# Patient Record
Sex: MALE | Race: NATIVE HAWAIIAN OR OTHER PACIFIC ISLANDER | HISPANIC OR LATINO | Employment: OTHER | URBAN - METROPOLITAN AREA
[De-identification: names, ages, dates, MRNs, and addresses within clinical notes are randomized per-mention and may not be internally consistent; named-entity substitution may affect disease eponyms.]

---

## 2017-01-12 ENCOUNTER — ANTICOAGULATION THERAPY VISIT (OUTPATIENT)
Dept: NURSING | Facility: CLINIC | Age: 82
End: 2017-01-12
Payer: COMMERCIAL

## 2017-01-12 DIAGNOSIS — I48.20 CHRONIC ATRIAL FIBRILLATION (H): ICD-10-CM

## 2017-01-12 DIAGNOSIS — Z79.01 LONG-TERM (CURRENT) USE OF ANTICOAGULANTS: Primary | ICD-10-CM

## 2017-01-12 LAB — INR POINT OF CARE: 1.8 (ref 0.86–1.14)

## 2017-01-12 PROCEDURE — 99207 ZZC NO CHARGE NURSE ONLY: CPT

## 2017-01-12 PROCEDURE — 36416 COLLJ CAPILLARY BLOOD SPEC: CPT

## 2017-01-12 PROCEDURE — 85610 PROTHROMBIN TIME: CPT | Mod: QW

## 2017-01-12 NOTE — MR AVS SNAPSHOT
Pranay Dubon   1/12/2017 8:00 AM   Anticoagulation Therapy Visit    Description:  81 year old male   Provider:  VANDA ANTI COAG   Department:  [facility]           INR as of 1/12/2017     Selected INR 1.8! (1/12/2017)      Anticoagulation Summary as of 1/12/2017     INR goal 2.0-3.0   Selected INR 1.8! (1/12/2017)   Full instructions 5 mg on Thu; 2.5 mg all other days   Next INR check 1/26/2017    Indications   Long-term (current) use of anticoagulants [Z79.01] [Z79.01]  Chronic atrial fibrillation (H) [I48.2]         Your next Anticoagulation Clinic appointment(s)     Jan 26, 2017  8:00 AM   Anticoagulation Visit with VANDA ANTI MOHSEN   [facility] ([facility]    [address] [facility] [address] [phone]              Contact Numbers     [facility]  Please call [phone] to cancel and/or reschedule your appointment   Please call [phone] with any problems or questions regarding your therapy.        January 2017 Details    Sun Mon Tue Wed Thu Fri Sat     1               2               3               4               5               6               7                 8               9               10               11               12      5 mg   See details      13      2.5 mg         14      2.5 mg           15      2.5 mg         16      2.5 mg         17      2.5 mg         18      2.5 mg         19      5 mg         20      2.5 mg         21      2.5 mg           22      2.5 mg         23      2.5 mg         24      2.5 mg         25      2.5 mg         26            27               28                 29               30               31                    Date Details   01/12 This INR check       Date of next INR:  1/26/2017         How to take your warfarin dose     To take:  2.5 mg Take 0.5 of a 5 mg tablet.    To take:  5 mg Take 1 of the 5 mg tablets.

## 2017-01-12 NOTE — PROGRESS NOTES
ANTICOAGULATION FOLLOW-UP CLINIC VISIT    Patient Name:  Pranay Dubon  Date:  1/12/2017  Contact Type:  Face to Face    SUBJECTIVE:     Patient Findings     Positives No Problem Findings, Unexplained INR or factor level change           OBJECTIVE    INR PROTIME   Date Value Ref Range Status   01/12/2017 1.8* 0.86 - 1.14 Final       ASSESSMENT / PLAN  INR assessment SUB    Recheck INR In: 2 WEEKS    INR Location Clinic      Anticoagulation Summary as of 1/12/2017     INR goal 2.0-3.0   Selected INR 1.8! (1/12/2017)   Maintenance plan 5 mg (5 mg x 1) on Thu; 2.5 mg (5 mg x 0.5) all other days   Full instructions 5 mg on Thu; 2.5 mg all other days   Weekly total 20 mg   Plan last modified Stephanie Lucio, RN (1/12/2017)   Next INR check 1/26/2017   Priority INR   Target end date Indefinite    Indications   Long-term (current) use of anticoagulants [Z79.01] [Z79.01]  Chronic atrial fibrillation (H) [I48.2]         Anticoagulation Episode Summary     INR check location     Preferred lab     Send INR reminders to [facility]    Comments       Anticoagulation Care Providers     Provider Role Specialty Phone number    Rose Spencer MD [facility] [phone]            See the Encounter Report to view Anticoagulation Flowsheet and Dosing Calendar (Go to Encounters tab in chart review, and find the Anticoagulation Therapy Visit)    RN reviewed possible causes for out of range INR today with patient.  No changes noted by patient .   Will continue to monitor closely until INR within therapeutic range for patient. Patient educated on signs and symptoms of Bleeding,Stroke or Clots. Patient stated understanding and will seek immediate care if developing signs and symptoms of concern. Dosage adjustment made based on physician directed care plan.          Stephanie Lucio, RN

## 2017-01-20 ENCOUNTER — OFFICE VISIT (OUTPATIENT)
Dept: OPHTHALMOLOGY | Facility: CLINIC | Age: 82
End: 2017-01-20
Payer: COMMERCIAL

## 2017-01-20 DIAGNOSIS — Z01.01 ENCOUNTER FOR EXAMINATION OF EYES AND VISION WITH ABNORMAL FINDINGS: Primary | ICD-10-CM

## 2017-01-20 DIAGNOSIS — H43.813 POSTERIOR VITREOUS DETACHMENT, BILATERAL: ICD-10-CM

## 2017-01-20 DIAGNOSIS — H26.9 CATARACT: ICD-10-CM

## 2017-01-20 DIAGNOSIS — H52.4 PRESBYOPIA: ICD-10-CM

## 2017-01-20 PROBLEM — H25.813 COMBINED FORM OF AGE-RELATED CATARACT, BOTH EYES: Status: ACTIVE | Noted: 2017-01-20

## 2017-01-20 PROCEDURE — 92004 COMPRE OPH EXAM NEW PT 1/>: CPT | Performed by: OPHTHALMOLOGY

## 2017-01-20 PROCEDURE — 92015 DETERMINE REFRACTIVE STATE: CPT | Performed by: OPHTHALMOLOGY

## 2017-01-20 ASSESSMENT — REFRACTION_WEARINGRX
OS_AXIS: 179
OD_SPHERE: -2.50
OS_ADD: +3.00
OD_AXIS: 015
OD_CYLINDER: +2.00
OS_CYLINDER: +0.50
OD_ADD: +3.00
OS_SPHERE: +0.75

## 2017-01-20 ASSESSMENT — VISUAL ACUITY
OS_CC: 20/30
OD_CC: 20/25-
OS_CC: J1+
OD_CC: J1
CORRECTION_TYPE: GLASSES
METHOD: SNELLEN - LINEAR

## 2017-01-20 ASSESSMENT — REFRACTION_MANIFEST
OS_ADD: +3.00
OD_AXIS: 005
OD_ADD: +3.00
OD_SPHERE: -2.50
OS_SPHERE: +0.25
OD_CYLINDER: +1.50
OS_AXIS: 180
OS_CYLINDER: +1.75

## 2017-01-20 ASSESSMENT — EXTERNAL EXAM - LEFT EYE: OS_EXAM: MILD TO MOD BROW, PROLAPSED FAT PADS: UPPER, LOWER

## 2017-01-20 ASSESSMENT — CONF VISUAL FIELD
OD_NORMAL: 1
OS_NORMAL: 1

## 2017-01-20 ASSESSMENT — TONOMETRY
IOP_METHOD: APPLANATION
OS_IOP_MMHG: 11
OD_IOP_MMHG: 10

## 2017-01-20 ASSESSMENT — SLIT LAMP EXAM - LIDS
COMMENTS: NORMAL
COMMENTS: NORMAL

## 2017-01-20 ASSESSMENT — EXTERNAL EXAM - RIGHT EYE: OD_EXAM: MILD TO MOD BROW, PROLAPSED FAT PADS: UPPER, LOWER

## 2017-01-20 ASSESSMENT — CUP TO DISC RATIO
OD_RATIO: 0.6
OS_RATIO: 0.7

## 2017-01-20 NOTE — MR AVS SNAPSHOT
After Visit Summary   1/20/2017    Pranay uDbon    MRN: [id number]           Patient Information     Date Of Birth          1935        Visit Information        Provider Department      1/20/2017 8:00 AM Kaushik Salazar MD [facility]        Today's Diagnoses     Examination of eyes and vision    -  1     Presbyopia         Myopia, right         Hypermetropia, left         Astigmatism, bilateral         Combined form of age-related cataract, both eyes         Posterior vitreous detachment, bilateral           Care Instructions    Glasses Rx given -optional   Call in September 2017 for an appointment in January 2018 for Complete Exam    Dr. Salazar [phone]        Follow-ups after your visit        Your next 10 appointments already scheduled     Jan 25, 2017  8:30 AM   PHYSICAL with Rose Spencer MD   [facility] ([facility]    [address] [id number] [phone]            Jan 26, 2017  8:00 AM   Anticoagulation Visit with FZ ANTI COAG   [facility] ([facility]    [address] [place] [address] [phone]              Who to contact     If you have questions or need follow up information about today's clinic visit or your schedule please contact [facility] directly at [phone].  Normal or non-critical lab and imaging results will be communicated to you by MyChart, letter or phone within 4 business days after the clinic has received the results. If you do not hear from us within 7 days, please contact the clinic through MyChart or phone. If you have a critical or abnormal lab result, we will notify you by phone as soon as possible.  Submit refill requests through [email or web address] or call your pharmacy and they will forward the refill request to us. Please allow 3 business days for your refill to be completed.          Additional Information About Your Visit         Cloudjutsu Information     Cloud[employer] gives you secure access to your electronic health record. If you see a primary care provider, you can also send messages to your care team and make appointments. If you have questions, please call your primary care clinic.  If you do not have a primary care provider, please call [phone] and they will assist you.        Care EveryWhere ID     This is your Care EveryWhere ID. This could be used by other organizations to access your [place] medical records  [id number]         Blood Pressure from Last 3 Encounters:   08/11/16 128/68   05/23/16 134/65   02/08/16 118/72    Weight from Last 3 Encounters:   08/11/16 82.101 kg (181 lb)   06/10/16 82.555 kg (182 lb)   06/01/16 82.555 kg (182 lb)              We Performed the Following     EYE EXAM (SIMPLE-NONBILLABLE)     REFRACTIVE STATUS        Primary Care Provider Office Phone # Fax #    Rose Spencer [phone]       [address] [address]        Thank you!     Thank you for choosing [facility]  for your care. Our goal is always to provide you with excellent care. Hearing back from our patients is one way we can continue to improve our services. Please take a few minutes to complete the written survey that you may receive in the mail after your visit with us. Thank you!             Your Updated Medication List - Protect others around you: Learn how to safely use, store and throw away your medicines at www.disposemymeds.org.          This list is accurate as of: 1/20/17  8:58 AM.  Always use your most recent med list.                   Brand Name Dispense Instructions for use    aspirin 81 MG tablet      Take 81 mg by mouth daily       doxazosin 4 MG tablet    CARDURA     Take 4 mg by mouth At Bedtime       METAMUCIL PO          metoprolol 50 MG tablet    LOPRESSOR    180 tablet    Take 1/2 tablet twice a day       * NITROSTAT SL      Place 0.4  mg under the tongue       * nitroglycerin 0.4 MG sublingual tablet    NITROSTAT    25 tablet    Place 1 tablet (0.4 mg) under the tongue every 5 minutes as needed for chest pain If you are still having symptoms after 3 doses (15 minutes) call 911.       simvastatin 80 MG tablet    ZOCOR    90 tablet    Take 1 TABLET EVERY EVENING       VIAGRA 25 MG cap/tab   Generic drug:  sildenafil      Take 25 mg by mouth as needed. As needed       warfarin 5 MG tablet    COUMADIN     Take 0.5 tablets (2.5 mg) by mouth daily       * Notice:  This list has 2 medication(s) that are the same as other medications prescribed for you. Read the directions carefully, and ask your doctor or other care provider to review them with you.

## 2017-01-20 NOTE — PROGRESS NOTES
Current Eye Medications:  None      Subjective:  COMPLETE EYE EXAM  [facility] and he feels that the vision is not the best especially for driving.     Objective:  See Ophthalmology Exam.       Assessment: Baseline eye exam in patient with cataracts approaching visual significance.      ICD-10-CM    1. Encounter for examination of eyes and vision with abnormal findings Z01.01    2. Presbyopia H52.4 EYE EXAM (SIMPLE-NONBILLABLE)     REFRACTIVE STATUS   3. Cataract, mild-mod, ou H26.9    4. Posterior vitreous detachment, bilateral H43.813         Plan: Glasses Rx given -optional   Discussed cataract surgery right eye if glasses change no better - M ring?  Call in September 2017 for an appointment in January 2018 for Complete Exam    Dr. Salazar [phone]

## 2017-01-20 NOTE — PATIENT INSTRUCTIONS
Glasses Rx given -optional   Discussed cataract surgery right eye if glasses change no better - M ring?  Call in September 2017 for an appointment in January 2018 for Complete Exam    Dr. Salazar [phone]

## 2017-01-21 PROBLEM — H25.813 COMBINED FORM OF AGE-RELATED CATARACT, BOTH EYES: Status: RESOLVED | Noted: 2017-01-20 | Resolved: 2017-01-21

## 2017-01-21 PROBLEM — H26.9 CATARACT: Status: ACTIVE | Noted: 2017-01-21

## 2017-01-24 NOTE — PROGRESS NOTES
SUBJECTIVE:                                                            Pranay Dubon is a 81 year old male who presents for Preventive Visit.      Are you in the first 12 months of your Medicare Part B coverage?  No    Healthy Habits:    Do you get at least three servings of calcium containing foods daily (dairy, green leafy vegetables, etc.)? yes    Amount of exercise or daily activities, outside of work: 5 day(s) per week    Problems taking medications regularly No    Medication side effects: No    Have you had an eye exam in the past two years? yes    Do you see a dentist twice per year? yes    Do you have sleep apnea, excessive snoring or daytime drowsiness?no    COGNITIVE SCREEN  1) Repeat 3 items (Banana, Sunrise, Chair)    2) Clock draw: NORMAL  3) 3 item recall: Recalls 3 objects  Results: 3 items recalled: COGNITIVE IMPAIRMENT LESS LIKELY    Mini-CogTM Copyright S [employer]. Licensed by the author for use in [facility]; reprinted with permission (adia@[email or web address]). All rights reserved.                 All Histories reviewed and updated in EPIC as appropriate.  Social History   Substance Use Topics     Smoking status: Former Smoker -- 1.00 packs/day for 30 years     Types: Cigarettes     Quit date: 08/13/1983     Smokeless tobacco: Never Used     Alcohol Use: No       The patient does not drink >3 drinks per day nor >7 drinks per week.    Today's PHQ-2 Score:   PHQ-2 ( 1999 Pfizer) 1/25/2017 8/11/2016   Q1: Little interest or pleasure in doing things 0 0   Q2: Feeling down, depressed or hopeless 0 0   PHQ-2 Score 0 0       Do you feel safe in your environment - Yes    Do you have a Health Care Directive?: Yes: Advance Directive has been received and scanned.    Current providers sharing in care for this patient include:   Patient Care Team:  Rose Spencer MD as PCP - General (Family Practice)      Hearing impairment: No    Ability to successfully perform activities of daily living: Yes, no  assistance needed     Fall risk:  Fallen 2 or more times in the past year?: No  Any fall with injury in the past year?: No    Home safety:  none identified      The following health maintenance items are reviewed in Epic and correct as of today:  Health Maintenance   Topic Date Due     PNEUMOCOCCAL (2 of 2 - PCV13) 11/25/2003     LIPID MONITORING Q3 MO (NO INBASKET)  11/11/2016     FALL RISK ASSESSMENT  02/08/2017     INFLUENZA VACCINE (SYSTEM ASSIGNED)  09/01/2017     ADVANCE DIRECTIVE PLANNING Q5 YRS (NO INBASKET)  11/28/2017     EYE EXAM Q1 YEAR( NO INBASKET)  01/20/2018     TETANUS IMMUNIZATION (SYSTEM ASSIGNED)  04/16/2023     Immunization History   Administered Date(s) Administered     Hepatitis A Vac Ped/Adol-2 Dose 05/20/2005     Influenza (High Dose) 3 valent vaccine 09/22/2016     Influenza (IIV3) 09/20/2012     Pneumococcal (PCV 13) 10/20/2014     Pneumococcal 23 valent 11/25/2002     TD (ADULT, 7+) 10/09/2003     Tdap (Adacel,Boostrix) 04/16/2013     Zoster vaccine, live 09/08/2008, 01/05/2009             ROS:  This 81 year old male is here today for annual male exam. He and his wife live in a house. 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 Thus, he landed a job in [place] and [facility]. His daughter just finished 7 years of work for the [employer] helping homeless veterans. She is back in [place] now. All other review of systems are negative  Personal, family, and social history reviewed with patient and revised.    C: NEGATIVE for fever, chills, change in weight  I: NEGATIVE for worrisome rashes, moles or lesions  E: NEGATIVE for vision changes or irritation  E/M: NEGATIVE for ear, mouth and throat problems  R: NEGATIVE for significant cough or SOB  B: NEGATIVE for masses, tenderness or discharge  CV: NEGATIVE for chest pain, palpitations  or peripheral edema  GI: NEGATIVE for nausea, abdominal pain, heartburn, or change in bowel habits  : NEGATIVE for frequency, dysuria, or hematuria  M: NEGATIVE for significant arthralgias or myalgia  N: NEGATIVE for weakness, dizziness or paresthesias  E: NEGATIVE for temperature intolerance, skin/hair changes  H: NEGATIVE for bleeding problems  P: NEGATIVE for changes in mood or affect    Problem list, Medication list, Allergies, and Medical/Social/Surgical histories reviewed in [facility] and updated as appropriate.  Labs reviewed in EPIC  BP Readings from Last 3 Encounters:   01/25/17 112/64   08/11/16 128/68   05/23/16 134/65    Wt Readings from Last 3 Encounters:   01/25/17 180 lb (81.647 kg)   08/11/16 181 lb (82.101 kg)   06/10/16 182 lb (82.555 kg)                  Patient Active Problem List   Diagnosis     Advanced directives, counseling/discussion     CAD (coronary artery disease)     Hyperlipidemia with target LDL less than 100     ED (erectile dysfunction)     Obesity     SNHL (sensorineural hearing loss)     Long-term (current) use of anticoagulants [Z79.01]     Chronic atrial fibrillation (H)     Benign essential hypertension     Coronary artery disease involving native heart without angina pectoris, unspecified vessel or lesion type     Posterior vitreous detachment, bilateral     Cataract, mild-mod, ou     Past Surgical History   Procedure Laterality Date     Angioplasty  1992     x 4       Social History   Substance Use Topics     Smoking status: Former Smoker -- 1.00 packs/day for 30 years     Types: Cigarettes     Quit date: 08/13/1983     Smokeless tobacco: Never Used     Alcohol Use: No     Family History   Problem Relation Age of Onset     DIABETES Mother      HEART DISEASE Mother      CHF     CEREBROVASCULAR DISEASE Mother      DIABETES Sister      CEREBROVASCULAR DISEASE Sister      HEART DISEASE Brother      CHF     CEREBROVASCULAR DISEASE Brother      HEART DISEASE Sister      CHF     CANCER  "No family hx of      Hypertension No family hx of      Thyroid Disease No family hx of      Glaucoma No family hx of      Macular Degeneration No family hx of          Current Outpatient Prescriptions   Medication Sig Dispense Refill     Atorvastatin Calcium (LIPITOR PO) Take 40 mg by mouth daily       warfarin (COUMADIN) 5 MG tablet Take 0.5 tablets (2.5 mg) by mouth daily       nitroglycerin (NITROSTAT) 0.4 MG SL tablet Place 1 tablet (0.4 mg) under the tongue every 5 minutes as needed for chest pain If you are still having symptoms after 3 doses (15 minutes) call 911. 25 tablet 0     metoprolol (LOPRESSOR) 50 MG tablet Take 1/2 tablet twice a day 180 tablet 3     aspirin 81 MG tablet Take 81 mg by mouth daily       Nitroglycerin (NITROSTAT SL) Place 0.4 mg under the tongue       Psyllium (METAMUCIL PO)        doxazosin (CARDURA) 4 MG tablet Take 4 mg by mouth At Bedtime       sildenafil (VIAGRA) 25 MG tablet Take 25 mg by mouth as needed. As needed       OBJECTIVE:                                                            /64 mmHg  Pulse 68  Temp(Src) 97.9  F (36.6  C)  Ht 5' 4\" (1.626 m)  Wt 180 lb (81.647 kg)  BMI 30.88 kg/m2  SpO2 100% Estimated body mass index is 30.88 kg/(m^2) as calculated from the following:    Height as of this encounter: 5' 4\" (1.626 m).    Weight as of this encounter: 180 lb (81.647 kg).  EXAM:   GENERAL: healthy, alert and no distress  EYES: Eyes grossly normal to inspection, PERRL and conjunctivae and sclerae normal  HENT: ear canals and TM's normal, nose and mouth without ulcers or lesions  NECK: no adenopathy, no asymmetry, masses, or scars and thyroid normal to palpation  RESP: lungs clear to auscultation - no rales, rhonchi or wheezes  CV: regular rate and rhythm, normal S1 S2, no S3 or S4, no murmur, click or rub, no peripheral edema and peripheral pulses strong  ABDOMEN: soft, nontender, no hepatosplenomegaly, no masses and bowel sounds normal  MS: no gross " "musculoskeletal defects noted, no edema  SKIN: no suspicious lesions or rashes  NEURO: Normal strength and tone, mentation intact and speech normal  PSYCH: mentation appears normal, affect normal/bright    ASSESSMENT / PLAN:                                                            1. Encounter for routine adult health examination without abnormal findings  Healthy man     2. Need for prophylactic vaccination against Streptococcus pneumoniae (pneumococcus)  due    3. Hyperlipidemia with target LDL less than 100  due  - Lipid panel reflex to direct LDL    4. Benign essential hypertension  Good control   - Basic metabolic panel    End of Life Planning:  Patient currently has an advanced directive: No.  I have verified the patient's ablity to prepare an advanced directive/make health care decisions.  Literature was provided to assist patient in preparing an advanced directive.    COUNSELING:  Reviewed preventive health counseling, as reflected in patient instructions       Regular exercise       Healthy diet/nutrition        Estimated body mass index is 30.88 kg/(m^2) as calculated from the following:    Height as of this encounter: 5' 4\" (1.626 m).    Weight as of this encounter: 180 lb (81.647 kg).     reports that he quit smoking about 33 years ago. His smoking use included Cigarettes. He has a 30 pack-year smoking history. He has never used smokeless tobacco.      Appropriate preventive services were discussed with this patient, including applicable screening as appropriate for cardiovascular disease, diabetes, osteopenia/osteoporosis, and glaucoma.  As appropriate for age/gender, discussed screening for colorectal cancer, prostate cancer, breast cancer, and cervical cancer. Checklist reviewing preventive services available has been given to the patient.    Reviewed patients plan of care and provided an AVS. The Basic Care Plan (routine screening as documented in Health Maintenance) for Pranay meets the Care " Plan requirement. This Care Plan has been established and reviewed with the Patient.    Counseling Resources:  ATP IV Guidelines  Pooled Cohorts Equation Calculator  Breast Cancer Risk Calculator  FRAX Risk Assessment  ICSI Preventive Guidelines  Dietary Guidelines for Americans, 2010  USDA's MyPlate  ASA Prophylaxis  Lung CA Screening    MICHELLE YOUNGBLOOD MD  [facility]

## 2017-01-24 NOTE — PATIENT INSTRUCTIONS
Preventive Health Recommendations:   Male Ages 65 and over    Yearly exam:             See your health care provider every year in order to  o   Review health changes.   o   Discuss preventive care.    o   Review your medicines if your doctor has prescribed any.    Talk with your health care provider about whether you should have a test to screen for prostate cancer (PSA).    Every 3 years, have a diabetes test (fasting glucose). If you are at risk for diabetes, you should have this test more often.    Every 5 years, have a cholesterol test. Have this test more often if you are at risk for high cholesterol or heart disease.     Every 10 years, have a colonoscopy. Or, have a yearly FIT test (stool test). These exams will check for colon cancer.    Talk to with your health care provider about screening for Abdominal Aortic Aneurysm if you have a family history of AAA or have a history of smoking.    Shots:     Get a flu shot each year.     Get a tetanus shot every 10 years.     Talk to your doctor about your pneumonia vaccines. There are now two you should receive - Pneumovax (PPSV 23) and Prevnar (PCV 13).     Talk to your doctor about a shingles vaccine.     Talk to your doctor about the hepatitis B vaccine.  Nutrition:     Eat at least 5 servings of fruits and vegetables each day.     Eat whole-grain bread, whole-wheat pasta and brown rice instead of white grains and rice.     Talk to your provider about Calcium and Vitamin D.   Lifestyle    Exercise for at least 150 minutes a week (30 minutes a day, 5 days a week). This will help you control your weight and prevent disease.     Limit alcohol to one drink per day.     No smoking.     Wear sunscreen to prevent skin cancer.     See your dentist every six months for an exam and cleaning.     See your eye doctor every 1 to 2 years to screen for conditions such as glaucoma, macular degeneration, cataracts, etc   [facility]    If you have any questions  regarding to your visit please contact your care team:       Team Purple:   Clinic Hours Telephone Number   [address] Dr.   7am-7pm  Monday - Thursday   7am-5pm  Fridays  [phone]  (Appointment scheduling available 24/7)    Questions about your Visit?   Team Line:  [phone]   Urgent Care - [place] [facility] - 11am-9pm Monday-Friday Saturday-Sunday- 9am-5pm   [place] - 5pm-9pm Monday-Friday Saturday-Sunday- 9am-5pm  [phone] - [place]   [phone] - [place]       What options do I have for visits at the clinic other than the traditional office visit?  To expand how we care for you, many of our providers are utilizing electronic visits (e-visits) and telephone visits, when medically appropriate, for interactions with their patients rather than a visit in the clinic.   We also offer nurse visits for many medical concerns. Just like any other service, we will bill your insurance company for this type of visit based on time spent on the phone with your provider. Not all insurance companies cover these visits. Please check with your medical insurance if this type of visit is covered. You will be responsible for any charges that are not paid by your insurance.      E-visits via [employer]:  generally incur a $35.00 fee.  Telephone visits:  Time spent on the phone: *charged based on time that is spent on the phone in increments of 10 minutes. Estimated cost:   5-10 mins $30.00   11-20 mins. $59.00   21-30 mins. $85.00     Use [employer] (secure email communication and access to your chart) to send your primary care provider a message or make an appointment. Ask someone on your Team how to sign up for [employer].  For a Price Quote for your services, please call our Consumer Price Line at [phone].  As always, Thank you for trusting us with your health care needs!

## 2017-01-25 ENCOUNTER — OFFICE VISIT (OUTPATIENT)
Dept: FAMILY MEDICINE | Facility: CLINIC | Age: 82
End: 2017-01-25
Payer: COMMERCIAL

## 2017-01-25 VITALS
DIASTOLIC BLOOD PRESSURE: 64 MMHG | HEIGHT: 64 IN | HEART RATE: 68 BPM | WEIGHT: 180 LBS | TEMPERATURE: 97.9 F | BODY MASS INDEX: 30.73 KG/M2 | OXYGEN SATURATION: 100 % | SYSTOLIC BLOOD PRESSURE: 112 MMHG

## 2017-01-25 DIAGNOSIS — I10 BENIGN ESSENTIAL HYPERTENSION: ICD-10-CM

## 2017-01-25 DIAGNOSIS — Z00.00 ENCOUNTER FOR ROUTINE ADULT HEALTH EXAMINATION WITHOUT ABNORMAL FINDINGS: Primary | ICD-10-CM

## 2017-01-25 DIAGNOSIS — Z23 NEED FOR PROPHYLACTIC VACCINATION AGAINST STREPTOCOCCUS PNEUMONIAE (PNEUMOCOCCUS): ICD-10-CM

## 2017-01-25 DIAGNOSIS — E78.5 HYPERLIPIDEMIA WITH TARGET LDL LESS THAN 100: ICD-10-CM

## 2017-01-25 LAB
ANION GAP SERPL CALCULATED.3IONS-SCNC: 7 MMOL/L (ref 3–14)
BUN SERPL-MCNC: 19 MG/DL (ref 7–30)
CALCIUM SERPL-MCNC: 8.9 MG/DL (ref 8.5–10.1)
CHLORIDE SERPL-SCNC: 106 MMOL/L (ref 94–109)
CHOLEST SERPL-MCNC: 145 MG/DL
CO2 SERPL-SCNC: 26 MMOL/L (ref 20–32)
CREAT SERPL-MCNC: 1.15 MG/DL (ref 0.66–1.25)
GFR SERPL CREATININE-BSD FRML MDRD: 61 ML/MIN/1.7M2
GLUCOSE SERPL-MCNC: 111 MG/DL (ref 70–99)
HDLC SERPL-MCNC: 61 MG/DL
LDLC SERPL CALC-MCNC: 72 MG/DL
NONHDLC SERPL-MCNC: 84 MG/DL
POTASSIUM SERPL-SCNC: 4.2 MMOL/L (ref 3.4–5.3)
SODIUM SERPL-SCNC: 139 MMOL/L (ref 133–144)
TRIGL SERPL-MCNC: 59 MG/DL

## 2017-01-25 PROCEDURE — 99397 PER PM REEVAL EST PAT 65+ YR: CPT | Performed by: FAMILY MEDICINE

## 2017-01-25 PROCEDURE — 80048 BASIC METABOLIC PNL TOTAL CA: CPT | Performed by: FAMILY MEDICINE

## 2017-01-25 PROCEDURE — 36415 COLL VENOUS BLD VENIPUNCTURE: CPT | Performed by: FAMILY MEDICINE

## 2017-01-25 PROCEDURE — 80061 LIPID PANEL: CPT | Performed by: FAMILY MEDICINE

## 2017-01-25 NOTE — MR AVS SNAPSHOT
After Visit Summary   1/25/2017    Pranay Dubon    MRN: [id number]           Patient Information     Date Of Birth          1935        Visit Information        Provider Department      1/25/2017 8:30 AM Rose Spencer MD [facility]        Today's Diagnoses     Encounter for routine adult health examination without abnormal findings    -  1     Need for prophylactic vaccination against Streptococcus pneumoniae (pneumococcus)         Hyperlipidemia with target LDL less than 100         Benign essential hypertension           Care Instructions      Preventive Health Recommendations:   Male Ages 65 and over    Yearly exam:             See your health care provider every year in order to  o   Review health changes.   o   Discuss preventive care.    o   Review your medicines if your doctor has prescribed any.    Talk with your health care provider about whether you should have a test to screen for prostate cancer (PSA).    Every 3 years, have a diabetes test (fasting glucose). If you are at risk for diabetes, you should have this test more often.    Every 5 years, have a cholesterol test. Have this test more often if you are at risk for high cholesterol or heart disease.     Every 10 years, have a colonoscopy. Or, have a yearly FIT test (stool test). These exams will check for colon cancer.    Talk to with your health care provider about screening for Abdominal Aortic Aneurysm if you have a family history of AAA or have a history of smoking.    Shots:     Get a flu shot each year.     Get a tetanus shot every 10 years.     Talk to your doctor about your pneumonia vaccines. There are now two you should receive - Pneumovax (PPSV 23) and Prevnar (PCV 13).     Talk to your doctor about a shingles vaccine.     Talk to your doctor about the hepatitis B vaccine.  Nutrition:     Eat at least 5 servings of fruits and vegetables each day.     Eat whole-grain bread, whole-wheat pasta and  brown rice instead of white grains and rice.     Talk to your provider about Calcium and Vitamin D.   Lifestyle    Exercise for at least 150 minutes a week (30 minutes a day, 5 days a week). This will help you control your weight and prevent disease.     Limit alcohol to one drink per day.     No smoking.     Wear sunscreen to prevent skin cancer.     See your dentist every six months for an exam and cleaning.     See your eye doctor every 1 to 2 years to screen for conditions such as glaucoma, macular degeneration, cataracts, etc   [facility]    If you have any questions regarding to your visit please contact your care team:       Team Purple:   Clinic Hours Telephone Number   [address] Dr.   7am-7pm  Monday - Thursday   7am-5pm  Fridays  [phone]  (Appointment scheduling available 24/7)    Questions about your Visit?   Team Line:  [phone]   Urgent Care - [facility] and [facility] - 11am-9pm Monday-Friday Saturday-Sunday- 9am-5pm   [facility] - 5pm-9pm Monday-Friday Saturday-Sunday- 9am-5pm  [phone] - [place]   [phone] - [facility]       What options do I have for visits at the clinic other than the traditional office visit?  To expand how we care for you, many of our providers are utilizing electronic visits (e-visits) and telephone visits, when medically appropriate, for interactions with their patients rather than a visit in the clinic.   We also offer nurse visits for many medical concerns. Just like any other service, we will bill your insurance company for this type of visit based on time spent on the phone with your provider. Not all insurance companies cover these visits. Please check with your medical insurance if this type of visit is covered. You will be responsible for any charges that are not paid by your insurance.      E-visits via [employer]:  generally incur a $35.00 fee.  Telephone  visits:  Time spent on the phone: *charged based on time that is spent on the phone in increments of 10 minutes. Estimated cost:   5-10 mins $30.00   11-20 mins. $59.00   21-30 mins. $85.00     Use [employer] (secure email communication and access to your chart) to send your primary care provider a message or make an appointment. Ask someone on your Team how to sign up for [employer].  For a Price Quote for your services, please call our [facility] Price Line at [phone].  As always, Thank you for trusting us with your health care needs!            Follow-ups after your visit        Your next 10 appointments already scheduled     Jan 26, 2017  8:00 AM   Anticoagulation Visit with FZ ANTI COAG   [facility] [place] ([facility])    [address]  [place] [address] [phone]              Who to contact     If you have questions or need follow up information about today's clinic visit or your schedule please contact [facility] directly at [phone].  Normal or non-critical lab and imaging results will be communicated to you by Kindfulhart, letter or phone within 4 business days after the clinic has received the results. If you do not hear from us within 7 days, please contact the clinic through Kind[employer] or phone. If you have a critical or abnormal lab result, we will notify you by phone as soon as possible.  Submit refill requests through [employer] or call your pharmacy and they will forward the refill request to us. Please allow 3 business days for your refill to be completed.          Additional Information About Your Visit        [employer]iggle Information     [employer] gives you secure access to your electronic health record. If you see a primary care provider, you can also send messages to your care team and make appointments. If you have questions, please call your primary care clinic.  If you do not have a primary care provider, please call [phone] and they will assist you.    "     Care EveryWhere ID     This is your Care EveryWhere ID. This could be used by other organizations to access your [facility] medical records  [id number]        Your Vitals Were     Pulse Temperature Height BMI (Body Mass Index) Pulse Oximetry       68 97.9  F (36.6  C) 5' 4\" (1.626 m) 30.88 kg/m2 100%        Blood Pressure from Last 3 Encounters:   01/25/17 112/64   08/11/16 128/68   05/23/16 134/65    Weight from Last 3 Encounters:   01/25/17 180 lb (81.647 kg)   08/11/16 181 lb (82.101 kg)   06/10/16 182 lb (82.555 kg)              We Performed the Following     Basic metabolic panel     Lipid panel reflex to direct LDL        Primary Care Provider Office Phone # Fax #    Rose Spencer [phone]       [address] [address]        Thank you!     Thank you for choosing [facility]  for your care. Our goal is always to provide you with excellent care. Hearing back from our patients is one way we can continue to improve our services. Please take a few minutes to complete the written survey that you may receive in the mail after your visit with us. Thank you!             Your Updated Medication List - Protect others around you: Learn how to safely use, store and throw away your medicines at www.disposemymeds.org.          This list is accurate as of: 1/25/17  8:58 AM.  Always use your most recent med list.                   Brand Name Dispense Instructions for use    aspirin 81 MG tablet      Take 81 mg by mouth daily       doxazosin 4 MG tablet    CARDURA     Take 4 mg by mouth At Bedtime       LIPITOR PO      Take 40 mg by mouth daily       METAMUCIL PO          metoprolol 50 MG tablet    LOPRESSOR    180 tablet    Take 1/2 tablet twice a day       * NITROSTAT SL      Place 0.4 mg under the tongue       * nitroglycerin 0.4 MG sublingual tablet    NITROSTAT    25 tablet    Place 1 tablet (0.4 mg) under the tongue every 5 " minutes as needed for chest pain If you are still having symptoms after 3 doses (15 minutes) call 911.       VIAGRA 25 MG cap/tab   Generic drug:  sildenafil      Take 25 mg by mouth as needed. As needed       warfarin 5 MG tablet    COUMADIN     Take 0.5 tablets (2.5 mg) by mouth daily       * Notice:  This list has 2 medication(s) that are the same as other medications prescribed for you. Read the directions carefully, and ask your doctor or other care provider to review them with you.

## 2017-01-25 NOTE — Clinical Note
[facility]  [address]  [place] [address]    January 25, 2017    Pranay Dubon  [address] [facility] [address]          Dear Pranay,    Enclosed is a copy of your results. Your labs are all within normal limits    Results for orders placed or performed in visit on 01/25/17   Lipid panel reflex to direct LDL   Result Value Ref Range    Cholesterol 145 <200 mg/dL    Triglycerides 59 <150 mg/dL    HDL Cholesterol 61 >39 mg/dL    LDL Cholesterol Calculated 72 <100 mg/dL    Non HDL Cholesterol 84 <130 mg/dL   Basic metabolic panel   Result Value Ref Range    Sodium 139 133 - 144 mmol/L    Potassium 4.2 3.4 - 5.3 mmol/L    Chloride 106 94 - 109 mmol/L    Carbon Dioxide 26 20 - 32 mmol/L    Anion Gap 7 3 - 14 mmol/L    Glucose 111 (H) 70 - 99 mg/dL    Urea Nitrogen 19 7 - 30 mg/dL    Creatinine 1.15 0.66 - 1.25 mg/dL    GFR Estimate 61 >60 mL/min/1.7m2    GFR Estimate If Black 74 >60 mL/min/1.7m2    Calcium 8.9 8.5 - 10.1 mg/dL       If you have any questions or concerns, please call myself or my nurse at [phone].      Sincerely,        Rose Spencer MD, dr

## 2017-01-25 NOTE — NURSING NOTE
"Chief Complaint   Patient presents with     Physical       Initial /64 mmHg  Pulse 68  Temp(Src) 97.9  F (36.6  C)  Ht 5' 4\" (1.626 m)  Wt 180 lb (81.647 kg)  BMI 30.88 kg/m2  SpO2 100% Estimated body mass index is 30.88 kg/(m^2) as calculated from the following:    Height as of this encounter: 5' 4\" (1.626 m).    Weight as of this encounter: 180 lb (81.647 kg).  BP completed using cuff size: rossy Dominguez MA      "

## 2017-01-26 DIAGNOSIS — Z79.01 LONG-TERM (CURRENT) USE OF ANTICOAGULANTS: Primary | ICD-10-CM

## 2017-01-26 DIAGNOSIS — Z79.01 LONG-TERM (CURRENT) USE OF ANTICOAGULANTS: ICD-10-CM

## 2017-01-26 LAB — INR PPP: 2.1 (ref 0.86–1.14)

## 2017-01-26 PROCEDURE — 85610 PROTHROMBIN TIME: CPT | Performed by: FAMILY MEDICINE

## 2017-01-26 PROCEDURE — 36416 COLLJ CAPILLARY BLOOD SPEC: CPT | Performed by: FAMILY MEDICINE

## 2017-01-27 ENCOUNTER — TELEPHONE (OUTPATIENT)
Dept: OTOLARYNGOLOGY | Facility: CLINIC | Age: 82
End: 2017-01-27

## 2017-01-27 NOTE — TELEPHONE ENCOUNTER
Pranay Dubon is a 81 year old male who calls with ongoing nose bleeds.  Patient states that he had a nose bleed that lasted an hour. He as since got it to stop.     NURSING ASSESSMENT:  Description:  See above   Onset/duration:  On and off all week  Precip. factors:  History of bleeds   Associated symptoms:  NA  Improves/worsens symptoms:  NA  Pain scale (0-10)   0/10  Last exam/Treatment:  6/10/2017  Allergies: No Known Allergies    MEDICATIONS:   Taking medication(s) as prescribed? Yes  Taking over the counter medication(s?) No  Any medication side effects? No significant side effects    Any barriers to taking medication(s) as prescribed?  No  Medication(s) improving/managing symptoms?  No  Medication reconciliation completed: Yes      NURSING PLAN: Routed to provider Yes    RECOMMENDED DISPOSITION:  To ED, another person to drive - if you are unable to stop a nose bleed  Will comply with recommendation: No- Barriers to comply with plan of care States that noone knows how to stop a nose bleed in the ED.  If further questions/concerns or if symptoms do not improve, worsen or new symptoms develop, call your PCP or [facility] as soon as possible.      Guideline used:  Telephone Triage Protocols for Nurses, Fourth Edition, Naty Cooley RN

## 2017-02-01 ENCOUNTER — OFFICE VISIT (OUTPATIENT)
Dept: OTOLARYNGOLOGY | Facility: CLINIC | Age: 82
End: 2017-02-01
Payer: COMMERCIAL

## 2017-02-01 VITALS — SYSTOLIC BLOOD PRESSURE: 100 MMHG | HEART RATE: 70 BPM | DIASTOLIC BLOOD PRESSURE: 63 MMHG

## 2017-02-01 DIAGNOSIS — R04.0 EPISTAXIS: Primary | ICD-10-CM

## 2017-02-01 PROCEDURE — 99213 OFFICE O/P EST LOW 20 MIN: CPT | Mod: 25 | Performed by: OTOLARYNGOLOGY

## 2017-02-01 PROCEDURE — 31238 NSL/SINS NDSC SRG NSL HEMRRG: CPT | Performed by: OTOLARYNGOLOGY

## 2017-02-01 ASSESSMENT — PAIN SCALES - GENERAL: PAINLEVEL: NO PAIN (0)

## 2017-02-01 NOTE — PROGRESS NOTES
Mr. Pranay Dubon is an 81-year-old gentleman with a history of coronary artery disease, atrial fibrillation who is on Coumadin.  He has a history of epistaxis which is almost exclusively on the left side.  He notes this has been going on for several years on an intermittent basis.  He claims he has had his nose cauterized approximately 30 times in the past.  This was most recently per records in 06/2016.  He has no other history of bleeding disorders.  He notes that he recently increased his Coumadin to get his INR from 1.8 up to 2.1.  He has noted over the last few days intermittent bleeding.  He did have a bleed yesterday, none since then and again all bleeding has been exclusively left sided.       PAST MEDICAL HISTORY:   Past Medical History   Diagnosis Date     Microscopic hematuria      normal urologic evaluation      CAD (coronary artery disease)      4 stents     Atrial fibrillation (H)      Hypercholesterolemia      Hypertension goal BP (blood pressure) < 140/90      GERD (gastroesophageal reflux disease)      ED (erectile dysfunction)      Obesity      SNHL (sensorineural hearing loss)      Lumbar stenosis      BPH (benign prostatic hyperplasia)      Nonsenile cataract      Type 2 diabetes mellitus without complications (H)        PAST SURGICAL HISTORY:   Past Surgical History   Procedure Laterality Date     Angioplasty  1992     x 4       FAMILY HISTORY:   Family History   Problem Relation Age of Onset     DIABETES Mother      HEART DISEASE Mother      CHF     CEREBROVASCULAR DISEASE Mother      DIABETES Sister      CEREBROVASCULAR DISEASE Sister      HEART DISEASE Brother      CHF     CEREBROVASCULAR DISEASE Brother      HEART DISEASE Sister      CHF     CANCER No family hx of      Hypertension No family hx of      Thyroid Disease No family hx of      Glaucoma No family hx of      Macular Degeneration No family hx of        SOCIAL HISTORY:   Social History   Substance Use Topics     Smoking status: Former  Smoker -- 1.00 packs/day for 30 years     Types: Cigarettes     Quit date: 08/13/1983     Smokeless tobacco: Never Used     Alcohol Use: No       REVIEW OF SYSTEMS: Ten point review of systems was performed and is negative except for what is noted in the HPI and PMH.     ALLERGIES: Review of patient's allergies indicates no known allergies.    MEDICATIONS:   Current Outpatient Prescriptions   Medication Sig Dispense Refill     Atorvastatin Calcium (LIPITOR PO) Take 40 mg by mouth daily       warfarin (COUMADIN) 5 MG tablet Take 0.5 tablets (2.5 mg) by mouth daily       metoprolol (LOPRESSOR) 50 MG tablet Take 1/2 tablet twice a day 180 tablet 3     aspirin 81 MG tablet Take 81 mg by mouth daily       Psyllium (METAMUCIL PO)        doxazosin (CARDURA) 4 MG tablet Take 4 mg by mouth At Bedtime       nitroglycerin (NITROSTAT) 0.4 MG SL tablet Place 1 tablet (0.4 mg) under the tongue every 5 minutes as needed for chest pain If you are still having symptoms after 3 doses (15 minutes) call 911. 25 tablet 0     Nitroglycerin (NITROSTAT SL) Place 0.4 mg under the tongue       sildenafil (VIAGRA) 25 MG tablet Take 25 mg by mouth as needed. As needed              PHYSICAL EXAMINATION:   GENERAL:  He is awake, alert, in no apparent distress.   HEENT:  His tympanic membranes are clear and mobile bilaterally.  Nasal exam shows a left septal deviation.  There is no active bleeding at this time, no prominent vasculature.  He does have a spur which is low on the left side.  Examination of the oral cavity shows no suspicious lesions.  There is symmetric movement of the tongue and soft palate.   NECK:  Supple, without significant adenopathy.   PULSES:  Regular.   UPPER AIRWAY:  Clear.   CRANIAL NERVES:  II through XII are grossly intact.      NASAL ENDOSCOPY was performed following topical anesthesia with 3% lidocaine and 0.25% phenylephrine.  Consent was obtained, timeout was performed.  Scope was passed bilaterally.  There is a  good airway in the right nasal cavity without obstruction and no mass lesions were seen.  On the left side, a septal deviation with a spur down low and anterior at the posterior aspect of the nasal vestibule.  No active bleeding was seen.  However, because of the symptoms a moist Q-tip was applied to the area to clean off secretions.  A small bleed was seen at the tip of the septal spur on the left side; 0.25% phenylephrine and 3% lidocaine was applied to the area to control this and silver nitrate stick was used twice to control the bleeding.  I reinforced the directions for him to use.  I recommended a water soluble gel to apply to the area at least 3 times a day and p.r.n. as well as a saline nasal spray, emphasizing that if he can keep his anterior nasal mucosa moist the incidence of bleeding should be reduced.  This was given in a hand written note form to help him with the pharmacy.  All questions were answered.  I will see him back on a p.r.n. basis.         JACY CROUCH MD             D: 2017 08:49   T: 2017 09:35   MT: DE      Name:     PAMELA CELESTIN   MRN:      [id number]        Account:      [id number]   :      1935           Visit Date:   2017      Document: [id number]       cc: Rose Spencer MD

## 2017-02-01 NOTE — NURSING NOTE
"Pranay Dubon's goals for this visit include:   Chief Complaint   Patient presents with     Consult     Nose bleeds - x one week       He requests these members of his care team be copied on today's visit information: Rose Spencer      PCP: Rose Spencer    Referring Provider:  No referring provider defined for this encounter.    Chief Complaint   Patient presents with     Consult     Nose bleeds - x one week       Initial There were no vitals taken for this visit. Estimated body mass index is 30.88 kg/(m^2) as calculated from the following:    Height as of 1/25/17: 1.626 m (5' 4\").    Weight as of 1/25/17: 81.647 kg (180 lb).  BP completed using cuff size: large    "

## 2017-03-14 ENCOUNTER — ANTICOAGULATION THERAPY VISIT (OUTPATIENT)
Dept: NURSING | Facility: CLINIC | Age: 82
End: 2017-03-14
Payer: COMMERCIAL

## 2017-03-14 ENCOUNTER — TELEPHONE (OUTPATIENT)
Dept: NURSING | Facility: CLINIC | Age: 82
End: 2017-03-14

## 2017-03-14 DIAGNOSIS — Z79.01 LONG-TERM (CURRENT) USE OF ANTICOAGULANTS: ICD-10-CM

## 2017-03-14 DIAGNOSIS — I48.20 CHRONIC ATRIAL FIBRILLATION (H): ICD-10-CM

## 2017-03-14 DIAGNOSIS — Z79.01 LONG-TERM (CURRENT) USE OF ANTICOAGULANTS: Primary | ICD-10-CM

## 2017-03-14 LAB — INR POINT OF CARE: 2.3 (ref 0.86–1.14)

## 2017-03-14 PROCEDURE — 99207 ZZC NO CHARGE NURSE ONLY: CPT

## 2017-03-14 PROCEDURE — 85610 PROTHROMBIN TIME: CPT | Mod: QW

## 2017-03-14 PROCEDURE — 36416 COLLJ CAPILLARY BLOOD SPEC: CPT

## 2017-03-14 NOTE — MR AVS SNAPSHOT
Pranay KELVIN Jagdish   3/14/2017 8:15 AM   Anticoagulation Therapy Visit    Description:  81 year old male   Provider:  VANDA ANTI COAG   Department:  [facility]           INR as of 3/14/2017     Today's INR 2.3      Anticoagulation Summary as of 3/14/2017     INR goal 2.0-3.0   Today's INR 2.3   Full instructions 2.5 mg every day   Next INR check 4/27/2017    Indications   Long-term (current) use of anticoagulants [Z79.01] [Z79.01]  Chronic atrial fibrillation (H) [I48.2]         Your next Anticoagulation Clinic appointment(s)     Apr 27, 2017  8:00 AM CDT   Anticoagulation Visit with VANDA ANTI COAG   [facility] ([facility]    [address] [address] [phone]              Contact Numbers     [facility]  Please call [phone] to cancel and/or reschedule your appointment   Please call [phone] with any problems or questions regarding your therapy.        March 2017 Details    Sun Mon Tue Wed Thu Fri Sat        1               2               3               4                 5               6               7               8               9               10               11                 12               13               14      2.5 mg   See details      15      2.5 mg         16      2.5 mg         17      2.5 mg         18      2.5 mg           19      2.5 mg         20      2.5 mg         21      2.5 mg         22      2.5 mg         23      2.5 mg         24      2.5 mg         25      2.5 mg           26      2.5 mg         27      2.5 mg         28      2.5 mg         29      2.5 mg         30      2.5 mg         31      2.5 mg           Date Details   03/14 This INR check               How to take your warfarin dose     To take:  2.5 mg Take 0.5 of a 5 mg tablet.           April 2017 Details    Sun Mon Tue Wed Thu Fri Sat           1      2.5 mg           2      2.5 mg         3      2.5 mg         4      2.5 mg         5      2.5 mg         6      2.5  mg         7      2.5 mg         8      2.5 mg           9      2.5 mg         10      2.5 mg         11      2.5 mg         12      2.5 mg         13      2.5 mg         14      2.5 mg         15      2.5 mg           16      2.5 mg         17      2.5 mg         18      2.5 mg         19      2.5 mg         20      2.5 mg         21      2.5 mg         22      2.5 mg           23      2.5 mg         24      2.5 mg         25      2.5 mg         26      2.5 mg         27            28               29                 30                      Date Details   No additional details    Date of next INR:  4/27/2017         How to take your warfarin dose     To take:  2.5 mg Take 0.5 of a 5 mg tablet.

## 2017-03-14 NOTE — PROGRESS NOTES
ANTICOAGULATION FOLLOW-UP CLINIC VISIT    Patient Name:  Pranay Dubon  Date:  3/14/2017  Contact Type:  Face to Face    SUBJECTIVE:     Patient Findings     Comments Patient states he has been taking 2.5 mg every day per his cardiologist for the last month.            OBJECTIVE    INR Protime   Date Value Ref Range Status   03/14/2017 2.3 (A) 0.86 - 1.14 Final       ASSESSMENT / PLAN  INR assessment THER    Recheck INR In: 6 WEEKS    INR Location Clinic      Anticoagulation Summary as of 3/14/2017     INR goal 2.0-3.0   Today's INR 2.3   Maintenance plan 2.5 mg (5 mg x 0.5) every day   Full instructions 2.5 mg every day   Weekly total 17.5 mg   Plan last modified Nicole Chatman RN (3/14/2017)   Next INR check 4/27/2017   Priority INR   Target end date Indefinite    Indications   Long-term (current) use of anticoagulants [Z79.01] [Z79.01]  Chronic atrial fibrillation (H) [I48.2]         Anticoagulation Episode Summary     INR check location     Preferred lab     Send INR reminders to [facility]    Comments       Anticoagulation Care Providers     Provider Role Specialty Phone number    Rose Spencer MD [facility] [phone]            See the Encounter Report to view Anticoagulation Flowsheet and Dosing Calendar (Go to Encounters tab in chart review, and find the Anticoagulation Therapy Visit)    Dosage adjustment made based on physician directed care plan.    Nicole Chatman RN

## 2017-04-11 ENCOUNTER — MYC MEDICAL ADVICE (OUTPATIENT)
Dept: FAMILY MEDICINE | Facility: CLINIC | Age: 82
End: 2017-04-11

## 2017-04-11 DIAGNOSIS — N52.9 ERECTILE DYSFUNCTION, UNSPECIFIED ERECTILE DYSFUNCTION TYPE: Primary | ICD-10-CM

## 2017-04-11 RX ORDER — SILDENAFIL CITRATE 20 MG/1
TABLET ORAL
Qty: 10 TABLET | Refills: 0 | Status: SHIPPED | OUTPATIENT
Start: 2017-04-11 | End: 2017-10-16

## 2017-04-11 NOTE — TELEPHONE ENCOUNTER
Srinivasa Spencer,    I want to try a generic for Viagra. As an initial trial, please order SILDENAFIL, ten 20mg tablets, at [facility]. Will try to determine if it is as effective as 25mg of Viagra.    Armen Dubon    Order teed up.  Please advise.  Fozia June RN

## 2017-04-27 ENCOUNTER — ANTICOAGULATION THERAPY VISIT (OUTPATIENT)
Dept: NURSING | Facility: CLINIC | Age: 82
End: 2017-04-27
Payer: COMMERCIAL

## 2017-04-27 DIAGNOSIS — I48.20 CHRONIC ATRIAL FIBRILLATION (H): ICD-10-CM

## 2017-04-27 DIAGNOSIS — Z79.01 LONG-TERM (CURRENT) USE OF ANTICOAGULANTS: ICD-10-CM

## 2017-04-27 LAB — INR POINT OF CARE: 2.8 (ref 0.86–1.14)

## 2017-04-27 PROCEDURE — 99207 ZZC NO CHARGE NURSE ONLY: CPT

## 2017-04-27 PROCEDURE — 85610 PROTHROMBIN TIME: CPT | Mod: QW

## 2017-04-27 PROCEDURE — 36416 COLLJ CAPILLARY BLOOD SPEC: CPT

## 2017-04-27 NOTE — PROGRESS NOTES
ANTICOAGULATION FOLLOW-UP CLINIC VISIT    Patient Name:  Pranay Dubon  Date:  4/27/2017  Contact Type:  Face to Face    SUBJECTIVE:     Patient Findings     Positives No Problem Findings           OBJECTIVE    INR Protime   Date Value Ref Range Status   04/27/2017 2.8 (A) 0.86 - 1.14 Final       ASSESSMENT / PLAN  No question data found.  Anticoagulation Summary as of 4/27/2017     INR goal 2.0-3.0   Today's INR 2.8   Maintenance plan 2.5 mg (5 mg x 0.5) every day   Full instructions 2.5 mg every day   Weekly total 17.5 mg   No change documented Nicole Chatman, RN   Plan last modified Nicole Chatman RN (3/14/2017)   Next INR check 6/8/2017   Priority INR   Target end date Indefinite    Indications   Long-term (current) use of anticoagulants [Z79.01] [Z79.01]  Chronic atrial fibrillation (H) [I48.2]         Anticoagulation Episode Summary     INR check location     Preferred lab     Send INR reminders to [facility]    Comments       Anticoagulation Care Providers     Provider Role Specialty Phone number    Rose Spencer MD [facility] [phone]            See the Encounter Report to view Anticoagulation Flowsheet and Dosing Calendar (Go to Encounters tab in chart review, and find the Anticoagulation Therapy Visit)    RN reviewed patient's weekly warfarin dose. Pt INR remains within therapeutic range. Pt will continue with current dosing and monitoring as planned, based on physician directed care plan.      Nicole Chatman RN

## 2017-04-27 NOTE — MR AVS SNAPSHOT
Pranay Dubon   4/27/2017 8:00 AM   Anticoagulation Therapy Visit    Description:  81 year old male   Provider:  VANDA ANTI COAG   Department:  [facility]           INR as of 4/27/2017     Today's INR 2.8      Anticoagulation Summary as of 4/27/2017     INR goal 2.0-3.0   Today's INR 2.8   Full instructions 2.5 mg every day   Next INR check 6/8/2017    Indications   Long-term (current) use of anticoagulants [Z79.01] [Z79.01]  Chronic atrial fibrillation (H) [I48.2]         Your next Anticoagulation Clinic appointment(s)     Jun 08, 2017  8:00 AM CDT   Anticoagulation Visit with VANDA ANTI COAG   [facility] ([facility]    [address] [address] [phone]              Contact Numbers     [facility]  Please call [phone] to cancel and/or reschedule your appointment   Please call [phone] with any problems or questions regarding your therapy.        April 2017 Details    Sun Mon Tue Wed Thu Fri Sat           1                 2               3               4               5               6               7               8                 9               10               11               12               13               14               15                 16               17               18               19               20               21               22                 23               24               25               26               27      2.5 mg   See details      28      2.5 mg         29      2.5 mg           30      2.5 mg                Date Details   04/27 This INR check               How to take your warfarin dose     To take:  2.5 mg Take 0.5 of a 5 mg tablet.           May 2017 Details    Sun Mon Tue Wed Thu Fri Sat      1      2.5 mg         2      2.5 mg         3      2.5 mg         4      2.5 mg         5      2.5 mg         6      2.5 mg           7      2.5 mg         8      2.5 mg         9      2.5 mg         10      2.5 mg          11      2.5 mg         12      2.5 mg         13      2.5 mg           14      2.5 mg         15      2.5 mg         16      2.5 mg         17      2.5 mg         18      2.5 mg         19      2.5 mg         20      2.5 mg           21      2.5 mg         22      2.5 mg         23      2.5 mg         24      2.5 mg         25      2.5 mg         26      2.5 mg         27      2.5 mg           28      2.5 mg         29      2.5 mg         30      2.5 mg         31      2.5 mg             Date Details   No additional details            How to take your warfarin dose     To take:  2.5 mg Take 0.5 of a 5 mg tablet.           June 2017 Details    Sun Mon Tue Wed Thu Fri Sat         1      2.5 mg         2      2.5 mg         3      2.5 mg           4      2.5 mg         5      2.5 mg         6      2.5 mg         7      2.5 mg         8            9               10                 11               12               13               14               15               16               17                 18               19               20               21               22               23               24                 25               26               27               28               29               30                 Date Details   No additional details    Date of next INR:  6/8/2017         How to take your warfarin dose     To take:  2.5 mg Take 0.5 of a 5 mg tablet.

## 2017-05-22 ENCOUNTER — MYC MEDICAL ADVICE (OUTPATIENT)
Dept: FAMILY MEDICINE | Facility: CLINIC | Age: 82
End: 2017-05-22

## 2017-06-07 ENCOUNTER — TRANSFERRED RECORDS (OUTPATIENT)
Dept: HEALTH INFORMATION MANAGEMENT | Facility: CLINIC | Age: 82
End: 2017-06-07

## 2017-06-08 ENCOUNTER — ANTICOAGULATION THERAPY VISIT (OUTPATIENT)
Dept: NURSING | Facility: CLINIC | Age: 82
End: 2017-06-08
Payer: COMMERCIAL

## 2017-06-08 DIAGNOSIS — I48.20 CHRONIC ATRIAL FIBRILLATION (H): ICD-10-CM

## 2017-06-08 DIAGNOSIS — Z79.01 LONG-TERM (CURRENT) USE OF ANTICOAGULANTS: ICD-10-CM

## 2017-06-08 LAB — INR POINT OF CARE: 2.8 (ref 0.86–1.14)

## 2017-06-08 PROCEDURE — 99207 ZZC NO CHARGE NURSE ONLY: CPT

## 2017-06-08 PROCEDURE — 36416 COLLJ CAPILLARY BLOOD SPEC: CPT

## 2017-06-08 PROCEDURE — 85610 PROTHROMBIN TIME: CPT | Mod: QW

## 2017-06-08 NOTE — MR AVS SNAPSHOT
Pranay Dubon   6/8/2017 8:00 AM   Anticoagulation Therapy Visit    Description:  81 year old male   Provider:  VANDA ANTI COAG   Department:  [facility]           INR as of 6/8/2017     Today's INR 2.8      Anticoagulation Summary as of 6/8/2017     INR goal 2.0-3.0   Today's INR 2.8   Full instructions 2.5 mg every day   Next INR check 7/20/2017    Indications   Long-term (current) use of anticoagulants [Z79.01] [Z79.01]  Chronic atrial fibrillation (H) [I48.2]         Your next Anticoagulation Clinic appointment(s)     Jul 20, 2017  8:00 AM CDT   Anticoagulation Visit with VANDA ANTI COAG   [facility] ([facility]    [address] [address] [phone]              Contact Numbers     [facility]  Please call [phone] to cancel and/or reschedule your appointment   Please call [phone] with any problems or questions regarding your therapy.        June 2017 Details    Sun Mon Tue Wed Thu Fri Sat         1               2               3                 4               5               6               7               8      2.5 mg   See details      9      2.5 mg         10      2.5 mg           11      2.5 mg         12      2.5 mg         13      2.5 mg         14      2.5 mg         15      2.5 mg         16      2.5 mg         17      2.5 mg           18      2.5 mg         19      2.5 mg         20      2.5 mg         21      2.5 mg         22      2.5 mg         23      2.5 mg         24      2.5 mg           25      2.5 mg         26      2.5 mg         27      2.5 mg         28      2.5 mg         29      2.5 mg         30      2.5 mg           Date Details   06/08 This INR check               How to take your warfarin dose     To take:  2.5 mg Take 0.5 of a 5 mg tablet.           July 2017 Details    Sun Mon Tue Wed Thu Fri Sat           1      2.5 mg           2      2.5 mg         3      2.5 mg         4      2.5 mg         5      2.5 mg         6       2.5 mg         7      2.5 mg         8      2.5 mg           9      2.5 mg         10      2.5 mg         11      2.5 mg         12      2.5 mg         13      2.5 mg         14      2.5 mg         15      2.5 mg           16      2.5 mg         17      2.5 mg         18      2.5 mg         19      2.5 mg         20            21               22                 23               24               25               26               27               28               29                 30               31                     Date Details   No additional details    Date of next INR:  7/20/2017         How to take your warfarin dose     To take:  2.5 mg Take 0.5 of a 5 mg tablet.

## 2017-06-08 NOTE — PROGRESS NOTES
ANTICOAGULATION FOLLOW-UP CLINIC VISIT    Patient Name:  Pranay Dubon  Date:  6/8/2017  Contact Type:  Face to Face    SUBJECTIVE:     Patient Findings     Positives No Problem Findings           OBJECTIVE    INR Protime   Date Value Ref Range Status   06/08/2017 2.8 (A) 0.86 - 1.14 Final       ASSESSMENT / PLAN  No question data found.  Anticoagulation Summary as of 6/8/2017     INR goal 2.0-3.0   Today's INR 2.8   Maintenance plan 2.5 mg (5 mg x 0.5) every day   Full instructions 2.5 mg every day   Weekly total 17.5 mg   No change documented Nicole Chatman, RN   Plan last modified Nicole Chatman RN (3/14/2017)   Next INR check 7/20/2017   Priority INR   Target end date Indefinite    Indications   Long-term (current) use of anticoagulants [Z79.01] [Z79.01]  Chronic atrial fibrillation (H) [I48.2]         Anticoagulation Episode Summary     INR check location     Preferred lab     Send INR reminders to [facility]    Comments       Anticoagulation Care Providers     Provider Role Specialty Phone number    Rose Spencer MD [facility] [phone]            See the Encounter Report to view Anticoagulation Flowsheet and Dosing Calendar (Go to Encounters tab in chart review, and find the Anticoagulation Therapy Visit)    Dosage adjustment made based on physician directed care plan.    Nicole Chatman RN

## 2017-07-20 ENCOUNTER — ANTICOAGULATION THERAPY VISIT (OUTPATIENT)
Dept: NURSING | Facility: CLINIC | Age: 82
End: 2017-07-20
Payer: COMMERCIAL

## 2017-07-20 DIAGNOSIS — I48.20 CHRONIC ATRIAL FIBRILLATION (H): ICD-10-CM

## 2017-07-20 DIAGNOSIS — Z79.01 LONG-TERM (CURRENT) USE OF ANTICOAGULANTS: ICD-10-CM

## 2017-07-20 LAB — INR POINT OF CARE: 2.1 (ref 0.86–1.14)

## 2017-07-20 PROCEDURE — 36416 COLLJ CAPILLARY BLOOD SPEC: CPT

## 2017-07-20 PROCEDURE — 99207 ZZC NO CHARGE NURSE ONLY: CPT

## 2017-07-20 PROCEDURE — 85610 PROTHROMBIN TIME: CPT | Mod: QW

## 2017-07-20 NOTE — MR AVS SNAPSHOT
Pranay Dubon   7/20/2017 8:00 AM   Anticoagulation Therapy Visit    Description:  81 year old male   Provider:  VANDA ANTI COAG   Department:  [facility]           INR as of 7/20/2017     Today's INR       Anticoagulation Summary as of 7/20/2017     INR goal 2.0-3.0   Today's INR    Full instructions 2.5 mg every day   Next INR check 8/31/2017    Indications   Long-term (current) use of anticoagulants [Z79.01] [Z79.01]  Chronic atrial fibrillation (H) [I48.2]         Your next Anticoagulation Clinic appointment(s)     Jul 20, 2017  8:00 AM CDT   Anticoagulation Visit with VANDA ANTI COAG   [facility] ([facility]    [address] [id number] [phone]            Aug 31, 2017  8:00 AM CDT   Anticoagulation Visit with VANDA ANTI COAG   [facility] ([place] [id number] [id number] [phone]              Contact Numbers     [facility]  Please call [phone] to cancel and/or reschedule your appointment   Please call [phone] with any problems or questions regarding your therapy.        July 2017 Details    Sun Mon Tue Wed Thu Fri Sat           1                 2               3               4               5               6               7               8                 9               10               11               12               13               14               15                 16               17               18               19               20      2.5 mg   See details      21      2.5 mg         22      2.5 mg           23      2.5 mg         24      2.5 mg         25      2.5 mg         26      2.5 mg         27      2.5 mg         28      2.5 mg         29      2.5 mg           30      2.5 mg         31      2.5 mg               Date Details   07/20 This INR check               How to take your warfarin dose     To take:  2.5 mg Take 0.5 of a 5 mg tablet.           August 2017 Details     Sun Mon Tue Wed Thu Fri Sat       1      2.5 mg         2      2.5 mg         3      2.5 mg         4      2.5 mg         5      2.5 mg           6      2.5 mg         7      2.5 mg         8      2.5 mg         9      2.5 mg         10      2.5 mg         11      2.5 mg         12      2.5 mg           13      2.5 mg         14      2.5 mg         15      2.5 mg         16      2.5 mg         17      2.5 mg         18      2.5 mg         19      2.5 mg           20      2.5 mg         21      2.5 mg         22      2.5 mg         23      2.5 mg         24      2.5 mg         25      2.5 mg         26      2.5 mg           27      2.5 mg         28      2.5 mg         29      2.5 mg         30      2.5 mg         31               Date Details   No additional details    Date of next INR:  8/31/2017         How to take your warfarin dose     To take:  2.5 mg Take 0.5 of a 5 mg tablet.

## 2017-07-20 NOTE — PROGRESS NOTES
ANTICOAGULATION FOLLOW-UP CLINIC VISIT    Patient Name:  Pranay Dubon  Date:  7/20/2017  Contact Type:  Face to Face    SUBJECTIVE:     Patient Findings     Positives No Problem Findings    Comments Patient states ran out of Jantoven 2.5 mg so took old prescription of warfarin 3 mg daily for last week.           OBJECTIVE    INR Protime   Date Value Ref Range Status   07/20/2017 2.1 (A) 0.86 - 1.14 Final       ASSESSMENT / PLAN  No question data found.  Anticoagulation Summary as of 7/20/2017     INR goal 2.0-3.0   Today's INR 2.1   Maintenance plan 2.5 mg (5 mg x 0.5) every day   Full instructions 2.5 mg every day   Weekly total 17.5 mg   No change documented Nicole Chatman RN   Plan last modified Nicole Chatman RN (3/14/2017)   Next INR check 8/31/2017   Priority INR   Target end date Indefinite    Indications   Long-term (current) use of anticoagulants [Z79.01] [Z79.01]  Chronic atrial fibrillation (H) [I48.2]         Anticoagulation Episode Summary     INR check location     Preferred lab     Send INR reminders to [facility]    Comments       Anticoagulation Care Providers     Provider Role Specialty Phone number    Rose Spencer MD [facility] [phone]            See the Encounter Report to view Anticoagulation Flowsheet and Dosing Calendar (Go to Encounters tab in chart review, and find the Anticoagulation Therapy Visit)    Dosage adjustment made based on physician directed care plan.    Nicole Chatman RN

## 2017-08-31 ENCOUNTER — ANTICOAGULATION THERAPY VISIT (OUTPATIENT)
Dept: NURSING | Facility: CLINIC | Age: 82
End: 2017-08-31
Payer: COMMERCIAL

## 2017-08-31 DIAGNOSIS — I48.20 CHRONIC ATRIAL FIBRILLATION (H): ICD-10-CM

## 2017-08-31 DIAGNOSIS — Z79.01 LONG-TERM (CURRENT) USE OF ANTICOAGULANTS: ICD-10-CM

## 2017-08-31 LAB — INR POINT OF CARE: 2 (ref 0.86–1.14)

## 2017-08-31 PROCEDURE — 85610 PROTHROMBIN TIME: CPT | Mod: QW

## 2017-08-31 PROCEDURE — 99207 ZZC NO CHARGE NURSE ONLY: CPT

## 2017-08-31 PROCEDURE — 36416 COLLJ CAPILLARY BLOOD SPEC: CPT

## 2017-08-31 NOTE — PROGRESS NOTES
ANTICOAGULATION FOLLOW-UP CLINIC VISIT    Patient Name:  Pranay Dubon  Date:  8/31/2017  Contact Type:  Face to Face    SUBJECTIVE:     Patient Findings     Positives No Problem Findings           OBJECTIVE    INR Protime   Date Value Ref Range Status   08/31/2017 2.0 (A) 0.86 - 1.14 Final       ASSESSMENT / PLAN  No question data found.  Anticoagulation Summary as of 8/31/2017     INR goal 2.0-3.0   Today's INR 2.0   Maintenance plan 2.5 mg (5 mg x 0.5) every day   Full instructions 2.5 mg every day   Weekly total 17.5 mg   No change documented Nicole Chatman, RN   Plan last modified Nicole Chatman RN (3/14/2017)   Next INR check 10/12/2017   Priority INR   Target end date Indefinite    Indications   Long-term (current) use of anticoagulants [Z79.01] [Z79.01]  Chronic atrial fibrillation (H) [I48.2]         Anticoagulation Episode Summary     INR check location     Preferred lab     Send INR reminders to [facility]    Comments       Anticoagulation Care Providers     Provider Role Specialty Phone number    Rose Spencer MD [facility] [phone]            See the Encounter Report to view Anticoagulation Flowsheet and Dosing Calendar (Go to Encounters tab in chart review, and find the Anticoagulation Therapy Visit)    Dosage adjustment made based on physician directed care plan.    Nicole Chatman RN

## 2017-08-31 NOTE — MR AVS SNAPSHOT
Pranay Dubon   8/31/2017 8:00 AM   Anticoagulation Therapy Visit    Description:  81 year old male   Provider:  VANDA ANTI COAG   Department:  [facility]           INR as of 8/31/2017     Today's INR 2.0      Anticoagulation Summary as of 8/31/2017     INR goal 2.0-3.0   Today's INR 2.0   Full instructions 2.5 mg every day   Next INR check 10/12/2017    Indications   Long-term (current) use of anticoagulants [Z79.01] [Z79.01]  Chronic atrial fibrillation (H) [I48.2]         Your next Anticoagulation Clinic appointment(s)     Oct 12, 2017  8:00 AM CDT   Anticoagulation Visit with VANDA ANTI COAESTER   [facility] ([facility]    [address] [address] [phone]              Contact Numbers     [facility]  Please call [phone] to cancel and/or reschedule your appointment   Please call [phone] with any problems or questions regarding your therapy.        August 2017 Details    Sun Mon Tue Wed Thu Fri Sat       1               2               3               4               5                 6               7               8               9               10               11               12                 13               14               15               16               17               18               19                 20               21               22               23               24               25               26                 27               28               29               30               31      2.5 mg   See details         Date Details   08/31 This INR check               How to take your warfarin dose     To take:  2.5 mg Take 0.5 of a 5 mg tablet.           September 2017 Details    Sun Mon Tue Wed Thu Fri Sat          1      2.5 mg         2      2.5 mg           3      2.5 mg         4      2.5 mg         5      2.5 mg         6      2.5 mg         7      2.5 mg         8      2.5 mg         9      2.5 mg           10      2.5 mg          11      2.5 mg         12      2.5 mg         13      2.5 mg         14      2.5 mg         15      2.5 mg         16      2.5 mg           17      2.5 mg         18      2.5 mg         19      2.5 mg         20      2.5 mg         21      2.5 mg         22      2.5 mg         23      2.5 mg           24      2.5 mg         25      2.5 mg         26      2.5 mg         27      2.5 mg         28      2.5 mg         29      2.5 mg         30      2.5 mg          Date Details   No additional details            How to take your warfarin dose     To take:  2.5 mg Take 0.5 of a 5 mg tablet.           October 2017 Details    Sun Mon Tue Wed Thu Fri Sat     1      2.5 mg         2      2.5 mg         3      2.5 mg         4      2.5 mg         5      2.5 mg         6      2.5 mg         7      2.5 mg           8      2.5 mg         9      2.5 mg         10      2.5 mg         11      2.5 mg         12            13               14                 15               16               17               18               19               20               21                 22               23               24               25               26               27               28                 29               30               31                    Date Details   No additional details    Date of next INR:  10/12/2017         How to take your warfarin dose     To take:  2.5 mg Take 0.5 of a 5 mg tablet.

## 2017-09-13 ENCOUNTER — TRANSFERRED RECORDS (OUTPATIENT)
Dept: HEALTH INFORMATION MANAGEMENT | Facility: CLINIC | Age: 82
End: 2017-09-13

## 2017-10-12 ENCOUNTER — ANTICOAGULATION THERAPY VISIT (OUTPATIENT)
Dept: NURSING | Facility: CLINIC | Age: 82
End: 2017-10-12
Payer: COMMERCIAL

## 2017-10-12 DIAGNOSIS — Z79.01 LONG-TERM (CURRENT) USE OF ANTICOAGULANTS: ICD-10-CM

## 2017-10-12 DIAGNOSIS — I48.20 CHRONIC ATRIAL FIBRILLATION (H): ICD-10-CM

## 2017-10-12 LAB — INR POINT OF CARE: 1.6 (ref 0.86–1.14)

## 2017-10-12 PROCEDURE — 36416 COLLJ CAPILLARY BLOOD SPEC: CPT

## 2017-10-12 PROCEDURE — 99207 ZZC NO CHARGE NURSE ONLY: CPT

## 2017-10-12 PROCEDURE — 85610 PROTHROMBIN TIME: CPT | Mod: QW

## 2017-10-12 NOTE — PROGRESS NOTES
ANTICOAGULATION FOLLOW-UP CLINIC VISIT    Patient Name:  Pranay Dubon  Date:  10/12/2017  Contact Type:  Face to Face    SUBJECTIVE:     Patient Findings     Positives OTC meds (taking mucinex sinus and ibuprofen for cold), Missed doses (Pt held coumadin Tues and Wed on his own because he was taking mucinex and ibuprofen)           OBJECTIVE    INR Protime   Date Value Ref Range Status   10/12/2017 1.6 (A) 0.86 - 1.14 Final       ASSESSMENT / PLAN  INR assessment SUB    Recheck INR In: 6 WEEKS    INR Location Clinic      Anticoagulation Summary as of 10/12/2017     INR goal 2.0-3.0   Today's INR 1.6!   Maintenance plan 2.5 mg (5 mg x 0.5) every day   Full instructions 10/12: 5 mg; Otherwise 2.5 mg every day   Weekly total 17.5 mg   Plan last modified Nicole Chatman RN (3/14/2017)   Next INR check 11/22/2017   Priority INR   Target end date Indefinite    Indications   Long-term (current) use of anticoagulants [Z79.01] [Z79.01]  Chronic atrial fibrillation (H) [I48.2]         Anticoagulation Episode Summary     INR check location     Preferred lab     Send INR reminders to  [facility]    Comments       Anticoagulation Care Providers     Provider Role Specialty Phone number    Rose Spencer MD [facility] [phone]            See the Encounter Report to view Anticoagulation Flowsheet and Dosing Calendar (Go to Encounters tab in chart review, and find the Anticoagulation Therapy Visit)    Dosage adjustment made based on physician directed care plan.    Lopez Fontenot RN

## 2017-10-12 NOTE — MR AVS SNAPSHOT
Pranay KELVIN Jagdish   10/12/2017 8:00 AM   Anticoagulation Therapy Visit    Description:  81 year old male   Provider:  AVNDA ANTI COAG   Department:  [facility]           INR as of 10/12/2017     Today's INR 1.6!      Anticoagulation Summary as of 10/12/2017     INR goal 2.0-3.0   Today's INR 1.6!   Full instructions 10/12: 5 mg; Otherwise 2.5 mg every day   Next INR check 11/22/2017    Indications   Long-term (current) use of anticoagulants [Z79.01] [Z79.01]  Chronic atrial fibrillation (H) [I48.2]         Your next Anticoagulation Clinic appointment(s)     Nov 22, 2017  8:00 AM CST   Anticoagulation Visit with VANDA ANTI COAESTER   [facility] ([address] [address] [phone]              Contact Numbers     [facility]  Please call [phone] to cancel and/or reschedule your appointment   Please call [phone] with any problems or questions regarding your therapy.        October 2017 Details    Sun Mon Tue Wed Thu Fri Sat     1               2               3               4               5               6               7                 8               9               10               11               12      5 mg   See details      13      2.5 mg         14      2.5 mg           15      2.5 mg         16      2.5 mg         17      2.5 mg         18      2.5 mg         19      2.5 mg         20      2.5 mg         21      2.5 mg           22      2.5 mg         23      2.5 mg         24      2.5 mg         25      2.5 mg         26      2.5 mg         27      2.5 mg         28      2.5 mg           29      2.5 mg         30      2.5 mg         31      2.5 mg              Date Details   10/12 This INR check               How to take your warfarin dose     To take:  2.5 mg Take 0.5 of a 5 mg tablet.    To take:  5 mg Take 1 of the 5 mg tablets.           November 2017 Details    Sun Mon Tue Wed Thu Fri Sat        1      2.5 mg         2      2.5 mg          3      2.5 mg         4      2.5 mg           5      2.5 mg         6      2.5 mg         7      2.5 mg         8      2.5 mg         9      2.5 mg         10      2.5 mg         11      2.5 mg           12      2.5 mg         13      2.5 mg         14      2.5 mg         15      2.5 mg         16      2.5 mg         17      2.5 mg         18      2.5 mg           19      2.5 mg         20      2.5 mg         21      2.5 mg         22            23               24               25                 26               27               28               29               30                  Date Details   No additional details    Date of next INR:  11/22/2017         How to take your warfarin dose     To take:  2.5 mg Take 0.5 of a 5 mg tablet.

## 2017-10-15 ENCOUNTER — MYC MEDICAL ADVICE (OUTPATIENT)
Dept: FAMILY MEDICINE | Facility: CLINIC | Age: 82
End: 2017-10-15

## 2017-10-16 ENCOUNTER — OFFICE VISIT (OUTPATIENT)
Dept: FAMILY MEDICINE | Facility: CLINIC | Age: 82
End: 2017-10-16
Payer: COMMERCIAL

## 2017-10-16 VITALS
BODY MASS INDEX: 29.71 KG/M2 | TEMPERATURE: 97.1 F | HEIGHT: 64 IN | OXYGEN SATURATION: 99 % | HEART RATE: 76 BPM | SYSTOLIC BLOOD PRESSURE: 122 MMHG | WEIGHT: 174 LBS | DIASTOLIC BLOOD PRESSURE: 70 MMHG

## 2017-10-16 DIAGNOSIS — J06.9 VIRAL UPPER RESPIRATORY TRACT INFECTION: Primary | ICD-10-CM

## 2017-10-16 PROCEDURE — 99213 OFFICE O/P EST LOW 20 MIN: CPT | Performed by: FAMILY MEDICINE

## 2017-10-16 NOTE — MR AVS SNAPSHOT
After Visit Summary   10/16/2017    Pranay Dubon    MRN: [id number]           Patient Information     Date Of Birth          1935        Visit Information        Provider Department      10/16/2017 3:30 PM Rose Spencer MD [facility]        Today's Diagnoses     Viral upper respiratory tract infection    -  1      Care Instructions    [facility]    If you have any questions regarding to your visit please contact your care team:       Team Purple:   Clinic Hours Telephone Number   Dr. Rose Chávez   7am-7pm  Monday - Thursday   7am-5pm  Fridays  [phone]  (Appointment scheduling available 24/7)    Questions about your Visit?   Team Line:  [phone]   Urgent Care - [place] and [facility] - 11am-9pm Monday-Friday Saturday-Sunday- 9am-5pm   [place] - 5pm-9pm Monday-Friday Saturday-Sunday- 9am-5pm  [phone] - [facility]  [phone] - [place]       What options do I have for visits at the clinic other than the traditional office visit?  To expand how we care for you, many of our providers are utilizing electronic visits (e-visits) and telephone visits, when medically appropriate, for interactions with their patients rather than a visit in the clinic.   We also offer nurse visits for many medical concerns. Just like any other service, we will bill your insurance company for this type of visit based on time spent on the phone with your provider. Not all insurance companies cover these visits. Please check with your medical insurance if this type of visit is covered. You will be responsible for any charges that are not paid by your insurance.      E-visits via [employer]:  generally incur a $35.00 fee.  Telephone visits:  Time spent on the phone: *charged based on time that is spent on the phone in increments of 10 minutes. Estimated cost:   5-10 mins $30.00   11-20 mins. $59.00    21-30 mins. $85.00     Use [employer] (secure email communication and access to your chart) to send your primary care provider a message or make an appointment. Ask someone on your Team how to sign up for [employer].  For a Price Quote for your services, please call our Consumer Price Line at [phone].  As always, Thank you for trusting us with your health care needs!              Follow-ups after your visit        Your next 10 appointments already scheduled     Nov 22, 2017  8:00 AM CST   Anticoagulation Visit with SNEHA ANTI COAG   [facility] ([facility]    [address] [place] [id number] [phone]            Jan 25, 2018  8:30 AM CST   New Visit with Kaushik Salazar MD   [facility] ([address] [id number] [phone]              Who to contact     If you have questions or need follow up information about today's clinic visit or your schedule please contact [facility] directly at [phone].  Normal or non-critical lab and imaging results will be communicated to you by [facility], letter or phone within 4 business days after the clinic has received the results. If you do not hear from us within 7 days, please contact the clinic through [facility] or phone. If you have a critical or abnormal lab result, we will notify you by phone as soon as possible.  Submit refill requests through [employer] or call your pharmacy and they will forward the refill request to us. Please allow 3 business days for your refill to be completed.          Additional Information About Your Visit        [facility] Information     [employer] gives you secure access to your electronic health record. If you see a primary care provider, you can also send messages to your care team and make appointments. If you have questions, please call your primary care clinic.  If you do not have a primary care provider, please call [phone]  "and they will assist you.        Care EveryWhere ID     This is your Care EveryWhere ID. This could be used by other organizations to access your [place] medical records  [id number]        Your Vitals Were     Pulse Temperature Height Pulse Oximetry BMI (Body Mass Index)       76 97.1  F (36.2  C) 5' 4\" (1.626 m) 99% 29.87 kg/m2        Blood Pressure from Last 3 Encounters:   10/16/17 122/70   02/01/17 100/63   01/25/17 112/64    Weight from Last 3 Encounters:   10/16/17 174 lb (78.9 kg)   01/25/17 180 lb (81.6 kg)   08/11/16 181 lb (82.1 kg)              Today, you had the following     No orders found for display       Primary Care Provider Office Phone # Fax #    Rose Spencer [phone]       [address] [address]        Equal Access to Services     GABE IGLESIAS : Hadii aad ku hadasho Soomaali, waaxda luqadaha, qaybta kaalmada adeegyada, waxay carlitain hayswetha [place] . So [facility] [phone].    ATENCIÓN: Si habla español, tiene a bowens disposición servicios gratuitos de asistencia lingüística. Llame al [phone].    We comply with applicable federal civil rights laws and [place] laws. We do not discriminate on the basis of race, color, national origin, age, disability, sex, sexual orientation, or gender identity.            Thank you!     Thank you for choosing [facility]  for your care. Our goal is always to provide you with excellent care. Hearing back from our patients is one way we can continue to improve our services. Please take a few minutes to complete the written survey that you may receive in the mail after your visit with us. Thank you!             Your Updated Medication List - Protect others around you: Learn how to safely use, store and throw away your medicines at www.disposemymeds.org.          This list is accurate as of: 10/16/17  4:11 PM.  Always use your most recent med list.                "    Brand Name Dispense Instructions for use Diagnosis    aspirin 81 MG tablet      Take 81 mg by mouth daily        doxazosin 4 MG tablet    CARDURA     Take 4 mg by mouth At Bedtime        LIPITOR PO      Take 40 mg by mouth daily        METAMUCIL PO           metoprolol 50 MG tablet    LOPRESSOR    180 tablet    Take 1/2 tablet twice a day    Benign essential hypertension       NITROSTAT SL      Place 0.4 mg under the tongue        VIAGRA 25 MG tablet   Generic drug:  sildenafil      Take 25 mg by mouth as needed. As needed        warfarin 5 MG tablet    COUMADIN     Take 0.5 tablets (2.5 mg) by mouth daily    Long-term (current) use of anticoagulants

## 2017-10-16 NOTE — PROGRESS NOTES
SUBJECTIVE:   Pranay Dubon is a 81 year old male who presents to clinic today for the following health issues:      ENT Symptoms             Symptoms: cc Present Absent Comment   Fever/Chills   x    Fatigue   x    Muscle Aches  x     Eye Irritation  x     Sneezing  x     Nasal Frankie/Drg  x     Sinus Pressure/Pain  x     Loss of smell  x     Dental pain   x    Sore Throat   x    Swollen Glands   x    Ear Pain/Fullness   x    Cough  x     Wheeze   x    Chest Pain   x    Shortness of breath   x    Rash       Other         Symptom duration:  8 days   Symptom severity:  severe   Treatments tried:  mucinex,ibuprofen,robutussin max strength   Contacts:  wife                Problem list and histories reviewed & adjusted, as indicated.  Additional history: as documented    Patient Active Problem List   Diagnosis     Advanced directives, counseling/discussion     CAD (coronary artery disease)     Hyperlipidemia with target LDL less than 100     ED (erectile dysfunction)     Obesity     SNHL (sensorineural hearing loss)     Long-term (current) use of anticoagulants [Z79.01]     Chronic atrial fibrillation (H)     Benign essential hypertension     Coronary artery disease involving native heart without angina pectoris, unspecified vessel or lesion type     Posterior vitreous detachment, bilateral     Cataract, mild-mod, ou     Erectile dysfunction, unspecified erectile dysfunction type     Past Surgical History:   Procedure Laterality Date     ANGIOPLASTY  1992    x 4       Social History   Substance Use Topics     Smoking status: Former Smoker     Packs/day: 1.00     Years: 30.00     Types: Cigarettes     Quit date: 8/13/1983     Smokeless tobacco: Never Used     Alcohol use No     Family History   Problem Relation Age of Onset     DIABETES Mother      HEART DISEASE Mother      CHF     CEREBROVASCULAR DISEASE Mother      DIABETES Sister      CEREBROVASCULAR DISEASE Sister      HEART DISEASE Brother      CHF      CEREBROVASCULAR DISEASE Brother      HEART DISEASE Sister      CHF     CANCER No family hx of      Hypertension No family hx of      Thyroid Disease No family hx of      Glaucoma No family hx of      Macular Degeneration No family hx of          Current Outpatient Prescriptions   Medication Sig Dispense Refill     Atorvastatin Calcium (LIPITOR PO) Take 40 mg by mouth daily       warfarin (COUMADIN) 5 MG tablet Take 0.5 tablets (2.5 mg) by mouth daily       metoprolol (LOPRESSOR) 50 MG tablet Take 1/2 tablet twice a day 180 tablet 3     aspirin 81 MG tablet Take 81 mg by mouth daily       Nitroglycerin (NITROSTAT SL) Place 0.4 mg under the tongue       Psyllium (METAMUCIL PO)        doxazosin (CARDURA) 4 MG tablet Take 4 mg by mouth At Bedtime       sildenafil (VIAGRA) 25 MG tablet Take 25 mg by mouth as needed. As needed       [DISCONTINUED] nitroglycerin (NITROSTAT) 0.4 MG SL tablet Place 1 tablet (0.4 mg) under the tongue every 5 minutes as needed for chest pain If you are still having symptoms after 3 doses (15 minutes) call 911. (Patient not taking: Reported on 10/16/2017) 25 tablet 0     No Known Allergies  BP Readings from Last 3 Encounters:   10/16/17 122/70   02/01/17 100/63   01/25/17 112/64    Wt Readings from Last 3 Encounters:   10/16/17 174 lb (78.9 kg)   01/25/17 180 lb (81.6 kg)   08/11/16 181 lb (82.1 kg)                  Labs reviewed in EPIC        Reviewed and updated as needed this visit by clinical staffTobacco  Allergies  Meds  Med Hx  Surg Hx  Fam Hx  Soc Hx      Reviewed and updated as needed this visit by Provider         ROS: This 81 year old male is here today because he has had upper respiratory illness symptoms for the past 8 days. Seems a little better today. Still has burning itchy eyes, a cough, and nasal congestion in spite of taking mucinex combination product and Robitussin DM. His wife is not getting the same symptoms. Is able to eat and sleep. All other review of systems  "are negative  Personal, family, and social history reviewed with patient and revised.         OBJECTIVE:     /70 (BP Location: Left arm, Patient Position: Chair, Cuff Size: Adult Regular)  Pulse 76  Temp 97.1  F (36.2  C)  Ht 5' 4\" (1.626 m)  Wt 174 lb (78.9 kg)  SpO2 99%  BMI 29.87 kg/m2  Body mass index is 29.87 kg/(m^2).  GENERAL: healthy, alert and no distress  EYES: Eyes grossly normal to inspection, PERRL and conjunctivae and sclerae normal  HENT: ear canals and TM's normal, nose and mouth without ulcers or lesions  Sinuses are not tender   NECK: no adenopathy, no asymmetry, masses, or scars and thyroid normal to palpation  RESP: lungs clear to auscultation - no rales, rhonchi or wheezes  CV: regular rate and rhythm, normal S1 S2, no S3 or S4, no murmur, click or rub, no peripheral edema and peripheral pulses strong  MS: no gross musculoskeletal defects noted, no edema    Diagnostic Test Results:  none     ASSESSMENT/PLAN:              1. Viral upper respiratory tract infection  Reassured. Recommend he purchase items over the counter specific for his symptoms. Ie: flonase for his nasal congestion, Pataday for his eyes, Claritin for his drippy nose, etc.       Return to clinic if no improvement     MICHELLE YOUNGBLOOD MD  [facility]    "

## 2017-10-16 NOTE — NURSING NOTE
"Chief Complaint   Patient presents with     URI       Initial /70 (BP Location: Left arm, Patient Position: Chair, Cuff Size: Adult Regular)  Pulse 76  Temp 97.1  F (36.2  C)  Ht 5' 4\" (1.626 m)  Wt 174 lb (78.9 kg)  SpO2 99%  BMI 29.87 kg/m2 Estimated body mass index is 29.87 kg/(m^2) as calculated from the following:    Height as of this encounter: 5' 4\" (1.626 m).    Weight as of this encounter: 174 lb (78.9 kg).  Medication Reconciliation: complete   Linda Lazo MA      "

## 2017-10-16 NOTE — PATIENT INSTRUCTIONS
[facility]    If you have any questions regarding to your visit please contact your care team:       Team Purple:   Clinic Hours Telephone Number   Dr. Rose Chávez   7am-7pm  Monday - Thursday   7am-5pm  Fridays  [phone]  (Appointment scheduling available 24/7)    Questions about your Visit?   Team Line:  [phone]   Urgent Care - [place] and [facility] - 11am-9pm Monday-Friday Saturday-Sunday- 9am-5pm   [place] - 5pm-9pm Monday-Friday Saturday-Sunday- 9am-5pm  [phone] - [facility]  [phone] - [place]       What options do I have for visits at the clinic other than the traditional office visit?  To expand how we care for you, many of our providers are utilizing electronic visits (e-visits) and telephone visits, when medically appropriate, for interactions with their patients rather than a visit in the clinic.   We also offer nurse visits for many medical concerns. Just like any other service, we will bill your insurance company for this type of visit based on time spent on the phone with your provider. Not all insurance companies cover these visits. Please check with your medical insurance if this type of visit is covered. You will be responsible for any charges that are not paid by your insurance.      E-visits via [employer]:  generally incur a $35.00 fee.  Telephone visits:  Time spent on the phone: *charged based on time that is spent on the phone in increments of 10 minutes. Estimated cost:   5-10 mins $30.00   11-20 mins. $59.00   21-30 mins. $85.00     Use [email or web address] llchart (secure email communication and access to your chart) to send your primary care provider a message or make an appointment. Ask someone on your Team how to sign up for [employer].  For a Price Quote for your services, please call our Consumer Price Line at [phone].  As always, Thank you for trusting us with your health care needs!

## 2017-11-22 ENCOUNTER — ANTICOAGULATION THERAPY VISIT (OUTPATIENT)
Dept: NURSING | Facility: CLINIC | Age: 82
End: 2017-11-22
Payer: COMMERCIAL

## 2017-11-22 DIAGNOSIS — I48.20 CHRONIC ATRIAL FIBRILLATION (H): ICD-10-CM

## 2017-11-22 DIAGNOSIS — Z79.01 LONG-TERM (CURRENT) USE OF ANTICOAGULANTS: ICD-10-CM

## 2017-11-22 LAB — INR POINT OF CARE: 2.1 (ref 0.86–1.14)

## 2017-11-22 PROCEDURE — 36416 COLLJ CAPILLARY BLOOD SPEC: CPT

## 2017-11-22 PROCEDURE — 85610 PROTHROMBIN TIME: CPT | Mod: QW

## 2017-11-22 PROCEDURE — 99207 ZZC NO CHARGE NURSE ONLY: CPT

## 2017-11-22 NOTE — PROGRESS NOTES
ANTICOAGULATION FOLLOW-UP CLINIC VISIT    Patient Name:  Pranay Dubon  Date:  11/22/2017  Contact Type:  Face to Face    SUBJECTIVE:     Patient Findings     Positives No Problem Findings           OBJECTIVE    INR Protime   Date Value Ref Range Status   11/22/2017 2.1 (A) 0.86 - 1.14 Final       ASSESSMENT / PLAN  INR assessment THER    Recheck INR In: 6 WEEKS    INR Location Clinic      Anticoagulation Summary as of 11/22/2017     INR goal 2.0-3.0   Today's INR 2.1   Maintenance plan 2.5 mg (5 mg x 0.5) every day   Full instructions 2.5 mg every day   Weekly total 17.5 mg   No change documented Shana Gonzalez RN   Plan last modified Nicole Chatman RN (3/14/2017)   Next INR check 1/4/2018   Priority INR   Target end date Indefinite    Indications   Long-term (current) use of anticoagulants [Z79.01] [Z79.01]  Chronic atrial fibrillation (H) [I48.2]         Anticoagulation Episode Summary     INR check location     Preferred lab     Send INR reminders to [facility]    Comments       Anticoagulation Care Providers     Provider Role Specialty Phone number    Rose Spencer MD [facility] [phone]            See the Encounter Report to view Anticoagulation Flowsheet and Dosing Calendar (Go to Encounters tab in chart review, and find the Anticoagulation Therapy Visit)    Dosage adjustment made based on physician directed care plan.    Shana Gonzalez RN

## 2017-11-22 NOTE — MR AVS SNAPSHOT
Pranay Dubon   11/22/2017 8:00 AM   Anticoagulation Therapy Visit    Description:  82 year old male   Provider:  VANDA ANTI COAG   Department:  [facility]           INR as of 11/22/2017     Today's INR 2.1      Anticoagulation Summary as of 11/22/2017     INR goal 2.0-3.0   Today's INR 2.1   Full instructions 2.5 mg every day   Next INR check 1/4/2018    Indications   Long-term (current) use of anticoagulants [Z79.01] [Z79.01]  Chronic atrial fibrillation (H) [I48.2]         Your next Anticoagulation Clinic appointment(s)     Jan 04, 2018  8:00 AM CST   Anticoagulation Visit with VANDA ANTI COAESTER   [facility] ([facility]    [address] [address] [phone]              Contact Numbers     [facility]  Please call [phone] to cancel and/or reschedule your appointment   Please call [phone] with any problems or questions regarding your therapy.        November 2017 Details    Sun Mon Tue Wed Thu Fri Sat        1               2               3               4                 5               6               7               8               9               10               11                 12               13               14               15               16               17               18                 19               20               21               22      2.5 mg   See details      23      2.5 mg         24      2.5 mg         25      2.5 mg           26      2.5 mg         27      2.5 mg         28      2.5 mg         29      2.5 mg         30      2.5 mg            Date Details   11/22 This INR check               How to take your warfarin dose     To take:  2.5 mg Take 0.5 of a 5 mg tablet.           December 2017 Details    Sun Mon Tue Wed u Fri Sat          1      2.5 mg         2      2.5 mg           3      2.5 mg         4      2.5 mg         5      2.5 mg         6      2.5 mg         7      2.5 mg         8      2.5 mg         9       2.5 mg           10      2.5 mg         11      2.5 mg         12      2.5 mg         13      2.5 mg         14      2.5 mg         15      2.5 mg         16      2.5 mg           17      2.5 mg         18      2.5 mg         19      2.5 mg         20      2.5 mg         21      2.5 mg         22      2.5 mg         23      2.5 mg           24      2.5 mg         25      2.5 mg         26      2.5 mg         27      2.5 mg         28      2.5 mg         29      2.5 mg         30      2.5 mg           31      2.5 mg                Date Details   No additional details            How to take your warfarin dose     To take:  2.5 mg Take 0.5 of a 5 mg tablet.           January 2018 Details    Sun Mon Tue Wed Thu Fri Sat      1      2.5 mg         2      2.5 mg         3      2.5 mg         4            5               6                 7               8               9               10               11               12               13                 14               15               16               17               18               19               20                 21               22               23               24               25               26               27                 28               29               30               31                   Date Details   No additional details    Date of next INR:  1/4/2018         How to take your warfarin dose     To take:  2.5 mg Take 0.5 of a 5 mg tablet.

## 2017-11-28 ENCOUNTER — MYC MEDICAL ADVICE (OUTPATIENT)
Dept: FAMILY MEDICINE | Facility: CLINIC | Age: 82
End: 2017-11-28

## 2017-11-28 ENCOUNTER — MYC REFILL (OUTPATIENT)
Dept: FAMILY MEDICINE | Facility: CLINIC | Age: 82
End: 2017-11-28

## 2017-11-28 DIAGNOSIS — Z79.01 LONG-TERM (CURRENT) USE OF ANTICOAGULANTS: ICD-10-CM

## 2017-11-28 DIAGNOSIS — E78.5 HYPERLIPIDEMIA WITH TARGET LDL LESS THAN 100: Primary | ICD-10-CM

## 2017-11-28 RX ORDER — WARFARIN SODIUM 5 MG/1
2.5 TABLET ORAL DAILY
Qty: 30 TABLET | Status: CANCELLED | OUTPATIENT
Start: 2017-11-28

## 2017-11-28 RX ORDER — WARFARIN SODIUM 5 MG/1
2.5 TABLET ORAL DAILY
Qty: 45 TABLET | Refills: 3 | Status: SHIPPED | OUTPATIENT
Start: 2017-11-28 | End: 2017-11-30

## 2017-11-28 RX ORDER — ATORVASTATIN CALCIUM 80 MG/1
80 TABLET, FILM COATED ORAL DAILY
Qty: 90 TABLET | Refills: 3 | Status: SHIPPED | OUTPATIENT
Start: 2017-11-28 | End: 2018-10-30

## 2017-11-28 NOTE — TELEPHONE ENCOUNTER
Patient requesting order for 80mg of Atorvastatin, med list has 40mg Atorvastatin as historical.   Please advise- see [employer] message below.  Jerri Calles RN

## 2017-11-29 NOTE — TELEPHONE ENCOUNTER
[facility]:  BeltránIrene Chassammy Nov 28, 2017 10:17 AM        ----- Message -----   From: Pranay Dubon   Sent: 11/28/2017 10:08 AM   To: [facility] Team Purple  Subject: Medication Renewal Request     Original authorizing provider: MD Pranay FRY would like a refill of the following medications:  warfarin (COUMADIN) 5 MG tablet [MICHELLE YOUNGBLOOD MD]    Preferred pharmacy: [facility] # [address] [place] [address] [address]    Comment:  Please order 100 tables Jantovin 5 mg. Commonly known as: COUMADIN [facility] # [address] [place] [phone] [address] [[phone]]

## 2017-11-30 RX ORDER — WARFARIN SODIUM 5 MG/1
2.5 TABLET ORAL DAILY
Qty: 100 TABLET | Refills: 1 | Status: SHIPPED | OUTPATIENT
Start: 2017-11-30 | End: 2018-09-05

## 2017-11-30 NOTE — TELEPHONE ENCOUNTER
Patient requesting Warfarin, 100 tablets.  45 tablets was sent which is a 90 day supply  100 tabs would be a 200 day supply.  RN not aware of whether insurance covers more than a 90 day supply at a time    Routing to provider to please advise if ok to change to 100 tablets, prescription lucy'd up  Per INR nurse, they usually send a max of 90 day supply to ensure compliance.   However, this patient has not had any issues with compliance and has come to INR appts      Sherry Leblanc RN

## 2018-01-04 ENCOUNTER — ANTICOAGULATION THERAPY VISIT (OUTPATIENT)
Dept: NURSING | Facility: CLINIC | Age: 83
End: 2018-01-04
Payer: COMMERCIAL

## 2018-01-04 DIAGNOSIS — Z79.01 LONG-TERM (CURRENT) USE OF ANTICOAGULANTS: ICD-10-CM

## 2018-01-04 DIAGNOSIS — I48.20 CHRONIC ATRIAL FIBRILLATION (H): ICD-10-CM

## 2018-01-04 LAB — INR POINT OF CARE: 1.6 (ref 0.86–1.14)

## 2018-01-04 PROCEDURE — 36416 COLLJ CAPILLARY BLOOD SPEC: CPT

## 2018-01-04 PROCEDURE — 99207 ZZC NO CHARGE NURSE ONLY: CPT

## 2018-01-04 PROCEDURE — 85610 PROTHROMBIN TIME: CPT | Mod: QW

## 2018-01-04 NOTE — PROGRESS NOTES
ANTICOAGULATION FOLLOW-UP CLINIC VISIT    Patient Name:  Pranay Dubon  Date:  1/4/2018  Contact Type:  Face to Face    SUBJECTIVE:     Patient Findings     Positives No Problem Findings, Unexplained INR or factor level change           OBJECTIVE    INR Protime   Date Value Ref Range Status   01/04/2018 1.6 (A) 0.86 - 1.14 Final       ASSESSMENT / PLAN  INR assessment SUB    Recheck INR In: 2 WEEKS    INR Location Clinic      Anticoagulation Summary as of 1/4/2018     INR goal 2.0-3.0   Today's INR 1.6!   Maintenance plan 2.5 mg (5 mg x 0.5) every day   Full instructions 1/4: 5 mg; Otherwise 2.5 mg every day   Weekly total 17.5 mg   Plan last modified Nicole Chatman RN (3/14/2017)   Next INR check 1/25/2018   Priority INR   Target end date Indefinite    Indications   Long-term (current) use of anticoagulants [Z79.01] [Z79.01]  Chronic atrial fibrillation (H) [I48.2]         Anticoagulation Episode Summary     INR check location     Preferred lab     Send INR reminders to [facility]    Comments       Anticoagulation Care Providers     Provider Role Specialty Phone number    Rose Spencer MD [facility] [phone]            See the Encounter Report to view Anticoagulation Flowsheet and Dosing Calendar (Go to Encounters tab in chart review, and find the Anticoagulation Therapy Visit)    Dosage adjustment made based on physician directed care plan.    Nicole Chatman RN

## 2018-01-04 NOTE — MR AVS SNAPSHOT
Pranay Dubon   1/4/2018 8:00 AM   Anticoagulation Therapy Visit    Description:  82 year old male   Provider:  VANDA ANTI COAG   Department:  [facility]           INR as of 1/4/2018     Today's INR 1.6!      Anticoagulation Summary as of 1/4/2018     INR goal 2.0-3.0   Today's INR 1.6!   Full instructions 1/4: 5 mg; Otherwise 2.5 mg every day   Next INR check 1/25/2018    Indications   Long-term (current) use of anticoagulants [Z79.01] [Z79.01]  Chronic atrial fibrillation (H) [I48.2]         Your next Anticoagulation Clinic appointment(s)     Jan 18, 2018  8:00 AM CST   Anticoagulation Visit with VANDA ANTI MOHSEN   [facility] ([address] [address] [phone]              Contact Numbers     [facility]  Please call [phone] to cancel and/or reschedule your appointment   Please call [phone] with any problems or questions regarding your therapy.        January 2018 Details    Sun Mon Tue Wed Thu Fri Sat      1               2               3               4      5 mg   See details      5      2.5 mg         6      2.5 mg           7      2.5 mg         8      2.5 mg         9      2.5 mg         10      2.5 mg         11      2.5 mg         12      2.5 mg         13      2.5 mg           14      2.5 mg         15      2.5 mg         16      2.5 mg         17      2.5 mg         18      2.5 mg         19      2.5 mg         20      2.5 mg           21      2.5 mg         22      2.5 mg         23      2.5 mg         24      2.5 mg         25            26               27                 28               29               30               31                   Date Details   01/04 This INR check       Date of next INR:  1/25/2018         How to take your warfarin dose     To take:  2.5 mg Take 0.5 of a 5 mg tablet.    To take:  5 mg Take 1 of the 5 mg tablets.

## 2018-01-18 ENCOUNTER — ANTICOAGULATION THERAPY VISIT (OUTPATIENT)
Dept: NURSING | Facility: CLINIC | Age: 83
End: 2018-01-18
Payer: COMMERCIAL

## 2018-01-18 DIAGNOSIS — I48.20 CHRONIC ATRIAL FIBRILLATION (H): ICD-10-CM

## 2018-01-18 DIAGNOSIS — Z79.01 LONG-TERM (CURRENT) USE OF ANTICOAGULANTS: ICD-10-CM

## 2018-01-18 LAB — INR POINT OF CARE: 2.2 (ref 0.86–1.14)

## 2018-01-18 PROCEDURE — 99207 ZZC NO CHARGE NURSE ONLY: CPT

## 2018-01-18 PROCEDURE — 36416 COLLJ CAPILLARY BLOOD SPEC: CPT

## 2018-01-18 PROCEDURE — 85610 PROTHROMBIN TIME: CPT | Mod: QW

## 2018-01-18 NOTE — MR AVS SNAPSHOT
Pranay Dubon   1/18/2018 8:00 AM   Anticoagulation Therapy Visit    Description:  82 year old male   Provider:  VANDA ANTI COAG   Department:  [facility]           INR as of 1/18/2018     Today's INR 2.2      Anticoagulation Summary as of 1/18/2018     INR goal 2.0-3.0   Today's INR 2.2   Full instructions 2.5 mg every day   Next INR check 3/8/2018    Indications   Long-term (current) use of anticoagulants [Z79.01] [Z79.01]  Chronic atrial fibrillation (H) [I48.2]         Your next Anticoagulation Clinic appointment(s)     Mar 08, 2018  8:00 AM CST   Anticoagulation Visit with VANDA ANTI COAG   [facility] ([facility]    [address] [facility] [address] [phone]              Contact Numbers     [facility]  Please call [phone] to cancel and/or reschedule your appointment   Please call [phone] with any problems or questions regarding your therapy.        January 2018 Details    Sun Mon Tue Wed Thu Fri Sat      1               2               3               4               5               6                 7               8               9               10               11               12               13                 14               15               16               17               18      2.5 mg   See details      19      2.5 mg         20      2.5 mg           21      2.5 mg         22      2.5 mg         23      2.5 mg         24      2.5 mg         25      2.5 mg         26      2.5 mg         27      2.5 mg           28      2.5 mg         29      2.5 mg         30      2.5 mg         31      2.5 mg             Date Details   01/18 This INR check               How to take your warfarin dose     To take:  2.5 mg Take 0.5 of a 5 mg tablet.           February 2018 Details    Sun Mon Tue Wed Thu Fri Sat         1      2.5 mg         2      2.5 mg         3      2.5 mg           4      2.5 mg         5      2.5 mg         6      2.5 mg         7      2.5  mg         8      2.5 mg         9      2.5 mg         10      2.5 mg           11      2.5 mg         12      2.5 mg         13      2.5 mg         14      2.5 mg         15      2.5 mg         16      2.5 mg         17      2.5 mg           18      2.5 mg         19      2.5 mg         20      2.5 mg         21      2.5 mg         22      2.5 mg         23      2.5 mg         24      2.5 mg           25      2.5 mg         26      2.5 mg         27      2.5 mg         28      2.5 mg             Date Details   No additional details            How to take your warfarin dose     To take:  2.5 mg Take 0.5 of a 5 mg tablet.           March 2018 Details    Sun Mon Tue Wed Thu Fri Sat         1      2.5 mg         2      2.5 mg         3      2.5 mg           4      2.5 mg         5      2.5 mg         6      2.5 mg         7      2.5 mg         8            9               10                 11               12               13               14               15               16               17                 18               19               20               21               22               23               24                 25               26               27               28               29               30               31                Date Details   No additional details    Date of next INR:  3/8/2018         How to take your warfarin dose     To take:  2.5 mg Take 0.5 of a 5 mg tablet.

## 2018-01-18 NOTE — PROGRESS NOTES
ANTICOAGULATION FOLLOW-UP CLINIC VISIT    Patient Name:  Pranay Dubon  Date:  1/18/2018  Contact Type:  Face to Face    SUBJECTIVE:     Patient Findings     Positives No Problem Findings           OBJECTIVE    INR Protime   Date Value Ref Range Status   01/18/2018 2.2 (A) 0.86 - 1.14 Final       ASSESSMENT / PLAN  INR assessment THER    Recheck INR In: 6 WEEKS    INR Location Clinic      Anticoagulation Summary as of 1/18/2018     INR goal 2.0-3.0   Today's INR 2.2   Maintenance plan 2.5 mg (5 mg x 0.5) every day   Full instructions 2.5 mg every day   Weekly total 17.5 mg   No change documented Nicole Chatman RN   Plan last modified Nicole Chatman RN (3/14/2017)   Next INR check 3/8/2018   Priority INR   Target end date Indefinite    Indications   Long-term (current) use of anticoagulants [Z79.01] [Z79.01]  Chronic atrial fibrillation (H) [I48.2]         Anticoagulation Episode Summary     INR check location     Preferred lab     Send INR reminders to [facility]    Comments       Anticoagulation Care Providers     Provider Role Specialty Phone number    Rose Spencer MD [facility] [phone]            See the Encounter Report to view Anticoagulation Flowsheet and Dosing Calendar (Go to Encounters tab in chart review, and find the Anticoagulation Therapy Visit)    Dosage adjustment made based on physician directed care plan.    Nicole Chatman RN

## 2018-01-25 ENCOUNTER — OFFICE VISIT (OUTPATIENT)
Dept: OPHTHALMOLOGY | Facility: CLINIC | Age: 83
End: 2018-01-25
Payer: COMMERCIAL

## 2018-01-25 DIAGNOSIS — H25.813 COMBINED FORMS OF AGE-RELATED CATARACT OF BOTH EYES: ICD-10-CM

## 2018-01-25 DIAGNOSIS — Z01.01 ENCOUNTER FOR EXAMINATION OF EYES AND VISION WITH ABNORMAL FINDINGS: Primary | ICD-10-CM

## 2018-01-25 DIAGNOSIS — H52.4 PRESBYOPIA: ICD-10-CM

## 2018-01-25 DIAGNOSIS — H43.813 POSTERIOR VITREOUS DETACHMENT, BILATERAL: ICD-10-CM

## 2018-01-25 PROCEDURE — 92014 COMPRE OPH EXAM EST PT 1/>: CPT | Performed by: OPHTHALMOLOGY

## 2018-01-25 PROCEDURE — 92015 DETERMINE REFRACTIVE STATE: CPT | Performed by: OPHTHALMOLOGY

## 2018-01-25 ASSESSMENT — REFRACTION_MANIFEST
OS_AXIS: 180
OS_SPHERE: +0.75
OD_AXIS: 172
OS_CYLINDER: +0.75
OD_ADD: +2.50
OD_CYLINDER: +2.25
OS_ADD: +2.50
OD_SPHERE: -1.75

## 2018-01-25 ASSESSMENT — VISUAL ACUITY
CORRECTION_TYPE: GLASSES
OD_CC: 20/40
METHOD: SNELLEN - LINEAR
OS_CC: 20/30

## 2018-01-25 ASSESSMENT — CONF VISUAL FIELD
OS_NORMAL: 1
OD_NORMAL: 1
METHOD: COUNTING FINGERS

## 2018-01-25 ASSESSMENT — REFRACTION_WEARINGRX
OS_SPHERE: +0.75
OD_SPHERE: -2.50
OS_ADD: +3.00
OS_CYLINDER: +0.50
OD_CYLINDER: +2.00
OS_AXIS: 179
OD_ADD: +3.00
OD_AXIS: 015

## 2018-01-25 ASSESSMENT — TONOMETRY
OS_IOP_MMHG: 13
OD_IOP_MMHG: 11
IOP_METHOD: APPLANATION

## 2018-01-25 ASSESSMENT — EXTERNAL EXAM - RIGHT EYE: OD_EXAM: MILD TO MOD BROW, PROLAPSED FAT PADS: UPPER, LOWER

## 2018-01-25 ASSESSMENT — SLIT LAMP EXAM - LIDS
COMMENTS: NORMAL
COMMENTS: NORMAL

## 2018-01-25 ASSESSMENT — CUP TO DISC RATIO
OS_RATIO: 0.7
OD_RATIO: 0.6

## 2018-01-25 ASSESSMENT — EXTERNAL EXAM - LEFT EYE: OS_EXAM: MILD TO MOD BROW, PROLAPSED FAT PADS: UPPER, LOWER

## 2018-01-25 NOTE — PROGRESS NOTES
Current Eye Medications:  no     Subjective:  Complete exam with new glasses rx and Cataract check. Vision is blurry in distance both eyes. Having trouble with green road signs, can't figure out where he is at while driving. See double at night in far distance, more then 10 car lengths. Also has trouble with on coming traffic at night, the new bright headlights. Zero pain, or discomfort both eyes.     RH.  No glasses reading; wears at [employer].  Didn't tolerate bifocals.  Had a trial of Flomax in past - less than 30 days.  Coumadin for A fib; was off for 3 days with tooth extraction.     Objective:  See Ophthalmology Exam.       Assessment:  Visually significant cataract right eye.      ICD-10-CM    1. Encounter for examination of eyes and vision with abnormal findings Z01.01 REFRACTIVE STATUS   2. Presbyopia H52.4 REFRACTIVE STATUS   3. Combined forms of age-related cataract, mod, both eyes H25.813 EYE EXAM (SIMPLE-NONBILLABLE)   4. Posterior vitreous detachment, bilateral H43.813          Plan:  Glasses Rx given - optional   Discussed  Kelman phacoemulsification/ posterior chamber lens right eye local standby.  Risks, benefits, complications, and alternatives discussed with patient including possibility of limitations from coexistent eye disease and loss of vision.  Target refraction and multifocal lens options discussed.  Patient understands.    Offered cataract surgery right eye at anytime. Call Mercedes BURGOS @ [phone] to schedule.     Otherwise Call in September 2018 for an appointment in January 2019 for Complete Exam    Dr. Salazar [phone]    Plan Kelman phacoemulsification/ posterior chamber lens right eye local standby.  Risks, benefits, complications, and alternatives discussed with patient including possibility of limitations from coexistent eye disease and loss of vision.  Target refraction and multifocal lens options discussed.  Patient understands.  Kaushik Salazar M.D.

## 2018-01-25 NOTE — LETTER
1/25/2018         RE: Pranay Dubon  [address] [place] [address]        Dear Colleague,    Thank you for referring your patient, Pranay Dubon, to the [facility]. Please see a copy of my visit note below.     Current Eye Medications:  no     Subjective:  Complete exam with new glasses rx and Cataract check. Vision is blurry in distance both eyes. Having trouble with green road signs, can't figure out where he is at while driving. See double at night in far distance, more then 10 car lengths. Also has trouble with on coming traffic at night, the new bright headlights. Zero pain, or discomfort both eyes.     RH.  No glasses reading; wears at [employer].  Didn't tolerate bifocals.  Had a trial of Flomax in past - less than 30 days.  Coumadin for A fib; was off for 3 days with tooth extraction.     Objective:  See Ophthalmology Exam.       Assessment:  Visually significant cataract right eye.      ICD-10-CM    1. Encounter for examination of eyes and vision with abnormal findings Z01.01 REFRACTIVE STATUS   2. Presbyopia H52.4 REFRACTIVE STATUS   3. Combined forms of age-related cataract, mod, both eyes H25.813 EYE EXAM (SIMPLE-NONBILLABLE)   4. Posterior vitreous detachment, bilateral H43.813          Plan:  Glasses Rx given - optional   Discussed  Kelman phacoemulsification/ posterior chamber lens right eye local standby.  Risks, benefits, complications, and alternatives discussed with patient including possibility of limitations from coexistent eye disease and loss of vision.  Target refraction and multifocal lens options discussed.  Patient understands.    Offered cataract surgery right eye at anytime. Call Mercedes BURGOS @ [phone] to schedule.     Otherwise Call in September 2018 for an appointment in January 2019 for Complete Exam    Dr. Salazar [phone]    Plan Kelman phacoemulsification/ posterior chamber lens right eye local standby.  Risks, benefits, complications, and  alternatives discussed with patient including possibility of limitations from coexistent eye disease and loss of vision.  Target refraction and multifocal lens options discussed.  Patient understands.  Kaushik Salazar M.D.             Again, thank you for allowing me to participate in the care of your patient.        Sincerely,        Kaushik Salazar MD

## 2018-01-25 NOTE — PATIENT INSTRUCTIONS
Glasses Rx given - optional   Discussed  Kelman phacoemulsification/ posterior chamber lens right eye local standby.  Risks, benefits, complications, and alternatives discussed with patient including possibility of limitations from coexistent eye disease and loss of vision.  Target refraction and multifocal lens options discussed.  Patient understands.    Offered cataract surgery right eye at anytime. Call Mercedes BURGOS @ [phone] to schedule.     Otherwise Call in September 2018 for an appointment in January 2019 for Complete Exam    Dr. Salazar [phone]

## 2018-01-25 NOTE — MR AVS SNAPSHOT
After Visit Summary   1/25/2018    Pranay Dubon    MRN: [id number]           Patient Information     Date Of Birth          1935        Visit Information        Provider Department      1/25/2018 8:30 AM Kaushik Salazar MD [facility] [place]        Today's Diagnoses     Encounter for examination of eyes and vision with abnormal findings    -  1    Presbyopia        Combined forms of age-related cataract mid to mod both eyes        Posterior vitreous detachment, bilateral          Care Instructions    Glasses Rx given - optional   Discussed  Kelman phacoemulsification/ posterior chamber lens right eye local standby.  Risks, benefits, complications, and alternatives discussed with patient including possibility of limitations from coexistent eye disease and loss of vision.  Target refraction and multifocal lens options discussed.  Patient understands.    Offered cataract surgery right eye at anytime. Call Mercedes BURGOS @ [phone] to schedule.     Otherwise Call in September 2018 for an appointment in January 2019 for Complete Exam    Dr. Salazar [phone]            Follow-ups after your visit        Your next 10 appointments already scheduled     Mar 08, 2018  8:00 AM CST   Anticoagulation Visit with FZ ANTI COAG   [facility] [place] ([facility] [place])    [address] [place] [address]  [place] [address] [phone]              Who to contact     If you have questions or need follow up information about today's clinic visit or your schedule please contact [facility] [place] directly at [phone].  Normal or non-critical lab and imaging results will be communicated to you by MyChart, letter or phone within 4 business days after the clinic has received the results. If you do not hear from us within 7 days, please contact the clinic through MyChart or phone. If you have a critical or abnormal lab result, we will notify you by phone as soon as possible.  Submit refill  requests through [facility] or call your pharmacy and they will forward the refill request to us. Please allow 3 business days for your refill to be completed.          Additional Information About Your Visit        [employer] Information     [employer] gives you secure access to your electronic health record. If you see a primary care provider, you can also send messages to your care team and make appointments. If you have questions, please call your primary care clinic.  If you do not have a primary care provider, please call [phone] and they will assist you.        Care EveryWhere ID     This is your Care EveryWhere ID. This could be used by other organizations to access your [facility] medical records  [id number]         Blood Pressure from Last 3 Encounters:   10/16/17 122/70   02/01/17 100/63   01/25/17 112/64    Weight from Last 3 Encounters:   10/16/17 78.9 kg (174 lb)   01/25/17 81.6 kg (180 lb)   08/11/16 82.1 kg (181 lb)              We Performed the Following     EYE EXAM (SIMPLE-NONBILLABLE)     REFRACTIVE STATUS        Primary Care Provider Office Phone # Fax #    Rose Spencer [phone]       [address] [address]        Equal Access to Services     GABE IGLESIAS AH: Hadii aad ku hadasho Soomaali, waaxda luqadaha, qaybta kaalmada adeegyada, waxay barbra lozada. So [facility] [phone].    ATENCIÓN: Si habla español, tiene a bowens disposición servicios gratuitos de asistencia lingüística. Llame al [phone].    We comply with applicable federal civil rights laws and [place] laws. We do not discriminate on the basis of race, color, national origin, age, disability, sex, sexual orientation, or gender identity.            Thank you!     Thank you for choosing [facility]  for your care. Our goal is always to provide you with excellent care. Hearing back from our patients is one way we can continue to  improve our services. Please take a few minutes to complete the written survey that you may receive in the mail after your visit with us. Thank you!             Your Updated Medication List - Protect others around you: Learn how to safely use, store and throw away your medicines at www.disposemymeds.org.          This list is accurate as of 1/25/18  9:56 AM.  Always use your most recent med list.                   Brand Name Dispense Instructions for use Diagnosis    aspirin 81 MG tablet      Take 81 mg by mouth daily        atorvastatin 80 MG tablet    LIPITOR    90 tablet    Take 1 tablet (80 mg) by mouth daily    Hyperlipidemia with target LDL less than 100       doxazosin 4 MG tablet    CARDURA     Take 4 mg by mouth At Bedtime        METAMUCIL PO           metoprolol tartrate 50 MG tablet    LOPRESSOR    180 tablet    Take 1/2 tablet twice a day    Benign essential hypertension       NITROSTAT SL      Place 0.4 mg under the tongue        VIAGRA 25 MG tablet   Generic drug:  sildenafil      Take 25 mg by mouth as needed. As needed        warfarin 5 MG tablet    COUMADIN    100 tablet    Take 0.5 tablets (2.5 mg) by mouth daily    Long-term (current) use of anticoagulants

## 2018-01-26 PROBLEM — H25.813 COMBINED FORMS OF AGE-RELATED CATARACT OF BOTH EYES: Status: ACTIVE | Noted: 2018-01-26

## 2018-01-26 PROBLEM — H26.9 CATARACT: Status: RESOLVED | Noted: 2017-01-21 | Resolved: 2018-01-26

## 2018-03-06 ENCOUNTER — MYC MEDICAL ADVICE (OUTPATIENT)
Dept: OPHTHALMOLOGY | Facility: CLINIC | Age: 83
End: 2018-03-06

## 2018-03-06 NOTE — TELEPHONE ENCOUNTER
Printed and gave to Mercedes, she is going to call patient tomorrow, sent my chart message letting him know.

## 2018-03-08 ENCOUNTER — ANTICOAGULATION THERAPY VISIT (OUTPATIENT)
Dept: NURSING | Facility: CLINIC | Age: 83
End: 2018-03-08
Payer: COMMERCIAL

## 2018-03-08 DIAGNOSIS — Z79.01 LONG-TERM (CURRENT) USE OF ANTICOAGULANTS: ICD-10-CM

## 2018-03-08 DIAGNOSIS — I48.20 CHRONIC ATRIAL FIBRILLATION (H): ICD-10-CM

## 2018-03-08 LAB — INR POINT OF CARE: 1.6 (ref 0.86–1.14)

## 2018-03-08 PROCEDURE — 85610 PROTHROMBIN TIME: CPT | Mod: QW

## 2018-03-08 PROCEDURE — 99207 ZZC NO CHARGE NURSE ONLY: CPT

## 2018-03-08 PROCEDURE — 36416 COLLJ CAPILLARY BLOOD SPEC: CPT

## 2018-03-08 NOTE — MR AVS SNAPSHOT
Pranay Dubon   3/8/2018 8:00 AM   Anticoagulation Therapy Visit    Description:  82 year old male   Provider:  VANDA ANTI COAG   Department:  [facility]           INR as of 3/8/2018     Today's INR 1.6!      Anticoagulation Summary as of 3/8/2018     INR goal 2.0-3.0   Today's INR 1.6!   Full instructions 3/8: 5 mg; Otherwise 2.5 mg every day   Next INR check 3/29/2018    Indications   Long-term (current) use of anticoagulants [Z79.01] [Z79.01]  Chronic atrial fibrillation (H) [I48.2]         Your next Anticoagulation Clinic appointment(s)     Mar 29, 2018  8:00 AM CDT   Anticoagulation Visit with VANDA ANTI MOHSEN   [facility] ([address] [address] [phone]              Contact Numbers     [facility]  Please call [phone] to cancel and/or reschedule your appointment   Please call [phone] with any problems or questions regarding your therapy.        March 2018 Details    Sun Mon Tue Wed Thu Fri Sat         1               2               3                 4               5               6               7               8      5 mg   See details      9      2.5 mg         10      2.5 mg           11      2.5 mg         12      2.5 mg         13      2.5 mg         14      2.5 mg         15      2.5 mg         16      2.5 mg         17      2.5 mg           18      2.5 mg         19      2.5 mg         20      2.5 mg         21      2.5 mg         22      2.5 mg         23      2.5 mg         24      2.5 mg           25      2.5 mg         26      2.5 mg         27      2.5 mg         28      2.5 mg         29            30               31                Date Details   03/08 This INR check       Date of next INR:  3/29/2018         How to take your warfarin dose     To take:  2.5 mg Take 0.5 of a 5 mg tablet.    To take:  5 mg Take 1 of the 5 mg tablets.

## 2018-03-08 NOTE — PROGRESS NOTES
ANTICOAGULATION FOLLOW-UP CLINIC VISIT    Patient Name:  Pranay Dubon  Date:  3/8/2018  Contact Type:  Face to Face    SUBJECTIVE:     Patient Findings     Positives No Problem Findings, Unexplained INR or factor level change           OBJECTIVE    INR Protime   Date Value Ref Range Status   03/08/2018 1.6 (A) 0.86 - 1.14 Final       ASSESSMENT / PLAN  INR assessment SUB    Recheck INR In: 3 WEEKS    INR Location Clinic      Anticoagulation Summary as of 3/8/2018     INR goal 2.0-3.0   Today's INR 1.6!   Maintenance plan 2.5 mg (5 mg x 0.5) every day   Full instructions 3/8: 5 mg; Otherwise 2.5 mg every day   Weekly total 17.5 mg   Plan last modified Nicole Chatman RN (3/14/2017)   Next INR check 3/29/2018   Priority INR   Target end date Indefinite    Indications   Long-term (current) use of anticoagulants [Z79.01] [Z79.01]  Chronic atrial fibrillation (H) [I48.2]         Anticoagulation Episode Summary     INR check location     Preferred lab     Send INR reminders to [facility] CL[facility]    Comments       Anticoagulation Care Providers     Provider Role Specialty Phone number    Rose Spencer MD [facility] [phone]            See the Encounter Report to view Anticoagulation Flowsheet and Dosing Calendar (Go to Encounters tab in chart review, and find the Anticoagulation Therapy Visit)    Dosage adjustment made based on physician directed care plan.    Nicole Chatman RN

## 2018-03-12 ENCOUNTER — MYC MEDICAL ADVICE (OUTPATIENT)
Dept: OPHTHALMOLOGY | Facility: CLINIC | Age: 83
End: 2018-03-12

## 2018-03-26 ENCOUNTER — MYC MEDICAL ADVICE (OUTPATIENT)
Dept: FAMILY MEDICINE | Facility: CLINIC | Age: 83
End: 2018-03-26

## 2018-03-26 NOTE — TELEPHONE ENCOUNTER
Spoke with pharmacy they can dispense the 100 tabs of Jantoven but the 90 tablets is max for Atorvastatin.  See [employer] message.   Jerri Calles RN

## 2018-03-29 ENCOUNTER — TELEPHONE (OUTPATIENT)
Dept: FAMILY MEDICINE | Facility: CLINIC | Age: 83
End: 2018-03-29

## 2018-03-29 ENCOUNTER — ANTICOAGULATION THERAPY VISIT (OUTPATIENT)
Dept: NURSING | Facility: CLINIC | Age: 83
End: 2018-03-29
Payer: COMMERCIAL

## 2018-03-29 DIAGNOSIS — Z79.01 LONG-TERM (CURRENT) USE OF ANTICOAGULANTS: ICD-10-CM

## 2018-03-29 DIAGNOSIS — I48.20 CHRONIC ATRIAL FIBRILLATION (H): ICD-10-CM

## 2018-03-29 DIAGNOSIS — I48.20 CHRONIC ATRIAL FIBRILLATION (H): Primary | ICD-10-CM

## 2018-03-29 LAB — INR POINT OF CARE: 1.8 (ref 0.86–1.14)

## 2018-03-29 PROCEDURE — 99207 ZZC NO CHARGE NURSE ONLY: CPT

## 2018-03-29 PROCEDURE — 36416 COLLJ CAPILLARY BLOOD SPEC: CPT

## 2018-03-29 PROCEDURE — 85610 PROTHROMBIN TIME: CPT | Mod: QW

## 2018-03-29 NOTE — MR AVS SNAPSHOT
Pranay KELVIN Jagdish   3/29/2018 8:00 AM   Anticoagulation Therapy Visit    Description:  82 year old male   Provider:  VANDA ANTI COAG   Department:  [facility]           INR as of 3/29/2018     Today's INR 1.8!      Anticoagulation Summary as of 3/29/2018     INR goal 2.0-3.0   Today's INR 1.8!   Full instructions 5 mg on Mon; 2.5 mg all other days   Next INR check 4/19/2018    Indications   Long-term (current) use of anticoagulants [Z79.01] [Z79.01]  Chronic atrial fibrillation (H) [I48.2]         Your next Anticoagulation Clinic appointment(s)     Apr 19, 2018  8:00 AM CDT   [facility] with VANDA ANTI COAESTER   [facility] [address] [phone]              Contact Numbers     [facility]  Please call [phone] to cancel and/or reschedule your appointment   Please call [phone] with any problems or questions regarding your therapy.        March 2018 Details    Sun Mon Tue Wed Thu Fri Sat         1               2               3                 4               5               6               7               8               9               10                 11               12               13               14               15               16               17                 18               19               20               21               22               23               24                 25               26               27               28               29      2.5 mg   See details      30      2.5 mg         31      2.5 mg          Date Details   03/29 This INR check               How to take your warfarin dose     To take:  2.5 mg Take 0.5 of a 5 mg tablet.           April 2018 Details    Sun Mon Tue Wed Thu Fri Sat     1      2.5 mg         2      5 mg         3      2.5 mg         4      2.5 mg         5      2.5 mg         6      2.5 mg         7      2.5 mg           8      2.5 mg         9      5 mg         10       2.5 mg         11      2.5 mg         12      2.5 mg         13      2.5 mg         14      2.5 mg           15      2.5 mg         16      5 mg         17      2.5 mg         18      2.5 mg         19            20               21                 22               23               24               25               26               27               28                 29               30                     Date Details   No additional details    Date of next INR:  4/19/2018         How to take your warfarin dose     To take:  2.5 mg Take 0.5 of a 5 mg tablet.    To take:  5 mg Take 1 of the 5 mg tablets.

## 2018-03-29 NOTE — TELEPHONE ENCOUNTER
Has the patient previously taken warfarin? yes  If yes, for what indication? Atrial Fibrillation    Does the patient have any of the following indications for a higher range of 2.5-3.5:    Mitral position mechanical valve? no    Carter-Shiley, Ball and Cage or Monoleaflet valve (regardless of position) no    Other (if yes, please explain) no    Annual INR referral needed.  Orders teed up.    Nicole Chatman RN - BLANK [facility]

## 2018-03-29 NOTE — PROGRESS NOTES
ANTICOAGULATION FOLLOW-UP CLINIC VISIT    Patient Name:  Pranay Dubon  Date:  3/29/2018  Contact Type:  Face to Face    SUBJECTIVE:     Patient Findings     Positives No Problem Findings, Unexplained INR or factor level change    Comments Due to 2 sub therapeutic INR's the maintenance dose was increased by 14%           OBJECTIVE    INR Protime   Date Value Ref Range Status   03/29/2018 1.8 (A) 0.86 - 1.14 Final       ASSESSMENT / PLAN  INR assessment SUB    Recheck INR In: 3 WEEKS    INR Location Clinic      Anticoagulation Summary as of 3/29/2018     INR goal 2.0-3.0   Today's INR 1.8!   Maintenance plan 5 mg (5 mg x 1) on Mon; 2.5 mg (5 mg x 0.5) all other days   Full instructions 5 mg on Mon; 2.5 mg all other days   Weekly total 20 mg   Plan last modified Nicole Chatman, RN (3/29/2018)   Next INR check 4/19/2018   Priority INR   Target end date Indefinite    Indications   Long-term (current) use of anticoagulants [Z79.01] [Z79.01]  Chronic atrial fibrillation (H) [I48.2]         Anticoagulation Episode Summary     INR check location     Preferred lab     Send INR reminders to [facility]    Comments       Anticoagulation Care Providers     Provider Role Specialty Phone number    Rose Spencer MD [facility] [phone]            See the Encounter Report to view Anticoagulation Flowsheet and Dosing Calendar (Go to Encounters tab in chart review, and find the Anticoagulation Therapy Visit)    Dosage adjustment made based on physician directed care plan.    Nicole Chatman RN

## 2018-04-19 ENCOUNTER — ANTICOAGULATION THERAPY VISIT (OUTPATIENT)
Dept: NURSING | Facility: CLINIC | Age: 83
End: 2018-04-19
Payer: COMMERCIAL

## 2018-04-19 DIAGNOSIS — I48.20 CHRONIC ATRIAL FIBRILLATION (H): ICD-10-CM

## 2018-04-19 DIAGNOSIS — Z79.01 LONG-TERM (CURRENT) USE OF ANTICOAGULANTS: ICD-10-CM

## 2018-04-19 LAB — INR POINT OF CARE: 2 (ref 0.86–1.14)

## 2018-04-19 PROCEDURE — 85610 PROTHROMBIN TIME: CPT | Mod: QW

## 2018-04-19 PROCEDURE — 36416 COLLJ CAPILLARY BLOOD SPEC: CPT

## 2018-04-19 PROCEDURE — 99207 ZZC NO CHARGE NURSE ONLY: CPT

## 2018-04-19 NOTE — MR AVS SNAPSHOT
Pranay KELVIN Jagdish   4/19/2018 8:00 AM   Anticoagulation Therapy Visit    Description:  82 year old male   Provider:  VANDA ANTI COAG   Department:  [facility]           INR as of 4/19/2018     Today's INR 2.0      Anticoagulation Summary as of 4/19/2018     INR goal 2.0-3.0   Today's INR 2.0   Full instructions 5 mg on Mon; 2.5 mg all other days   Next INR check 5/31/2018    Indications   Long-term (current) use of anticoagulants [Z79.01] [Z79.01]  Chronic atrial fibrillation (H) [I48.2]         Your next Anticoagulation Clinic appointment(s)     May 31, 2018  8:00 AM CDT   Anticoagulation Visit with VANDA ANTI COAESTER   [facility] ([facility]    [address] [place] [address] [phone]              Contact Numbers     [facility]  Please call [phone] to cancel and/or reschedule your appointment   Please call [phone] with any problems or questions regarding your therapy.        April 2018 Details    Sun Mon Tue Wed Thu Fri Sat     1               2               3               4               5               6               7                 8               9               10               11               12               13               14                 15               16               17               18               19      2.5 mg   See details      20      2.5 mg         21      2.5 mg           22      2.5 mg         23      5 mg         24      2.5 mg         25      2.5 mg         26      2.5 mg         27      2.5 mg         28      2.5 mg           29      2.5 mg         30      5 mg               Date Details   04/19 This INR check               How to take your warfarin dose     To take:  2.5 mg Take 0.5 of a 5 mg tablet.    To take:  5 mg Take 1 of the 5 mg tablets.           May 2018 Details    Sun Mon Tue Wed Thu Fri Sat       1      2.5 mg         2      2.5 mg         3      2.5 mg         4      2.5 mg         5      2.5 mg           6       2.5 mg         7      5 mg         8      2.5 mg         9      2.5 mg         10      2.5 mg         11      2.5 mg         12      2.5 mg           13      2.5 mg         14      5 mg         15      2.5 mg         16      2.5 mg         17      2.5 mg         18      2.5 mg         19      2.5 mg           20      2.5 mg         21      5 mg         22      2.5 mg         23      2.5 mg         24      2.5 mg         25      2.5 mg         26      2.5 mg           27      2.5 mg         28      5 mg         29      2.5 mg         30      2.5 mg         31               Date Details   No additional details    Date of next INR:  5/31/2018         How to take your warfarin dose     To take:  2.5 mg Take 0.5 of a 5 mg tablet.    To take:  5 mg Take 1 of the 5 mg tablets.

## 2018-04-19 NOTE — PROGRESS NOTES
ANTICOAGULATION FOLLOW-UP CLINIC VISIT    Patient Name:  Pranay Dubon  Date:  4/19/2018  Contact Type:  Face to Face    SUBJECTIVE:     Patient Findings     Positives No Problem Findings           OBJECTIVE    INR Protime   Date Value Ref Range Status   04/19/2018 2.0 (A) 0.86 - 1.14 Final       ASSESSMENT / PLAN  INR assessment THER    Recheck INR In: 6 WEEKS    INR Location Clinic      Anticoagulation Summary as of 4/19/2018     INR goal 2.0-3.0   Today's INR 2.0   Maintenance plan 5 mg (5 mg x 1) on Mon; 2.5 mg (5 mg x 0.5) all other days   Full instructions 5 mg on Mon; 2.5 mg all other days   Weekly total 20 mg   No change documented Nicole Chatman RN   Plan last modified Nicole Chatman RN (3/29/2018)   Next INR check 5/31/2018   Priority INR   Target end date Indefinite    Indications   Long-term (current) use of anticoagulants [Z79.01] [Z79.01]  Chronic atrial fibrillation (H) [I48.2]         Anticoagulation Episode Summary     INR check location     Preferred lab     Send INR reminders to [facility]    Comments       Anticoagulation Care Providers     Provider Role Specialty Phone number    Rose Spencer MD [facility] [phone]            See the Encounter Report to view Anticoagulation Flowsheet and Dosing Calendar (Go to Encounters tab in chart review, and find the Anticoagulation Therapy Visit)    Dosage adjustment made based on physician directed care plan.    Nicole Chatman RN

## 2018-05-22 ENCOUNTER — ANTICOAGULATION THERAPY VISIT (OUTPATIENT)
Dept: NURSING | Facility: CLINIC | Age: 83
End: 2018-05-22
Payer: COMMERCIAL

## 2018-05-22 DIAGNOSIS — I48.20 CHRONIC ATRIAL FIBRILLATION (H): ICD-10-CM

## 2018-05-22 DIAGNOSIS — Z79.01 LONG-TERM (CURRENT) USE OF ANTICOAGULANTS: ICD-10-CM

## 2018-05-22 LAB — INR POINT OF CARE: 1.2 (ref 0.86–1.14)

## 2018-05-22 PROCEDURE — 36416 COLLJ CAPILLARY BLOOD SPEC: CPT

## 2018-05-22 PROCEDURE — 85610 PROTHROMBIN TIME: CPT | Mod: QW

## 2018-05-22 PROCEDURE — 99207 ZZC NO CHARGE NURSE ONLY: CPT

## 2018-05-22 NOTE — MR AVS SNAPSHOT
Pranay KELVIN Jagdish   5/22/2018 8:00 AM   Anticoagulation Therapy Visit    Description:  82 year old male   Provider:  VANDA ANTI COAG   Department:  [facility]           INR as of 5/22/2018     Today's INR       Anticoagulation Summary as of 5/22/2018     INR goal 2.0-3.0   Today's INR    Full instructions 5/22: Hold; 5/23: 5 mg; 5/24: 5 mg; Otherwise 5 mg on Mon; 2.5 mg all other days   Next INR check 5/31/2018    Indications   Long-term (current) use of anticoagulants [Z79.01] [Z79.01]  Chronic atrial fibrillation (H) [I48.2]         Your next Anticoagulation Clinic appointment(s)     May 31, 2018  8:00 AM CDT   Anticoagulation Visit with FZ ANTI COAG   [facility] ([address] [id number] [phone]              Contact Numbers     [facility]  Please call [phone] to cancel and/or reschedule your appointment   Please call [phone] with any problems or questions regarding your therapy.        May 2018 Details    Sun Mon Tue Wed Thu Fri Sat       1               2               3               4               5                 6               7               8               9               10               11               12                 13               14               15               16               17               18               19                 20               21               22      Hold   See details      23      5 mg         24      5 mg         25      2.5 mg         26      2.5 mg           27      2.5 mg         28      5 mg         29      2.5 mg         30      2.5 mg         31               Date Details   05/22 This INR check       Date of next INR:  5/31/2018         How to take your warfarin dose     To take:  2.5 mg Take 0.5 of a 5 mg tablet.    To take:  5 mg Take 1 of the 5 mg tablets.    Hold Do not take your warfarin dose. See the Details table to the right for additional instructions.

## 2018-05-22 NOTE — PROGRESS NOTES
ANTICOAGULATION FOLLOW-UP CLINIC VISIT    Patient Name:  Pranay Dubon  Date:  5/22/2018  Contact Type:  Face to Face    SUBJECTIVE:     Patient Findings     Positives Intentional hold of therapy, No Problem Findings    Comments Patient have cataract surgery tomorrow           OBJECTIVE    INR Protime   Date Value Ref Range Status   05/22/2018 1.2 (A) 0.86 - 1.14 Final       ASSESSMENT / PLAN  INR assessment SUB    Recheck INR In: 1 WEEK    INR Location Clinic      Anticoagulation Summary as of 5/22/2018     INR goal 2.0-3.0   Today's INR 1.2!   Maintenance plan 5 mg (5 mg x 1) on Mon; 2.5 mg (5 mg x 0.5) all other days   Full instructions 5/22: Hold; 5/23: 5 mg; 5/24: 5 mg; Otherwise 5 mg on Mon; 2.5 mg all other days   Weekly total 20 mg   Plan last modified Nicole Chatman RN (3/29/2018)   Next INR check 5/31/2018   Priority INR   Target end date Indefinite    Indications   Long-term (current) use of anticoagulants [Z79.01] [Z79.01]  Chronic atrial fibrillation (H) [I48.2]         Anticoagulation Episode Summary     INR check location     Preferred lab     Send INR reminders to [facility]    Comments       Anticoagulation Care Providers     Provider Role Specialty Phone number    Rose Spencer MD [facility] [phone]            See the Encounter Report to view Anticoagulation Flowsheet and Dosing Calendar (Go to Encounters tab in chart review, and find the Anticoagulation Therapy Visit)    Dosage adjustment made based on physician directed care plan.    Nicole Chatman, MARY

## 2018-05-31 ENCOUNTER — ANTICOAGULATION THERAPY VISIT (OUTPATIENT)
Dept: NURSING | Facility: CLINIC | Age: 83
End: 2018-05-31
Payer: COMMERCIAL

## 2018-05-31 DIAGNOSIS — Z79.01 LONG-TERM (CURRENT) USE OF ANTICOAGULANTS: ICD-10-CM

## 2018-05-31 DIAGNOSIS — I48.20 CHRONIC ATRIAL FIBRILLATION (H): ICD-10-CM

## 2018-05-31 LAB — INR POINT OF CARE: 2.2 (ref 0.86–1.14)

## 2018-05-31 PROCEDURE — 99207 ZZC NO CHARGE NURSE ONLY: CPT

## 2018-05-31 PROCEDURE — 85610 PROTHROMBIN TIME: CPT | Mod: QW

## 2018-05-31 PROCEDURE — 36416 COLLJ CAPILLARY BLOOD SPEC: CPT

## 2018-05-31 NOTE — MR AVS SNAPSHOT
Pranay KELVIN Jagdish   5/31/2018 8:00 AM   Anticoagulation Therapy Visit    Description:  82 year old male   Provider:  VANDA ANTI COAG   Department:  [facility]           INR as of 5/31/2018     Today's INR 2.2      Anticoagulation Summary as of 5/31/2018     INR goal 2.0-3.0   Today's INR 2.2   Full warfarin instructions 6/21: Hold; 6/22: Hold; 6/23: Hold; 6/24: Hold; 6/25: Hold; 6/26: 25 mg; 6/27: 25 mg; Otherwise 5 mg on Mon; 2.5 mg all other days   Next INR check     Indications   Long-term (current) use of anticoagulants [Z79.01] [Z79.01]  Chronic atrial fibrillation (H) [I48.2]         Your next Anticoagulation Clinic appointment(s)     Jun 25, 2018  8:00 AM CDT   Anticoagulation Visit with VANDA ANTI COAESTER   [facility] ([address] [address] [phone]              Contact Numbers     [facility]  Please call [phone] to cancel and/or reschedule your appointment   Please call [phone] with any problems or questions regarding your therapy.        May 2018 Details    Sun Mon Tue Wed Thu Fri Sat       1               2               3               4               5                 6               7               8               9               10               11               12                 13               14               15               16               17               18               19                 20               21               22               23               24               25               26                 27               28               29               30               31      2.5 mg   See details         Date Details   05/31 This INR check      Date of next INR: No date specified         How to take your warfarin dose     To take:  2.5 mg Take 0.5 of a 5 mg tablet.           June 2018 Details    Sun Mon Tue Wed Thu Fri Sat          1      2.5 mg         2      2.5 mg           3      2.5 mg         4      5 mg          5      2.5 mg         6      2.5 mg         7      2.5 mg         8      2.5 mg         9      2.5 mg           10      2.5 mg         11      5 mg         12      2.5 mg         13      2.5 mg         14      2.5 mg         15      2.5 mg         16      2.5 mg           17      2.5 mg         18      5 mg         19      2.5 mg         20      2.5 mg         21      Hold         22      Hold         23      Hold           24      Hold         25      Hold         26      25 mg         27      25 mg         28      2.5 mg         29      2.5 mg         30      2.5 mg          Date Details   No additional details            How to take your warfarin dose     To take:  2.5 mg Take 0.5 of a 5 mg tablet.    To take:  5 mg Take 1 of the 5 mg tablets.    To take:  25 mg Take 5 of the 5 mg tablets.    Hold Do not take your warfarin dose. See the Details table to the right for additional instructions.

## 2018-05-31 NOTE — PROGRESS NOTES
ANTICOAGULATION FOLLOW-UP CLINIC VISIT    Patient Name:  Pranay Dubon  Date:  5/31/2018  Contact Type:  Face to Face    SUBJECTIVE:     Patient Findings     Positives No Problem Findings           OBJECTIVE    INR Protime   Date Value Ref Range Status   05/31/2018 2.2 (A) 0.86 - 1.14 Final       ASSESSMENT / PLAN  INR assessment THER    Recheck INR In: 4 WEEKS    INR Location Clinic      Anticoagulation Summary as of 5/31/2018     INR goal 2.0-3.0   Today's INR 2.2   Warfarin maintenance plan 5 mg (5 mg x 1) on Mon; 2.5 mg (5 mg x 0.5) all other days   Full warfarin instructions 6/21: Hold; 6/22: Hold; 6/23: Hold; 6/24: Hold; 6/25: Hold; 6/26: 25 mg; 6/27: 25 mg; Otherwise 5 mg on Mon; 2.5 mg all other days   Weekly warfarin total 20 mg   Plan last modified Nicole Chatman RN (3/29/2018)   Next INR check 6/26/2018   Priority INR   Target end date Indefinite    Indications   Long-term (current) use of anticoagulants [Z79.01] [Z79.01]  Chronic atrial fibrillation (H) [I48.2]         Anticoagulation Episode Summary     INR check location     Preferred lab     Send INR reminders to [facility]    Comments       Anticoagulation Care Providers     Provider Role Specialty Phone number    Rose Spencer MD [facility] [phone]            See the Encounter Report to view Anticoagulation Flowsheet and Dosing Calendar (Go to Encounters tab in chart review, and find the Anticoagulation Therapy Visit)    Dosage adjustment made based on physician directed care plan.    Nicole Chatman RN

## 2018-06-25 ENCOUNTER — ANTICOAGULATION THERAPY VISIT (OUTPATIENT)
Dept: NURSING | Facility: CLINIC | Age: 83
End: 2018-06-25
Payer: COMMERCIAL

## 2018-06-25 DIAGNOSIS — Z79.01 LONG-TERM (CURRENT) USE OF ANTICOAGULANTS: ICD-10-CM

## 2018-06-25 DIAGNOSIS — I48.20 CHRONIC ATRIAL FIBRILLATION (H): ICD-10-CM

## 2018-06-25 LAB — INR POINT OF CARE: 1.2 (ref 0.86–1.14)

## 2018-06-25 PROCEDURE — 85610 PROTHROMBIN TIME: CPT | Mod: QW

## 2018-06-25 PROCEDURE — 36416 COLLJ CAPILLARY BLOOD SPEC: CPT

## 2018-06-25 PROCEDURE — 99207 ZZC NO CHARGE NURSE ONLY: CPT

## 2018-06-25 NOTE — PROGRESS NOTES
ANTICOAGULATION FOLLOW-UP CLINIC VISIT    Patient Name:  Pranay Dubon  Date:  6/25/2018  Contact Type:  Face to Face    SUBJECTIVE:     Patient Findings     Positives Dental/Other procedures (cataract surgery tomorrow), Intentional hold of therapy           OBJECTIVE    INR Protime   Date Value Ref Range Status   06/25/2018 1.2 (A) 0.86 - 1.14 Final       ASSESSMENT / PLAN  INR assessment SUB    Recheck INR In: 1 WEEK    INR Location Clinic      Anticoagulation Summary as of 6/25/2018     INR goal 2.0-3.0   Today's INR 1.2!   Warfarin maintenance plan 5 mg (5 mg x 1) on Mon; 2.5 mg (5 mg x 0.5) all other days   Full warfarin instructions 6/25: Hold; 6/26: 5 mg; 6/27: 5 mg; Otherwise 5 mg on Mon; 2.5 mg all other days   Weekly warfarin total 20 mg   Plan last modified Nicole Chatman RN (3/29/2018)   Next INR check 7/5/2018   Priority INR   Target end date Indefinite    Indications   Long-term (current) use of anticoagulants [Z79.01] [Z79.01]  Chronic atrial fibrillation (H) [I48.2]         Anticoagulation Episode Summary     INR check location     Preferred lab     Send INR reminders to [facility]    Comments       Anticoagulation Care Providers     Provider Role Specialty Phone number    Rose Spencer MD [facility] [phone]            See the Encounter Report to view Anticoagulation Flowsheet and Dosing Calendar (Go to Encounters tab in chart review, and find the Anticoagulation Therapy Visit)    Dosage adjustment made based on physician directed care plan.    Shana Gonzalez RN

## 2018-06-25 NOTE — MR AVS SNAPSHOT
[place] KELVIN Jagdish   6/25/2018 8:00 AM   Anticoagulation Therapy Visit    Description:  82 year old male   Provider:  VANDA ANTI COAG   Department:  [facility]           INR as of 6/25/2018     Today's INR 1.2!      Anticoagulation Summary as of 6/25/2018     INR goal 2.0-3.0   Today's INR 1.2!   Full warfarin instructions 6/25: Hold; 6/26: 5 mg; 6/27: 5 mg; Otherwise 5 mg on Mon; 2.5 mg all other days   Next INR check 7/5/2018    Indications   Long-term (current) use of anticoagulants [Z79.01] [Z79.01]  Chronic atrial fibrillation (H) [I48.2]         Your next Anticoagulation Clinic appointment(s)     Jul 05, 2018  8:15 AM CDT   Anticoagulation Visit with VANDA ANTI COAESTER   [facility] ([address] [phone] [phone]              Contact Numbers     [facility]  Please call [phone] to cancel and/or reschedule your appointment   Please call [phone] with any problems or questions regarding your therapy.        June 2018 Details    Sun Mon Tue Wed Thu Fri Sat          1               2                 3               4               5               6               7               8               9                 10               11               12               13               14               15               16                 17               18               19               20               21               22               23                 24               25      Hold   See details      26      5 mg         27      5 mg         28      2.5 mg         29      2.5 mg         30      2.5 mg          Date Details   06/25 This INR check               How to take your warfarin dose     To take:  2.5 mg Take 0.5 of a 5 mg tablet.    To take:  5 mg Take 1 of the 5 mg tablets.    Hold Do not take your warfarin dose. See the Details table to the right for additional instructions.                July 2018 Details    Sun Mon Tue Wed Thu Fri Sat      1      2.5 mg         2      5 mg         3      2.5 mg         4      2.5 mg         5            6               7                 8               9               10               11               12               13               14                 15               16               17               18               19               20               21                 22               23               24               25               26               27               28                 29               30               31                    Date Details   No additional details    Date of next INR:  7/5/2018         How to take your warfarin dose     To take:  2.5 mg Take 0.5 of a 5 mg tablet.    To take:  5 mg Take 1 of the 5 mg tablets.

## 2018-07-05 ENCOUNTER — ANTICOAGULATION THERAPY VISIT (OUTPATIENT)
Dept: NURSING | Facility: CLINIC | Age: 83
End: 2018-07-05
Payer: COMMERCIAL

## 2018-07-05 DIAGNOSIS — I48.20 CHRONIC ATRIAL FIBRILLATION (H): ICD-10-CM

## 2018-07-05 DIAGNOSIS — Z79.01 LONG-TERM (CURRENT) USE OF ANTICOAGULANTS: ICD-10-CM

## 2018-07-05 LAB — INR POINT OF CARE: 1.8 (ref 0.86–1.14)

## 2018-07-05 PROCEDURE — 36416 COLLJ CAPILLARY BLOOD SPEC: CPT

## 2018-07-05 PROCEDURE — 85610 PROTHROMBIN TIME: CPT | Mod: QW

## 2018-07-05 PROCEDURE — 99207 ZZC NO CHARGE NURSE ONLY: CPT

## 2018-07-05 NOTE — PROGRESS NOTES
ANTICOAGULATION FOLLOW-UP CLINIC VISIT    Patient Name:  Pranay Dubon  Date:  7/5/2018  Contact Type:  Face to Face    SUBJECTIVE:     Patient Findings     Positives Intentional hold of therapy, No Problem Findings           OBJECTIVE    INR Protime   Date Value Ref Range Status   07/05/2018 1.8 (A) 0.86 - 1.14 Final       ASSESSMENT / PLAN  INR assessment SUB    Recheck INR In: 2 WEEKS    INR Location Clinic      Anticoagulation Summary as of 7/5/2018     INR goal 2.0-3.0   Today's INR 1.8!   Warfarin maintenance plan 5 mg (5 mg x 1) on Mon; 2.5 mg (5 mg x 0.5) all other days   Full warfarin instructions 7/5: 5 mg; Otherwise 5 mg on Mon; 2.5 mg all other days   Weekly warfarin total 20 mg   Plan last modified Nicole Chatman RN (3/29/2018)   Next INR check 7/19/2018   Priority INR   Target end date Indefinite    Indications   Long-term (current) use of anticoagulants [Z79.01] [Z79.01]  Chronic atrial fibrillation (H) [I48.2]         Anticoagulation Episode Summary     INR check location     Preferred lab     Send INR reminders to [facility]    Comments       Anticoagulation Care Providers     Provider Role Specialty Phone number    Rose Spencer MD [facility] [phone]            See the Encounter Report to view Anticoagulation Flowsheet and Dosing Calendar (Go to Encounters tab in chart review, and find the Anticoagulation Therapy Visit)    Dosage adjustment made based on physician directed care plan.    Nicole Chatman RN

## 2018-07-05 NOTE — MR AVS SNAPSHOT
Pranay Dubon   7/5/2018 8:15 AM   Anticoagulation Therapy Visit    Description:  82 year old male   Provider:  VANDA ANTI COAG   Department:  [facility]           INR as of 7/5/2018     Today's INR 1.8!      Anticoagulation Summary as of 7/5/2018     INR goal 2.0-3.0   Today's INR 1.8!   Full warfarin instructions 7/5: 5 mg; Otherwise 5 mg on Mon; 2.5 mg all other days   Next INR check 7/19/2018    Indications   Long-term (current) use of anticoagulants [Z79.01] [Z79.01]  Chronic atrial fibrillation (H) [I48.2]         Your next Anticoagulation Clinic appointment(s)     Jul 05, 2018  8:15 AM CDT   Anticoagulation Visit with VANDA ANTI COAG   [facility] ([address] [id number] [phone]            Jul 19, 2018  8:15 AM CDT   Anticoagulation Visit with VANDA ANTI COAG   [facility] ([address] [phone]              Contact Numbers     [facility]  Please call [phone] to cancel and/or reschedule your appointment   Please call [phone] with any problems or questions regarding your therapy.        July 2018 Details    Sun Mon Tue Wed Thu Fri Sat     1               2               3               4               5      5 mg   See details      6      2.5 mg         7      2.5 mg           8      2.5 mg         9      5 mg         10      2.5 mg         11      2.5 mg         12      2.5 mg         13      2.5 mg         14      2.5 mg           15      2.5 mg         16      5 mg         17      2.5 mg         18      2.5 mg         19            20               21                 22               23               24               25               26               27               28                 29               30               31                    Date Details   07/05 This INR check       Date of next INR:  7/19/2018         How to take your warfarin dose      To take:  2.5 mg Take 0.5 of a 5 mg tablet.    To take:  5 mg Take 1 of the 5 mg tablets.

## 2018-07-19 ENCOUNTER — ANTICOAGULATION THERAPY VISIT (OUTPATIENT)
Dept: NURSING | Facility: CLINIC | Age: 83
End: 2018-07-19
Payer: COMMERCIAL

## 2018-07-19 DIAGNOSIS — Z79.01 LONG-TERM (CURRENT) USE OF ANTICOAGULANTS: ICD-10-CM

## 2018-07-19 DIAGNOSIS — I48.20 CHRONIC ATRIAL FIBRILLATION (H): ICD-10-CM

## 2018-07-19 LAB — INR POINT OF CARE: 1.9 (ref 0.86–1.14)

## 2018-07-19 PROCEDURE — 36416 COLLJ CAPILLARY BLOOD SPEC: CPT

## 2018-07-19 PROCEDURE — 99207 ZZC NO CHARGE NURSE ONLY: CPT

## 2018-07-19 PROCEDURE — 85610 PROTHROMBIN TIME: CPT | Mod: QW

## 2018-07-19 NOTE — PROGRESS NOTES
ANTICOAGULATION FOLLOW-UP CLINIC VISIT    Patient Name:  Pranay Dubon  Date:  7/19/2018  Contact Type:  Face to Face    SUBJECTIVE:     Patient Findings     Positives No Problem Findings           OBJECTIVE    INR Protime   Date Value Ref Range Status   07/19/2018 1.9 (A) 0.86 - 1.14 Final       ASSESSMENT / PLAN  INR assessment THER    Recheck INR In: 4 WEEKS    INR Location Clinic      Anticoagulation Summary as of 7/19/2018     INR goal 2.0-3.0   Today's INR 1.9!   Warfarin maintenance plan 5 mg (5 mg x 1) on Mon; 2.5 mg (5 mg x 0.5) all other days   Full warfarin instructions 5 mg on Mon; 2.5 mg all other days   Weekly warfarin total 20 mg   No change documented Nicole Chatman RN   Plan last modified Nicole Chatman RN (3/29/2018)   Next INR check 8/16/2018   Priority INR   Target end date Indefinite    Indications   Long-term (current) use of anticoagulants [Z79.01] [Z79.01]  Chronic atrial fibrillation (H) [I48.2]         Anticoagulation Episode Summary     INR check location     Preferred lab     Send INR reminders to [facility] CL[facility]    Comments       Anticoagulation Care Providers     Provider Role Specialty Phone number    Rose Spencer MD [facility] [phone]            See the Encounter Report to view Anticoagulation Flowsheet and Dosing Calendar (Go to Encounters tab in chart review, and find the Anticoagulation Therapy Visit)    Dosage adjustment made based on physician directed care plan.    Nicole Chatman RN

## 2018-07-19 NOTE — MR AVS SNAPSHOT
Pranay Dubon   7/19/2018 8:15 AM   Anticoagulation Therapy Visit    Description:  82 year old male   Provider:  VANDA ANTI COAG   Department:  [facility]           INR as of 7/19/2018     Today's INR 1.9!      Anticoagulation Summary as of 7/19/2018     INR goal 2.0-3.0   Today's INR 1.9!   Full warfarin instructions 5 mg on Mon; 2.5 mg all other days   Next INR check 8/16/2018    Indications   Long-term (current) use of anticoagulants [Z79.01] [Z79.01]  Chronic atrial fibrillation (H) [I48.2]         Your next Anticoagulation Clinic appointment(s)     Jul 19, 2018  8:15 AM CDT   Anticoagulation Visit with  ANTI COAG   [facility] ([address] [address] [phone]            Aug 16, 2018  8:00 AM CDT   Anticoagulation Visit with VANDA ANTI COAG   [facility] ([address] [address] [phone]              Contact Numbers     [facility]  Please call [phone] to cancel and/or reschedule your appointment   Please call [phone] with any problems or questions regarding your therapy.        July 2018 Details    Sun Mon Tue Wed Thu Fri Sat     1               2               3               4               5               6               7                 8               9               10               11               12               13               14                 15               16               17               18               19      2.5 mg   See details      20      2.5 mg         21      2.5 mg           22      2.5 mg         23      5 mg         24      2.5 mg         25      2.5 mg         26      2.5 mg         27      2.5 mg         28      2.5 mg           29      2.5 mg         30      5 mg         31      2.5 mg              Date Details   07/19 This INR check               How to take your warfarin dose     To take:  2.5 mg Take 0.5 of a 5 mg tablet.     To take:  5 mg Take 1 of the 5 mg tablets.           August 2018 Details    Sun Mon Tue Wed Thu Fri Sat        1      2.5 mg         2      2.5 mg         3      2.5 mg         4      2.5 mg           5      2.5 mg         6      5 mg         7      2.5 mg         8      2.5 mg         9      2.5 mg         10      2.5 mg         11      2.5 mg           12      2.5 mg         13      5 mg         14      2.5 mg         15      2.5 mg         16            17               18                 19               20               21               22               23               24               25                 26               27               28               29               30               31                 Date Details   No additional details    Date of next INR:  8/16/2018         How to take your warfarin dose     To take:  2.5 mg Take 0.5 of a 5 mg tablet.    To take:  5 mg Take 1 of the 5 mg tablets.

## 2018-08-16 ENCOUNTER — ANTICOAGULATION THERAPY VISIT (OUTPATIENT)
Dept: NURSING | Facility: CLINIC | Age: 83
End: 2018-08-16
Payer: COMMERCIAL

## 2018-08-16 DIAGNOSIS — I48.20 CHRONIC ATRIAL FIBRILLATION (H): ICD-10-CM

## 2018-08-16 DIAGNOSIS — Z79.01 LONG-TERM (CURRENT) USE OF ANTICOAGULANTS: ICD-10-CM

## 2018-08-16 LAB — INR POINT OF CARE: 1.8 (ref 0.86–1.14)

## 2018-08-16 PROCEDURE — 99207 ZZC NO CHARGE NURSE ONLY: CPT

## 2018-08-16 PROCEDURE — 36416 COLLJ CAPILLARY BLOOD SPEC: CPT

## 2018-08-16 PROCEDURE — 85610 PROTHROMBIN TIME: CPT | Mod: QW

## 2018-08-16 NOTE — PROGRESS NOTES
ANTICOAGULATION FOLLOW-UP CLINIC VISIT    Patient Name:  Pranay Dubon  Date:  8/16/2018  Contact Type:  Face to Face    SUBJECTIVE:     Patient Findings     Positives Change in diet/appetite, No Problem Findings    Comments Bleeding Signs/Symptoms: NO  Thromboembolic Signs/Symptoms: NO     Medication Changes:  NO  Dietary Changes:  Ate a little more greens this week  Bacterial/Viral Infection: NO     Missed Coumadin Doses: NO  Other Concerns:  NO             OBJECTIVE    INR Protime   Date Value Ref Range Status   08/16/2018 1.8 (A) 0.86 - 1.14 Final       ASSESSMENT / PLAN  INR assessment SUB    Recheck INR In: 4 WEEKS    INR Location Clinic      Anticoagulation Summary as of 8/16/2018     INR goal 2.0-3.0   Today's INR 1.8!   Warfarin maintenance plan 5 mg (5 mg x 1) on Mon; 2.5 mg (5 mg x 0.5) all other days   Full warfarin instructions 5 mg on Mon; 2.5 mg all other days   Weekly warfarin total 20 mg   No change documented Nicole Chatman RN   Plan last modified Nicole Chatman RN (3/29/2018)   Next INR check 9/13/2018   Priority INR   Target end date Indefinite    Indications   Long-term (current) use of anticoagulants [Z79.01] [Z79.01]  Chronic atrial fibrillation (H) [I48.2]         Anticoagulation Episode Summary     INR check location     Preferred lab     Send INR reminders to [facility]    Comments       Anticoagulation Care Providers     Provider Role Specialty Phone number    Rose Spencer MD [facility] [phone]            See the Encounter Report to view Anticoagulation Flowsheet and Dosing Calendar (Go to Encounters tab in chart review, and find the Anticoagulation Therapy Visit)    Dosage adjustment made based on physician directed care plan.    Nicole Chatman RN

## 2018-08-16 NOTE — MR AVS SNAPSHOT
Pranay Dubon   8/16/2018 8:00 AM   Anticoagulation Therapy Visit    Description:  82 year old male   Provider:  VANDA ANTI COAG   Department:  [facility]           INR as of 8/16/2018     Today's INR 1.8!      Anticoagulation Summary as of 8/16/2018     INR goal 2.0-3.0   Today's INR 1.8!   Full warfarin instructions 5 mg on Mon; 2.5 mg all other days   Next INR check 9/13/2018    Indications   Long-term (current) use of anticoagulants [Z79.01] [Z79.01]  Chronic atrial fibrillation (H) [I48.2]         Your next Anticoagulation Clinic appointment(s)     Sep 13, 2018  8:00 AM CDT   Anticoagulation Visit with VANDA ANTI MOHSEN   [facility] ([address] [address] [phone]              Contact Numbers     [facility]  Please call [phone] to cancel and/or reschedule your appointment   Please call [phone] with any problems or questions regarding your therapy.        August 2018 Details    Sun Mon Tue Wed Thu Fri Sat        1               2               3               4                 5               6               7               8               9               10               11                 12               13               14               15               16      2.5 mg   See details      17      2.5 mg         18      2.5 mg           19      2.5 mg         20      5 mg         21      2.5 mg         22      2.5 mg         23      2.5 mg         24      2.5 mg         25      2.5 mg           26      2.5 mg         27      5 mg         28      2.5 mg         29      2.5 mg         30      2.5 mg         31      2.5 mg           Date Details   08/16 This INR check               How to take your warfarin dose     To take:  2.5 mg Take 0.5 of a 5 mg tablet.    To take:  5 mg Take 1 of the 5 mg tablets.           September 2018 Details    Sun Mon Tue Wed Thu Fri Sat           1      2.5 mg           2      2.5 mg         3      5 mg          4      2.5 mg         5      2.5 mg         6      2.5 mg         7      2.5 mg         8      2.5 mg           9      2.5 mg         10      5 mg         11      2.5 mg         12      2.5 mg         13            14               15                 16               17               18               19               20               21               22                 23               24               25               26               27               28               29                 30                      Date Details   No additional details    Date of next INR:  9/13/2018         How to take your warfarin dose     To take:  2.5 mg Take 0.5 of a 5 mg tablet.    To take:  5 mg Take 1 of the 5 mg tablets.

## 2018-09-05 DIAGNOSIS — I10 BENIGN ESSENTIAL HYPERTENSION: ICD-10-CM

## 2018-09-05 DIAGNOSIS — Z79.01 LONG-TERM (CURRENT) USE OF ANTICOAGULANTS: ICD-10-CM

## 2018-09-05 RX ORDER — WARFARIN SODIUM 5 MG/1
TABLET ORAL
Qty: 100 TABLET | Refills: 0 | Status: SHIPPED | OUTPATIENT
Start: 2018-09-05 | End: 2021-03-22

## 2018-09-05 RX ORDER — METOPROLOL TARTRATE 50 MG
TABLET ORAL
Qty: 90 TABLET | Refills: 0 | Status: SHIPPED | OUTPATIENT
Start: 2018-09-05 | End: 2018-11-29

## 2018-09-05 NOTE — TELEPHONE ENCOUNTER
Medication Renewal Request  Received: Yesterday       Pranay Martins       Phone Number: [phone]                     Original authorizing provider: MD Pranay FRY would like a refill of the following medications:   metoprolol (LOPRESSOR) 50 MG tablet [MICHELLE YOUNGBLOOD MD]   warfarin (COUMADIN) 5 MG tablet [MICHELLE YOUNGBLOOD MD]     Preferred pharmacy: [facility] [address] [facility] [address] [address]     Comment:   100 tabs Jantoven (Warfarin)   200 tabs Metoprolol   Payment with CASH not insurance.       Irene Martins,

## 2018-09-05 NOTE — TELEPHONE ENCOUNTER
"Routing refill request to provider for review/approval because:  Patient requesting 200 tablets of Metoprolol which would last him 200 days (takes 1/2 tablet twice daily). Patient due for yearly follow up in October. Please advise if okay to fill per patients request.       Requested Prescriptions   Pending Prescriptions Disp Refills     metoprolol tartrate (LOPRESSOR) 50 MG tablet  Last Written Prescription Date:  8/9/16  Last Fill Quantity: 180,  # refills: 3   Last office visit: 10/16/2017 with prescribing provider:     Future Office Visit:     200 tablet 0     Sig: Take 1/2 tablet twice a day    Beta-Blockers Protocol Passed    9/5/2018  2:55 PM       Passed - Blood pressure under 140/90 in past 12 months    BP Readings from Last 3 Encounters:   10/16/17 122/70   02/01/17 100/63   01/25/17 112/64                Passed - Patient is age 6 or older       Passed - Recent (12 mo) or future (30 days) visit within the authorizing provider's specialty    Patient had office visit in the last 12 months or has a visit in the next 30 days with authorizing provider or within the authorizing provider's specialty.  See \"Patient Info\" tab in inbasket, or \"Choose Columns\" in Meds & Orders section of the refill encounter.          Signed Prescriptions Disp Refills     warfarin (COUMADIN) 5 MG tablet 100 tablet 0     Sig: Take 5 mg on Mon and 2.5 mg all other days OR as directed by INR clinic    There is no refill protocol information for this order        Nicole Chatman RN - BC      "

## 2018-09-05 NOTE — TELEPHONE ENCOUNTER
Warfarin filled per [facility] - INR protocol    Nicole Chatman RN - BC

## 2018-09-05 NOTE — TELEPHONE ENCOUNTER
Patient requesting refills for metoprolol 100mg, and warfarin 5MG different in dose that's within our system.

## 2018-09-05 NOTE — TELEPHONE ENCOUNTER
Signed Prescriptions:                        Disp   Refills    metoprolol tartrate (LOPRESSOR) 50 MG tabl*90 tab*0        Sig: Take 0.5 tablet by mouth twice a day. No further           refills until follow-up appointment  Authorizing Provider: BUBBA CHO    warfarin (COUMADIN) 5 MG tablet            100 ta*0        Sig: Take 5 mg on Mon and 2.5 mg all other days OR as           directed by INR clinic  Authorizing Provider: MICHELLE YOUNGBLOOD  Ordering User: TATI PEARSON

## 2018-09-13 ENCOUNTER — ANTICOAGULATION THERAPY VISIT (OUTPATIENT)
Dept: NURSING | Facility: CLINIC | Age: 83
End: 2018-09-13
Payer: COMMERCIAL

## 2018-09-13 DIAGNOSIS — Z23 NEED FOR PROPHYLACTIC VACCINATION AND INOCULATION AGAINST INFLUENZA: Primary | ICD-10-CM

## 2018-09-13 DIAGNOSIS — I48.20 CHRONIC ATRIAL FIBRILLATION (H): ICD-10-CM

## 2018-09-13 DIAGNOSIS — Z79.01 LONG-TERM (CURRENT) USE OF ANTICOAGULANTS: ICD-10-CM

## 2018-09-13 LAB — INR POINT OF CARE: 2.2 (ref 0.86–1.14)

## 2018-09-13 PROCEDURE — G0008 ADMIN INFLUENZA VIRUS VAC: HCPCS

## 2018-09-13 PROCEDURE — 90662 IIV NO PRSV INCREASED AG IM: CPT

## 2018-09-13 PROCEDURE — 85610 PROTHROMBIN TIME: CPT | Mod: QW

## 2018-09-13 PROCEDURE — 36416 COLLJ CAPILLARY BLOOD SPEC: CPT

## 2018-09-13 PROCEDURE — 99207 ZZC NO CHARGE NURSE ONLY: CPT

## 2018-09-13 NOTE — MR AVS SNAPSHOT
Pranay Dubon   9/13/2018 8:00 AM   Anticoagulation Therapy Visit    Description:  82 year old male   Provider:  VANDA ANTI COAG   Department:  [facility]           INR as of 9/13/2018     Today's INR 2.2      Anticoagulation Summary as of 9/13/2018     INR goal 2.0-3.0   Today's INR 2.2   Full warfarin instructions 2.5 mg every day   Next INR check 10/25/2018    Indications   Long-term (current) use of anticoagulants [Z79.01] [Z79.01]  Chronic atrial fibrillation (H) [I48.2]         Your next Anticoagulation Clinic appointment(s)     Oct 25, 2018  8:00 AM CDT   Anticoagulation Visit with VANDA ANTI MOHSEN   [facility] ([facility]    [address] [facility] [address] [phone]              Contact Numbers     [facility]  Please call [phone] to cancel and/or reschedule your appointment   Please call [phone] with any problems or questions regarding your therapy.        September 2018 Details    Sun Mon Tue Wed Thu Fri Sat           1                 2               3               4               5               6               7               8                 9               10               11               12               13      2.5 mg   See details      14      2.5 mg         15      2.5 mg           16      2.5 mg         17      2.5 mg         18      2.5 mg         19      2.5 mg         20      2.5 mg         21      2.5 mg         22      2.5 mg           23      2.5 mg         24      2.5 mg         25      2.5 mg         26      2.5 mg         27      2.5 mg         28      2.5 mg         29      2.5 mg           30      2.5 mg                Date Details   09/13 This INR check               How to take your warfarin dose     To take:  2.5 mg Take 0.5 of a 5 mg tablet.           October 2018 Details    Sun Mon Tue Wed Thu Fri Sat      1      2.5 mg         2      2.5 mg         3      2.5 mg         4      2.5 mg         5      2.5 mg         6       2.5 mg           7      2.5 mg         8      2.5 mg         9      2.5 mg         10      2.5 mg         11      2.5 mg         12      2.5 mg         13      2.5 mg           14      2.5 mg         15      2.5 mg         16      2.5 mg         17      2.5 mg         18      2.5 mg         19      2.5 mg         20      2.5 mg           21      2.5 mg         22      2.5 mg         23      2.5 mg         24      2.5 mg         25            26               27                 28               29               30               31                   Date Details   No additional details    Date of next INR:  10/25/2018         How to take your warfarin dose     To take:  2.5 mg Take 0.5 of a 5 mg tablet.

## 2018-09-13 NOTE — PROGRESS NOTES
Injectable Influenza Immunization Documentation    1.  Is the person to be vaccinated sick today?   No    2. Does the person to be vaccinated have an allergy to a component   of the vaccine?   No  Egg Allergy Algorithm Link    3. Has the person to be vaccinated ever had a serious reaction   to influenza vaccine in the past?   No    4. Has the person to be vaccinated ever had Guillain-Barré syndrome?   No    Form completed by Nicole Chatman RN - BC               ANTICOAGULATION FOLLOW-UP CLINIC VISIT    Patient Name:  Pranay Dubon  Date:  9/13/2018  Contact Type:  Face to Face    SUBJECTIVE:     Patient Findings     Positives No Problem Findings    Comments Bleeding Signs/Symptoms: NO  Thromboembolic Signs/Symptoms: NO     Medication Changes:  Patient states he has been taking his warfarin 2.5 mg daily, he has not been doing the 5 mg on Monday's  Dietary Changes:  NO  Bacterial/Viral Infection: NO     Missed Coumadin Doses: NO  Other Concerns:  Maintenance dose changed to reflect 2.5 mg daily dosing             OBJECTIVE    INR Protime   Date Value Ref Range Status   09/13/2018 2.2 (A) 0.86 - 1.14 Final       ASSESSMENT / PLAN  INR assessment THER    Recheck INR In: 6 WEEKS    INR Location Clinic      Anticoagulation Summary as of 9/13/2018     INR goal 2.0-3.0   Today's INR 2.2   Warfarin maintenance plan 2.5 mg (5 mg x 0.5) every day   Full warfarin instructions 2.5 mg every day   Weekly warfarin total 17.5 mg   Plan last modified Nicole Chatman, RN (9/13/2018)   Next INR check 10/25/2018   Priority INR   Target end date Indefinite    Indications   Long-term (current) use of anticoagulants [Z79.01] [Z79.01]  Chronic atrial fibrillation (H) [I48.2]         Anticoagulation Episode Summary     INR check location     Preferred lab     Send INR reminders to [facility]    Comments       Anticoagulation Care Providers     Provider Role Specialty Phone number    Rose Spencer MD Responsible Family  Practice [phone]            See the Encounter Report to view Anticoagulation Flowsheet and Dosing Calendar (Go to Encounters tab in chart review, and find the Anticoagulation Therapy Visit)    Dosage adjustment made based on physician directed care plan.    Nicole Chatman RN

## 2018-10-25 ENCOUNTER — ANTICOAGULATION THERAPY VISIT (OUTPATIENT)
Dept: NURSING | Facility: CLINIC | Age: 83
End: 2018-10-25
Payer: COMMERCIAL

## 2018-10-25 DIAGNOSIS — I48.20 CHRONIC ATRIAL FIBRILLATION (H): ICD-10-CM

## 2018-10-25 LAB — INR POINT OF CARE: 1.9 (ref 0.86–1.14)

## 2018-10-25 PROCEDURE — 36416 COLLJ CAPILLARY BLOOD SPEC: CPT

## 2018-10-25 PROCEDURE — 85610 PROTHROMBIN TIME: CPT | Mod: QW

## 2018-10-25 PROCEDURE — 99207 ZZC NO CHARGE NURSE ONLY: CPT

## 2018-10-25 NOTE — PROGRESS NOTES
ANTICOAGULATION FOLLOW-UP CLINIC VISIT    Patient Name:  Pranay Dubon  Date:  10/25/2018  Contact Type:  Face to Face    SUBJECTIVE:     Patient Findings     Positives No Problem Findings    Comments Bleeding Signs/Symptoms: NO  Thromboembolic Signs/Symptoms: NO     Medication Changes:  NO  Dietary Changes:  NO  Bacterial/Viral Infection: NO     Missed Coumadin Doses: NO  Other Concerns:  NO             OBJECTIVE    INR Protime   Date Value Ref Range Status   10/25/2018 1.9 (A) 0.86 - 1.14 Final       ASSESSMENT / PLAN  INR assessment THER    Recheck INR In: 6 WEEKS    INR Location Clinic      Anticoagulation Summary as of 10/25/2018     INR goal 2.0-3.0   Today's INR 1.9!   Warfarin maintenance plan 2.5 mg (5 mg x 0.5) every day   Full warfarin instructions 2.5 mg every day   Weekly warfarin total 17.5 mg   No change documented Nicole Chatman RN   Plan last modified Nicole Chatman RN (9/13/2018)   Next INR check 12/6/2018   Priority INR   Target end date Indefinite    Indications   Long-term (current) use of anticoagulants [Z79.01] [Z79.01]  Chronic atrial fibrillation (H) [I48.2]         Anticoagulation Episode Summary     INR check location     Preferred lab     Send INR reminders to [facility]    Comments       Anticoagulation Care Providers     Provider Role Specialty Phone number    Rose Spencer MD [facility] [phone]            See the Encounter Report to view Anticoagulation Flowsheet and Dosing Calendar (Go to Encounters tab in chart review, and find the Anticoagulation Therapy Visit)    Dosage adjustment made based on physician directed care plan.    Nicole Chatman RN

## 2018-10-25 NOTE — MR AVS SNAPSHOT
Pranay Dubon   10/25/2018 8:00 AM   Anticoagulation Therapy Visit    Description:  82 year old male   Provider:  VANDA ANTI COAG   Department:  [facility]           INR as of 10/25/2018     Today's INR 1.9!      Anticoagulation Summary as of 10/25/2018     INR goal 2.0-3.0   Today's INR 1.9!   Full warfarin instructions 2.5 mg every day   Next INR check 12/6/2018    Indications   Long-term (current) use of anticoagulants [Z79.01] [Z79.01]  Chronic atrial fibrillation (H) [I48.2]         Your next Anticoagulation Clinic appointment(s)     Dec 06, 2018  8:00 AM CST   Anticoagulation Visit with VANDA ANTI MOHSEN   [facility] ([facility]    [address] [address] [phone]              Contact Numbers     [facility]  Please call [phone] to cancel and/or reschedule your appointment   Please call [phone] with any problems or questions regarding your therapy.        October 2018 Details    Sun Mon Tue Wed Thu Fri Sat      1               2               3               4               5               6                 7               8               9               10               11               12               13                 14               15               16               17               18               19               20                 21               22               23               24               25      2.5 mg   See details      26      2.5 mg         27      2.5 mg           28      2.5 mg         29      2.5 mg         30      2.5 mg         31      2.5 mg             Date Details   10/25 This INR check               How to take your warfarin dose     To take:  2.5 mg Take 0.5 of a 5 mg tablet.           November 2018 Details    Sun Mon Tue Wed Thu Fri Sat         1      2.5 mg         2      2.5 mg         3      2.5 mg           4      2.5 mg         5      2.5 mg         6      2.5 mg         7      2.5 mg         8      2.5 mg          9      2.5 mg         10      2.5 mg           11      2.5 mg         12      2.5 mg         13      2.5 mg         14      2.5 mg         15      2.5 mg         16      2.5 mg         17      2.5 mg           18      2.5 mg         19      2.5 mg         20      2.5 mg         21      2.5 mg         22      2.5 mg         23      2.5 mg         24      2.5 mg           25      2.5 mg         26      2.5 mg         27      2.5 mg         28      2.5 mg         29      2.5 mg         30      2.5 mg           Date Details   No additional details            How to take your warfarin dose     To take:  2.5 mg Take 0.5 of a 5 mg tablet.           December 2018 Details    Sun Mon Tue Wed Thu Fri Sat           1      2.5 mg           2      2.5 mg         3      2.5 mg         4      2.5 mg         5      2.5 mg         6            7               8                 9               10               11               12               13               14               15                 16               17               18               19               20               21               22                 23               24               25               26               27               28               29                 30               31                     Date Details   No additional details    Date of next INR:  12/6/2018         How to take your warfarin dose     To take:  2.5 mg Take 0.5 of a 5 mg tablet.

## 2018-10-30 ENCOUNTER — MYC REFILL (OUTPATIENT)
Dept: FAMILY MEDICINE | Facility: CLINIC | Age: 83
End: 2018-10-30

## 2018-10-30 DIAGNOSIS — E78.5 HYPERLIPIDEMIA WITH TARGET LDL LESS THAN 100: ICD-10-CM

## 2018-10-30 RX ORDER — ATORVASTATIN CALCIUM 80 MG/1
80 TABLET, FILM COATED ORAL DAILY
Qty: 90 TABLET | Refills: 3 | Status: SHIPPED | OUTPATIENT
Start: 2018-10-30 | End: 2021-01-19

## 2018-10-30 NOTE — TELEPHONE ENCOUNTER
Message from MyChart:  Original authorizing provider: MD Pranay FRY would like a refill of the following medications:  atorvastatin (LIPITOR) 80 MG tablet [MICHELLE YOUNGBLOOD MD]    Preferred pharmacy: [facility] [address] [facility] [address] [address]    Comment:  100 TABLETS ATORVASTATIN

## 2018-10-30 NOTE — TELEPHONE ENCOUNTER
"Pending Prescriptions:                       Disp   Refills    atorvastatin (LIPITOR) 80 MG tablet       90 tab*3            Sig: Take 1 tablet (80 mg) by mouth daily    Failed [facility] refill protocol, see below:    Requested Prescriptions   Pending Prescriptions Disp Refills     atorvastatin (LIPITOR) 80 MG tablet 90 tablet 3     Sig: Take 1 tablet (80 mg) by mouth daily    Statins Protocol Failed    10/30/2018  3:58 PM       Failed - LDL on file in past 12 months    Recent Labs   Lab Test  01/25/17   0909   LDL  72            Failed - Recent (12 mo) or future (30 days) visit within the authorizing provider's specialty    Patient had office visit in the last 12 months or has a visit in the next 30 days with authorizing provider or within the authorizing provider's specialty.  See \"Patient Info\" tab in inbasket, or \"Choose Columns\" in Meds & Orders section of the refill encounter.             Passed - No abnormal creatine kinase in past 12 months    No lab results found.            Passed - Patient is age 18 or older        Marimar Brandon RN    "

## 2018-11-15 ENCOUNTER — ANTICOAGULATION THERAPY VISIT (OUTPATIENT)
Dept: NURSING | Facility: CLINIC | Age: 83
End: 2018-11-15
Payer: COMMERCIAL

## 2018-11-15 DIAGNOSIS — I48.20 CHRONIC ATRIAL FIBRILLATION (H): ICD-10-CM

## 2018-11-15 LAB — INR POINT OF CARE: 1.2 (ref 0.86–1.14)

## 2018-11-15 PROCEDURE — 99207 ZZC NO CHARGE NURSE ONLY: CPT

## 2018-11-15 PROCEDURE — 85610 PROTHROMBIN TIME: CPT | Mod: QW

## 2018-11-15 PROCEDURE — 36416 COLLJ CAPILLARY BLOOD SPEC: CPT

## 2018-11-15 NOTE — PATIENT INSTRUCTIONS
Some signs and symptoms of clots include: pain or tenderness in arm or leg, swelling in arm or leg, changes in skin color, or area is warm to touch, shortness or breath, trouble breathing.  Numbness or weakness especially on 1 side of the body, sudden trouble speaking or swallowing, sudden trouble seeing, sudden confusion, dizzy spells or headache.  If you have these please call 911 or seek medical care immediately.    
std/sti check

## 2018-11-15 NOTE — PROGRESS NOTES
ANTICOAGULATION FOLLOW-UP CLINIC VISIT    Patient Name:  Pranay Dubon  Date:  11/15/2018  Contact Type:  Face to Face    SUBJECTIVE:     Patient Findings     Positives Intentional hold of therapy, No Problem Findings    Comments Bleeding Signs/Symptoms: NO  Thromboembolic Signs/Symptoms: NO     Medication Changes:  NO  Dietary Changes:  NO  Bacterial/Viral Infection: NO     Missed Coumadin Doses: NO  Other Concerns: [facility]. His Hemoglobin dropped to 6 something and he received 4 units of blood. His hemoglobin was 9 something when discharged on 11/13. He was in the ICU for 3 days.              OBJECTIVE    INR Protime   Date Value Ref Range Status   11/15/2018 1.2 (A) 0.86 - 1.14 Final       ASSESSMENT / PLAN  INR assessment SUB    Recheck INR In: 4 DAYS    INR Location Clinic      Anticoagulation Summary as of 11/15/2018     INR goal 2.0-3.0   Today's INR 1.2!   Warfarin maintenance plan 2.5 mg (5 mg x 0.5) every day   Full warfarin instructions 11/15: 5 mg; 11/16: 5 mg; Otherwise 2.5 mg every day   Weekly warfarin total 17.5 mg   Plan last modified Nicole Chatman, RN (9/13/2018)   Next INR check 11/19/2018   Priority INR   Target end date Indefinite    Indications   Long-term (current) use of anticoagulants [Z79.01] [Z79.01]  Chronic atrial fibrillation (H) [I48.2]         Anticoagulation Episode Summary     INR check location     Preferred lab     Send INR reminders to [facility]    Comments       Anticoagulation Care Providers     Provider Role Specialty Phone number    Rose Spencer MD [facility] [phone]            See the Encounter Report to view Anticoagulation Flowsheet and Dosing Calendar (Go to Encounters tab in chart review, and find the Anticoagulation Therapy Visit)    Dosage adjustment made based on physician directed care plan.    Nicole Chatman, MARY

## 2018-11-15 NOTE — MR AVS SNAPSHOT
[place] KELVIN Dubon   11/15/2018 9:15 AM   Anticoagulation Therapy Visit    Description:  83 year old male   Provider:  VANDA ANTI COAG   Department:  [facility]           INR as of 11/15/2018     Today's INR 1.2!      Anticoagulation Summary as of 11/15/2018     INR goal 2.0-3.0   Today's INR 1.2!   Full warfarin instructions 11/15: 5 mg; 11/16: 5 mg; Otherwise 2.5 mg every day   Next INR check     Indications   Long-term (current) use of anticoagulants [Z79.01] [Z79.01]  Chronic atrial fibrillation (H) [I48.2]         Instructions    Some signs and symptoms of clots include: pain or tenderness in arm or leg, swelling in arm or leg, changes in skin color, or area is warm to touch, shortness or breath, trouble breathing.  Numbness or weakness especially on 1 side of the body, sudden trouble speaking or swallowing, sudden trouble seeing, sudden confusion, dizzy spells or headache.  If you have these please call 911 or seek medical care immediately.           Your next Anticoagulation Clinic appointment(s)     Nov 19, 2018  8:30 AM CST   Anticoagulation Visit with VANDA ANTI COAG   [facility]    [address] [id number] [phone]            Dec 06, 2018  8:00 AM CST   Anticoagulation Visit with VANDA ANTI COAG   [facility]    [address] [id number] [phone]              Contact Numbers     [facility]  Please call [phone] to cancel and/or reschedule your appointment   Please call [phone] with any problems or questions regarding your therapy.        November 2018 Details    Sun Mon Tue Wed Thu Fri Sat         1               2               3                 4               5               6               7               8               9               10                 11               12               13               14               15      5 mg   See details      16      5 mg          17      2.5 mg           18      2.5 mg         19      2.5 mg         20      2.5 mg         21      2.5 mg         22      2.5 mg         23      2.5 mg         24      2.5 mg           25      2.5 mg         26      2.5 mg         27      2.5 mg         28      2.5 mg         29      2.5 mg         30      2.5 mg           Date Details   11/15 This INR check      Date of next INR: No date specified         How to take your warfarin dose     To take:  2.5 mg Take 0.5 of a 5 mg tablet.    To take:  5 mg Take 1 of the 5 mg tablets.

## 2018-11-19 ENCOUNTER — ANTICOAGULATION THERAPY VISIT (OUTPATIENT)
Dept: NURSING | Facility: CLINIC | Age: 83
End: 2018-11-19
Payer: COMMERCIAL

## 2018-11-19 DIAGNOSIS — I48.20 CHRONIC ATRIAL FIBRILLATION (H): ICD-10-CM

## 2018-11-19 LAB — INR POINT OF CARE: 4.6 (ref 0.86–1.14)

## 2018-11-19 PROCEDURE — 36416 COLLJ CAPILLARY BLOOD SPEC: CPT

## 2018-11-19 PROCEDURE — 99207 ZZC NO CHARGE NURSE ONLY: CPT

## 2018-11-19 PROCEDURE — 85610 PROTHROMBIN TIME: CPT | Mod: QW

## 2018-11-19 NOTE — MR AVS SNAPSHOT
[place] KELVIN Jagdish   11/19/2018 8:30 AM   Anticoagulation Therapy Visit    Description:  83 year old male   Provider:  VANDA ANTI COAG   Department:  [facility] Nurse           INR as of 11/19/2018     Today's INR 4.6!      Anticoagulation Summary as of 11/19/2018     INR goal 2.0-3.0   Today's INR 4.6!   Full warfarin instructions 11/19: Hold; Otherwise 2.5 mg every day   Next INR check 11/26/2018    Indications   Long-term (current) use of anticoagulants [Z79.01] [Z79.01]  Chronic atrial fibrillation (H) [I48.2]         Instructions    Some signs and symptoms of bleeding include: Nose bleed or cut that does not stop bleeding in 10 minutes, bleeding of the gums, vomiting (will look like coffee grounds) or coughing up blood, unusual, easy or large areas of bruising, increased or unexpected vaginal bleeding or increased menstrual flow, red or black stools, red or orange urine, prolonged or severe headache, pale skin, unusual or constant tiredness.  If you have these please call 1 or seek medical care immediately.            Your next Anticoagulation Clinic appointment(s)     Nov 26, 2018  8:30 AM CST   Anticoagulation Visit with  ANTI COAG   [facility]    [address] [id number] [phone]            Dec 06, 2018  8:00 AM CST   Anticoagulation Visit with VANDA ANTI COAG   [facility]    [address] [id number] [phone]              Contact Numbers     [facility]  Please call [phone] to cancel and/or reschedule your appointment   Please call [phone] with any problems or questions regarding your therapy.        November 2018 Details    Sun Mon Tue Wed Thu Fri Sat         1               2               3                 4               5               6               7               8               9               10                 11               12               13                14               15               16               17                 18               19      Hold   See details      20      2.5 mg         21      2.5 mg         22      2.5 mg         23      2.5 mg         24      2.5 mg           25      2.5 mg         26            27               28               29               30                 Date Details   11/19 This INR check       Date of next INR:  11/26/2018         How to take your warfarin dose     To take:  2.5 mg Take 0.5 of a 5 mg tablet.    Hold Do not take your warfarin dose. See the Details table to the right for additional instructions.

## 2018-11-19 NOTE — PROGRESS NOTES
ANTICOAGULATION FOLLOW-UP CLINIC VISIT    Patient Name:  Pranay Dubon  Date:  11/19/2018  Contact Type:  Face to Face    SUBJECTIVE:     Patient Findings     Positives Change in medications (Omeprazole), OTC meds (Tylenol)    Comments Bleeding Signs/Symptoms: NO  Thromboembolic Signs/Symptoms: NO     Medication Changes:  YES  Dietary Changes:  NO  Bacterial/Viral Infection: NO     Missed Coumadin Doses: NO  Other Concerns:  Started taking Omeprazole over the weekend. Is also using 2 of the red super Tylenol for aches and pains. Advised pt if he has any signs of bleeding or unexplained bruising, we need to recheck INR sooner than 11/26             OBJECTIVE    INR Protime   Date Value Ref Range Status   11/19/2018 4.6 (A) 0.86 - 1.14 Final       ASSESSMENT / PLAN  INR assessment SUPRA    Recheck INR In: 1 WEEK    INR Location Clinic      Anticoagulation Summary as of 11/19/2018     INR goal 2.0-3.0   Today's INR 4.6!   Warfarin maintenance plan 2.5 mg (5 mg x 0.5) every day   Full warfarin instructions 11/19: Hold; Otherwise 2.5 mg every day   Weekly warfarin total 17.5 mg   Plan last modified Nicole Chatman RN (9/13/2018)   Next INR check 11/26/2018   Priority INR   Target end date Indefinite    Indications   Long-term (current) use of anticoagulants [Z79.01] [Z79.01]  Chronic atrial fibrillation (H) [I48.2]         Anticoagulation Episode Summary     INR check location     Preferred lab     Send INR reminders to [facility]    Comments       Anticoagulation Care Providers     Provider Role Specialty Phone number    Rose Spencer MD [facility] [phone]            See the Encounter Report to view Anticoagulation Flowsheet and Dosing Calendar (Go to Encounters tab in chart review, and find the Anticoagulation Therapy Visit)    Dosage adjustment made based on physician directed care plan.    Shana Gonzalez RN

## 2018-11-20 DIAGNOSIS — D50.9 IRON DEFICIENCY ANEMIA, UNSPECIFIED IRON DEFICIENCY ANEMIA TYPE: ICD-10-CM

## 2018-11-20 LAB
BASOPHILS # BLD AUTO: 0.1 10E9/L (ref 0–0.2)
BASOPHILS NFR BLD AUTO: 0.6 %
DIFFERENTIAL METHOD BLD: ABNORMAL
EOSINOPHIL # BLD AUTO: 0.3 10E9/L (ref 0–0.7)
EOSINOPHIL NFR BLD AUTO: 3.1 %
ERYTHROCYTE [DISTWIDTH] IN BLOOD BY AUTOMATED COUNT: 15.4 % (ref 10–15)
HCT VFR BLD AUTO: 35.8 % (ref 40–53)
HGB BLD-MCNC: 11.5 G/DL (ref 13.3–17.7)
LYMPHOCYTES # BLD AUTO: 1.1 10E9/L (ref 0.8–5.3)
LYMPHOCYTES NFR BLD AUTO: 9.8 %
MCH RBC QN AUTO: 28.8 PG (ref 26.5–33)
MCHC RBC AUTO-ENTMCNC: 32.1 G/DL (ref 31.5–36.5)
MCV RBC AUTO: 90 FL (ref 78–100)
MONOCYTES # BLD AUTO: 1 10E9/L (ref 0–1.3)
MONOCYTES NFR BLD AUTO: 8.8 %
NEUTROPHILS # BLD AUTO: 8.4 10E9/L (ref 1.6–8.3)
NEUTROPHILS NFR BLD AUTO: 77.7 %
PLATELET # BLD AUTO: 392 10E9/L (ref 150–450)
RBC # BLD AUTO: 4 10E12/L (ref 4.4–5.9)
WBC # BLD AUTO: 10.8 10E9/L (ref 4–11)

## 2018-11-20 PROCEDURE — 85025 COMPLETE CBC W/AUTO DIFF WBC: CPT | Performed by: FAMILY MEDICINE

## 2018-11-20 PROCEDURE — 36415 COLL VENOUS BLD VENIPUNCTURE: CPT | Performed by: FAMILY MEDICINE

## 2018-11-26 ENCOUNTER — ANTICOAGULATION THERAPY VISIT (OUTPATIENT)
Dept: NURSING | Facility: CLINIC | Age: 83
End: 2018-11-26
Payer: COMMERCIAL

## 2018-11-26 DIAGNOSIS — I48.20 CHRONIC ATRIAL FIBRILLATION (H): ICD-10-CM

## 2018-11-26 LAB — INR POINT OF CARE: 3.5 (ref 0.86–1.14)

## 2018-11-26 PROCEDURE — 99207 ZZC NO CHARGE NURSE ONLY: CPT

## 2018-11-26 PROCEDURE — 85610 PROTHROMBIN TIME: CPT | Mod: QW

## 2018-11-26 PROCEDURE — 36416 COLLJ CAPILLARY BLOOD SPEC: CPT

## 2018-11-26 NOTE — MR AVS SNAPSHOT
[place] KELVIN Jagdish   11/26/2018 8:30 AM   Anticoagulation Therapy Visit    Description:  83 year old male   Provider:  VANDA ANTI COAG   Department:  [facility]           INR as of 11/26/2018     Today's INR 3.5!      Anticoagulation Summary as of 11/26/2018     INR goal 2.0-3.0   Today's INR 3.5!   Full warfarin instructions 11/26: Hold; Otherwise 2.5 mg every day   Next INR check 12/6/2018    Indications   Long-term (current) use of anticoagulants [Z79.01] [Z79.01]  Chronic atrial fibrillation (H) [I48.2]         Your next Anticoagulation Clinic appointment(s)     Dec 06, 2018  8:00 AM CST   Anticoagulation Visit with VANDA ANTI MOHSEN   [facility] ([facility]    [address] [place] [id number] [phone]              Contact Numbers     [facility]  Please call [phone] to cancel and/or reschedule your appointment   Please call [phone] with any problems or questions regarding your therapy.        November 2018 Details    Sun Mon Tue Wed Thu Fri Sat         1               2               3                 4               5               6               7               8               9               10                 11               12               13               14               15               16               17                 18               19               20               21               22               23               24                 25               26      Hold   See details      27      2.5 mg   See details      28      2.5 mg         29      2.5 mg         30      2.5 mg           Date Details   11/26 This INR check      11/27 1.25mg               How to take your warfarin dose     To take:  2.5 mg Take 0.5 of a 5 mg tablet.    Hold Do not take your warfarin dose. See the Details table to the right for additional instructions.                December 2018 Details    Sun Mon Tue Wed Thu Fri Sat           1      2.5 mg           2      2.5 mg          3      2.5 mg         4      2.5 mg         5      2.5 mg         6            7               8                 9               10               11               12               13               14               15                 16               17               18               19               20               21               22                 23               24               25               26               27               28               29                 30               31                     Date Details   No additional details    Date of next INR:  12/6/2018         How to take your warfarin dose     To take:  2.5 mg Take 0.5 of a 5 mg tablet.

## 2018-11-26 NOTE — PROGRESS NOTES
ANTICOAGULATION FOLLOW-UP CLINIC VISIT    Patient Name:  Pranay Dubon  Date:  11/26/2018  Contact Type:  Face to Face    SUBJECTIVE:     Patient Findings     Positives Change in diet/appetite (increased green intake), No Problem Findings    Comments Offered to send in script for 2.5mg and pt declined stating he wants to quarter the 5mg tabs so he will take 1.25mg tomorrow, then resume his 2.5mg daily. He has a lot of the 5mg left so wants to use up what he has.            OBJECTIVE    INR Protime   Date Value Ref Range Status   11/26/2018 3.5 (A) 0.86 - 1.14 Final       ASSESSMENT / PLAN  INR assessment SUPRA    Recheck INR In: 10 DAYS    INR Location Clinic      Anticoagulation Summary as of 11/26/2018     INR goal 2.0-3.0   Today's INR 3.5!   Warfarin maintenance plan 2.5 mg (5 mg x 0.5) every day   Full warfarin instructions 11/26: Hold; 11/27: 1.25 mg; Otherwise 2.5 mg every day   Weekly warfarin total 17.5 mg   Plan last modified Shana Gonzalez RN (11/26/2018)   Next INR check 12/6/2018   Priority INR   Target end date Indefinite    Indications   Long-term (current) use of anticoagulants [Z79.01] [Z79.01]  Chronic atrial fibrillation (H) [I48.2]         Anticoagulation Episode Summary     INR check location     Preferred lab     Send INR reminders to [facility]    Comments       Anticoagulation Care Providers     Provider Role Specialty Phone number    ShaunRose nicole MD [facility] [phone]            See the Encounter Report to view Anticoagulation Flowsheet and Dosing Calendar (Go to Encounters tab in chart review, and find the Anticoagulation Therapy Visit)    Dosage adjustment made based on physician directed care plan.    Shana Gonzalez RN

## 2018-11-26 NOTE — PROGRESS NOTES
SUBJECTIVE:   Pranay Dubon is a 83 year old male who presents to clinic today for the following health issues:          Hospital Follow-up Visit:    [facility]  Date of Admission: 11/09/2018  Date of Discharge: 11/13/2018  Reason(s) for Admission: AAA            Problems taking medications regularly:  None       Medication changes since discharge: None       Problems adhering to non-medication therapy:  None    Summary of hospitalization:  Discharge summary unavailable  Diagnostic Tests/Treatments reviewed.  Follow up needed: none  Other Healthcare Providers Involved in Patient s Care:         None  Update since discharge: improved.     Post Discharge Medication Reconciliation: discharge medications reconciled, continue medications without change.  Plan of care communicated with patient     Coding guidelines for this visit:  Type of Medical   Decision Making Face-to-Face Visit       within 7 Days of discharge Face-to-Face Visit        within 14 days of discharge   Moderate Complexity [id number]   High Complexity [id number]                   Problem list and histories reviewed & adjusted, as indicated.  Additional history: as documented    Patient Active Problem List   Diagnosis     Advanced directives, counseling/discussion     CAD (coronary artery disease)     Hyperlipidemia with target LDL less than 100     ED (erectile dysfunction)     Obesity     SNHL (sensorineural hearing loss)     Long-term (current) use of anticoagulants [Z79.01]     Chronic atrial fibrillation (H)     Benign essential hypertension     Coronary artery disease involving native heart without angina pectoris, unspecified vessel or lesion type     Posterior vitreous detachment, bilateral     Erectile dysfunction, unspecified erectile dysfunction type     Combined forms of age-related cataract, mod, both eyes     S/P AAA repair     Past Surgical History:   Procedure Laterality Date     ANGIOPLASTY   1992    x 4     OPEN REDUCTION INTERNAL FIXATION ANKLE Right 1978     REPAIR ANEURYSM ABDOMINAL AORTA  11/09/2018    [facility] in [facility]in        Social History   Substance Use Topics     Smoking status: Former Smoker     Packs/day: 1.00     Years: 30.00     Types: Cigarettes     Quit date: 8/13/1983     Smokeless tobacco: Never Used     Alcohol use No     Family History   Problem Relation Age of Onset     Diabetes Mother      Heart Disease Mother      CHF     Cerebrovascular Disease Mother      Macular Degeneration Mother      Diabetes Sister      Cerebrovascular Disease Sister      Heart Disease Brother      CHF     Cerebrovascular Disease Brother      Heart Disease Sister      CHF     Cancer No family hx of      Hypertension No family hx of      Thyroid Disease No family hx of      Glaucoma No family hx of          Current Outpatient Prescriptions   Medication Sig Dispense Refill     aspirin 81 MG tablet Take 81 mg by mouth daily       atorvastatin (LIPITOR) 80 MG tablet Take 1 tablet (80 mg) by mouth daily (Patient taking differently: Take 40 mg by mouth daily ) 90 tablet 3     metoprolol tartrate (LOPRESSOR) 50 MG tablet Take 0.5 tablet by mouth daily. 90 tablet 0     Nitroglycerin (NITROSTAT SL) Place 0.4 mg under the tongue       omeprazole (PRILOSEC) 20 MG DR capsule Take 20 mg by mouth as needed       sildenafil (VIAGRA) 25 MG tablet Take 25 mg by mouth as needed. As needed       warfarin (COUMADIN) 5 MG tablet Take 5 mg on Mon and 2.5 mg all other days OR as directed by INR clinic 100 tablet 0     [DISCONTINUED] metoprolol tartrate (LOPRESSOR) 50 MG tablet Take 0.5 tablet by mouth twice a day. No further refills until follow-up appointment (Patient taking differently: Take 0.5 tablet by mouth daily. No further refills until follow-up appointment) 90 tablet 0     [DISCONTINUED] warfarin (JANTOVEN) 5 MG tablet Take 2.5 mg by mouth daily       No Known Allergies  BP Readings  "from Last 3 Encounters:   11/29/18 134/60   10/16/17 122/70   02/01/17 100/63    Wt Readings from Last 3 Encounters:   11/29/18 168 lb (76.2 kg)   10/16/17 174 lb (78.9 kg)   01/25/17 180 lb (81.6 kg)                  Labs reviewed in EPIC    Reviewed and updated as needed this visit by clinical staff       Reviewed and updated as needed this visit by Provider         ROS:  This 83 year old male is here today in follow-up of [facility]. He had post operative hemorrhage and hemoglobin went to 6.0. He had transfusions and wasn't discharged until 11/13/18. His hematoma around his left groin has improve a lot. Hematoma of his right groin was never as large. He has no problem walking. 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 Then patient will snow blow both driveways. Has no new concerns. All other review of systems are negative  Personal, family, and social history reviewed with patient and revised.         OBJECTIVE:     /60  Pulse 64  Temp 96.7  F (35.9  C) (Oral)  Resp 20  Ht 5' 3.07\" (1.602 m)  Wt 168 lb (76.2 kg)  SpO2 97%  BMI 29.69 kg/m2  Body mass index is 29.69 kg/(m^2).  GENERAL: healthy, alert and no distress  NECK: no adenopathy, no asymmetry, masses, or scars and thyroid normal to palpation  RESP: lungs clear to auscultation - no rales, rhonchi or wheezes  CV: regular rate and rhythm, normal S1 S2, no S3 or S4, no murmur, click or rub, no peripheral edema and peripheral pulses strong  ABDOMEN: soft, nontender, no hepatosplenomegaly, no masses and bowel sounds normal  MS: no gross musculoskeletal defects noted, no edema  Hematoma in left groin is resolving nicely. Still has a lot over his pubic area as well. No signs of infection.   Moves well on exam table  Well hydrated  Well nourished  Well groomed  Alert and oriented X 3  Good spirits  Brisk gait with no shortness of breath     Diagnostic Test " Results:  Results for orders placed or performed in visit on 11/29/18 (from the past 24 hour(s))   CBC with platelets differential   Result Value Ref Range    WBC 9.0 4.0 - 11.0 10e9/L    RBC Count 4.23 (L) 4.4 - 5.9 10e12/L    Hemoglobin 12.3 (L) 13.3 - 17.7 g/dL    Hematocrit 38.4 (L) 40.0 - 53.0 %    MCV 91 78 - 100 fl    MCH 29.1 26.5 - 33.0 pg    MCHC 32.0 31.5 - 36.5 g/dL    RDW 15.5 (H) 10.0 - 15.0 %    Platelet Count 329 150 - 450 10e9/L    % Neutrophils 78.8 %    % Lymphocytes 10.5 %    % Monocytes 6.9 %    % Eosinophils 3.0 %    % Basophils 0.8 %    Absolute Neutrophil 7.1 1.6 - 8.3 10e9/L    Absolute Lymphocytes 0.9 0.8 - 5.3 10e9/L    Absolute Monocytes 0.6 0.0 - 1.3 10e9/L    Absolute Eosinophils 0.3 0.0 - 0.7 10e9/L    Absolute Basophils 0.1 0.0 - 0.2 10e9/L    Diff Method Automated Method        ASSESSMENT/PLAN:              1. S/P AAA repair  Improving nicely. Hemoglobin is almost back to normal   - CBC with platelets differential    2. Benign essential hypertension  Good control   - metoprolol tartrate (LOPRESSOR) 50 MG tablet; Take 0.5 tablet by mouth daily.  Dispense: 90 tablet; Refill: 0  - CBC with platelets differential  - Basic metabolic panel    3. Screening for prostate cancer  due  - PSA, screen    Return to clinic as needed     MICHELLE YOUNGBLOOD MD  [facility]

## 2018-11-29 ENCOUNTER — OFFICE VISIT (OUTPATIENT)
Dept: FAMILY MEDICINE | Facility: CLINIC | Age: 83
End: 2018-11-29
Payer: COMMERCIAL

## 2018-11-29 VITALS
RESPIRATION RATE: 20 BRPM | HEART RATE: 64 BPM | OXYGEN SATURATION: 97 % | DIASTOLIC BLOOD PRESSURE: 60 MMHG | SYSTOLIC BLOOD PRESSURE: 134 MMHG | BODY MASS INDEX: 29.77 KG/M2 | TEMPERATURE: 96.7 F | HEIGHT: 63 IN | WEIGHT: 168 LBS

## 2018-11-29 DIAGNOSIS — Z86.79 S/P AAA REPAIR: Primary | ICD-10-CM

## 2018-11-29 DIAGNOSIS — Z12.5 SCREENING FOR PROSTATE CANCER: ICD-10-CM

## 2018-11-29 DIAGNOSIS — I10 BENIGN ESSENTIAL HYPERTENSION: ICD-10-CM

## 2018-11-29 DIAGNOSIS — Z98.890 S/P AAA REPAIR: Primary | ICD-10-CM

## 2018-11-29 LAB
ANION GAP SERPL CALCULATED.3IONS-SCNC: 5 MMOL/L (ref 3–14)
BASOPHILS # BLD AUTO: 0.1 10E9/L (ref 0–0.2)
BASOPHILS NFR BLD AUTO: 0.8 %
BUN SERPL-MCNC: 19 MG/DL (ref 7–30)
CALCIUM SERPL-MCNC: 9.1 MG/DL (ref 8.5–10.1)
CHLORIDE SERPL-SCNC: 104 MMOL/L (ref 94–109)
CO2 SERPL-SCNC: 29 MMOL/L (ref 20–32)
CREAT SERPL-MCNC: 1.03 MG/DL (ref 0.66–1.25)
DIFFERENTIAL METHOD BLD: ABNORMAL
EOSINOPHIL # BLD AUTO: 0.3 10E9/L (ref 0–0.7)
EOSINOPHIL NFR BLD AUTO: 3 %
ERYTHROCYTE [DISTWIDTH] IN BLOOD BY AUTOMATED COUNT: 15.5 % (ref 10–15)
GFR SERPL CREATININE-BSD FRML MDRD: 69 ML/MIN/1.7M2
GLUCOSE SERPL-MCNC: 93 MG/DL (ref 70–99)
HCT VFR BLD AUTO: 38.4 % (ref 40–53)
HGB BLD-MCNC: 12.3 G/DL (ref 13.3–17.7)
LYMPHOCYTES # BLD AUTO: 0.9 10E9/L (ref 0.8–5.3)
LYMPHOCYTES NFR BLD AUTO: 10.5 %
MCH RBC QN AUTO: 29.1 PG (ref 26.5–33)
MCHC RBC AUTO-ENTMCNC: 32 G/DL (ref 31.5–36.5)
MCV RBC AUTO: 91 FL (ref 78–100)
MONOCYTES # BLD AUTO: 0.6 10E9/L (ref 0–1.3)
MONOCYTES NFR BLD AUTO: 6.9 %
NEUTROPHILS # BLD AUTO: 7.1 10E9/L (ref 1.6–8.3)
NEUTROPHILS NFR BLD AUTO: 78.8 %
PLATELET # BLD AUTO: 329 10E9/L (ref 150–450)
POTASSIUM SERPL-SCNC: 4.2 MMOL/L (ref 3.4–5.3)
PSA SERPL-ACNC: 3.23 UG/L (ref 0–4)
RBC # BLD AUTO: 4.23 10E12/L (ref 4.4–5.9)
SODIUM SERPL-SCNC: 138 MMOL/L (ref 133–144)
WBC # BLD AUTO: 9 10E9/L (ref 4–11)

## 2018-11-29 PROCEDURE — G0103 PSA SCREENING: HCPCS | Performed by: FAMILY MEDICINE

## 2018-11-29 PROCEDURE — 36415 COLL VENOUS BLD VENIPUNCTURE: CPT | Performed by: FAMILY MEDICINE

## 2018-11-29 PROCEDURE — 85025 COMPLETE CBC W/AUTO DIFF WBC: CPT | Performed by: FAMILY MEDICINE

## 2018-11-29 PROCEDURE — 99214 OFFICE O/P EST MOD 30 MIN: CPT | Performed by: FAMILY MEDICINE

## 2018-11-29 PROCEDURE — 80048 BASIC METABOLIC PNL TOTAL CA: CPT | Performed by: FAMILY MEDICINE

## 2018-11-29 RX ORDER — WARFARIN SODIUM 5 MG/1
2.5 TABLET ORAL DAILY
COMMUNITY
End: 2018-11-29 | Stop reason: DRUGHIGH

## 2018-11-29 RX ORDER — METOPROLOL TARTRATE 50 MG
TABLET ORAL
Qty: 90 TABLET | Refills: 0 | Status: SHIPPED | OUTPATIENT
Start: 2018-11-29 | End: 2020-07-07

## 2018-11-29 NOTE — PATIENT INSTRUCTIONS
[facility]    If you have any questions regarding to your visit please contact your care team:       Team Purple:   Clinic Hours Telephone Number   Dr. Rose Chávez   7am-7pm  Monday - Thursday   7am-5pm  Fridays  [phone]  (Appointment scheduling available 24/7)   Urgent Care - [place] and [facility] - 11am-9pm Monday-Friday Saturday-Sunday- 9am-5pm   [place] - 5pm-9pm Monday-Friday Saturday-Sunday- 9am-5pm  [phone] - [place]  [phone] - [place]       What options do I have for a visit other than an office visit? We offer electronic visits (e-visits) and telephone visits, when medically appropriate.  Please check with your medical insurance to see if these types of visits are covered, as you will be responsible for any charges that are not paid by your insurance.      You can use bluepulse (secure electronic communication) to access to your chart, send your primary care provider a message, or make an appointment. Ask a team member how to get started.     For a price quote for your services, please call our Consumer Price Line at [phone] or our Imaging Cost estimation line at [phone] (for imaging tests).

## 2018-11-29 NOTE — LETTER
[place] [id number] [place] [address]    November 29, 2018    Pranay Dubon  [address] [facility] [address]          Dear Pranay,  All of your blood tests are looking good and your hemoglobin is almost back to normal already. Continue your healthy lifestyle. I wish you well.   Enclosed is a copy of your results.     Results for orders placed or performed in visit on 11/29/18   PSA, screen   Result Value Ref Range    PSA 3.23 0 - 4 ug/L   CBC with platelets differential   Result Value Ref Range    WBC 9.0 4.0 - 11.0 10e9/L    RBC Count 4.23 (L) 4.4 - 5.9 10e12/L    Hemoglobin 12.3 (L) 13.3 - 17.7 g/dL    Hematocrit 38.4 (L) 40.0 - 53.0 %    MCV 91 78 - 100 fl    MCH 29.1 26.5 - 33.0 pg    MCHC 32.0 31.5 - 36.5 g/dL    RDW 15.5 (H) 10.0 - 15.0 %    Platelet Count 329 150 - 450 10e9/L    % Neutrophils 78.8 %    % Lymphocytes 10.5 %    % Monocytes 6.9 %    % Eosinophils 3.0 %    % Basophils 0.8 %    Absolute Neutrophil 7.1 1.6 - 8.3 10e9/L    Absolute Lymphocytes 0.9 0.8 - 5.3 10e9/L    Absolute Monocytes 0.6 0.0 - 1.3 10e9/L    Absolute Eosinophils 0.3 0.0 - 0.7 10e9/L    Absolute Basophils 0.1 0.0 - 0.2 10e9/L    Diff Method Automated Method    Basic metabolic panel   Result Value Ref Range    Sodium 138 133 - 144 mmol/L    Potassium 4.2 3.4 - 5.3 mmol/L    Chloride 104 94 - 109 mmol/L    Carbon Dioxide 29 20 - 32 mmol/L    Anion Gap 5 3 - 14 mmol/L    Glucose 93 70 - 99 mg/dL    Urea Nitrogen 19 7 - 30 mg/dL    Creatinine 1.03 0.66 - 1.25 mg/dL    GFR Estimate 69 >60 mL/min/1.7m2    GFR Estimate If Black 84 >60 mL/min/1.7m2    Calcium 9.1 8.5 - 10.1 mg/dL       If you have any questions or concerns, please call myself or my nurse at [phone].      Sincerely,        Rose Spencer MD/daisy

## 2018-11-29 NOTE — MR AVS SNAPSHOT
After Visit Summary   11/29/2018    Pranay Dubon    MRN: [id number]           Patient Information     Date Of Birth          1935        Visit Information        Provider Department      11/29/2018 9:30 AM Rose Spencer MD [facility]        Today's Diagnoses     Screening for prostate cancer    -  1    Benign essential hypertension        S/P AAA repair          Care Instructions    [facility]    If you have any questions regarding to your visit please contact your care team:       Team Purple:   Clinic Hours Telephone Number   Dr. Rose Chávez   7am-7pm  Monday - Thursday   7am-5pm  Fridays  [phone]  (Appointment scheduling available 24/7)   Urgent Care - [place] and [facility] - 11am-9pm Monday-Friday Saturday-Sunday- 9am-5pm   [place] - 5pm-9pm Monday-Friday Saturday-Sunday- 9am-5pm  [phone] - [place]  [phone] - [place]       What options do I have for a visit other than an office visit? We offer electronic visits (e-visits) and telephone visits, when medically appropriate.  Please check with your medical insurance to see if these types of visits are covered, as you will be responsible for any charges that are not paid by your insurance.      You can use [employer] (secure electronic communication) to access to your chart, send your primary care provider a message, or make an appointment. Ask a team member how to get started.     For a price quote for your services, please call our Consumer Price Line at [phone] or our Imaging Cost estimation line at [phone] (for imaging tests).              Follow-ups after your visit        Your next 10 appointments already scheduled     Dec 06, 2018  8:00 AM CST   Anticoagulation Visit with SNEHA ANTI COAG   [facility] [place] ([facility])    [address]  [place] [address] [phone]             "  Who to contact     If you have questions or need follow up information about today's clinic visit or your schedule please contact [facility] directly at [phone].  Normal or non-critical lab and imaging results will be communicated to you by MyChart, letter or phone within 4 business days after the clinic has received the results. If you do not hear from us within 7 days, please contact the clinic through [employer] or phone. If you have a critical or abnormal lab result, we will notify you by phone as soon as possible.  Submit refill requests through [employer] or call your pharmacy and they will forward the refill request to us. Please allow 3 business days for your refill to be completed.          Additional Information About Your Visit        [employer]pping Information     [employer] gives you secure access to your electronic health record. If you see a primary care provider, you can also send messages to your care team and make appointments. If you have questions, please call your primary care clinic.  If you do not have a primary care provider, please call [phone] and they will assist you.        Care EveryWhere ID     This is your Care EveryWhere ID. This could be used by other organizations to access your [place] medical records  [id number]        Your Vitals Were     Pulse Temperature Respirations Height Pulse Oximetry BMI (Body Mass Index)    64 96.7  F (35.9  C) (Oral) 20 5' 3.07\" (1.602 m) 97% 29.69 kg/m2       Blood Pressure from Last 3 Encounters:   11/29/18 134/60   10/16/17 122/70   02/01/17 100/63    Weight from Last 3 Encounters:   11/29/18 168 lb (76.2 kg)   10/16/17 174 lb (78.9 kg)   01/25/17 180 lb (81.6 kg)              We Performed the Following     Basic metabolic panel     CBC with platelets differential     PAF COMPLETED     PSA, screen          Today's Medication Changes          These changes are accurate as of 11/29/18 10:16 AM.  If you have any questions, ask your nurse or " doctor.               These medicines have changed or have updated prescriptions.        Dose/Directions    atorvastatin 80 MG tablet   Commonly known as:  LIPITOR   This may have changed:  how much to take   Used for:  Hyperlipidemia with target LDL less than 100        Dose:  80 mg   Take 1 tablet (80 mg) by mouth daily   Quantity:  90 tablet   Refills:  3       metoprolol tartrate 50 MG tablet   Commonly known as:  LOPRESSOR   This may have changed:  additional instructions   Used for:  Benign essential hypertension   Changed by:  Rose Spencer MD        Take 0.5 tablet by mouth daily.   Quantity:  90 tablet   Refills:  0       warfarin 5 MG tablet   Commonly known as:  COUMADIN   This may have changed:  Another medication with the same name was removed. Continue taking this medication, and follow the directions you see here.   Used for:  Long-term (current) use of anticoagulants   Changed by:  Rose Spencer MD        Take 5 mg on Mon and 2.5 mg all other days OR as directed by INR clinic   Quantity:  100 tablet   Refills:  0            Where to get your medicines      These medications were sent to [id number] - [place], [place] - [address]  [address], [facility] [address]     Phone:  [phone]     metoprolol tartrate 50 MG tablet                Primary Care Provider Office Phone # Fax #    Rose Spencer [phone] [address] [address]        Equal Access to Services     GABE IGLESIAS AH: Hadbeth Coronado, waaxda luqadaha, qaybta kaalmaglenna hays, karine birmingham . So [facility] [phone].    ATENCIÓN: Si habla español, tiene a bowens disposición servicios gratuitos de asistencia lingüística. Bárbara al [phone].    We comply with applicable federal civil rights laws and [place] laws. We do not discriminate on the basis of race, color, national origin, age, disability,  sex, sexual orientation, or gender identity.            Thank you!     Thank you for choosing [facility]  for your care. Our goal is always to provide you with excellent care. Hearing back from our patients is one way we can continue to improve our services. Please take a few minutes to complete the written survey that you may receive in the mail after your visit with us. Thank you!             Your Updated Medication List - Protect others around you: Learn how to safely use, store and throw away your medicines at www.disposemymeds.org.          This list is accurate as of 11/29/18 10:16 AM.  Always use your most recent med list.                   Brand Name Dispense Instructions for use Diagnosis    aspirin 81 MG tablet    ASA     Take 81 mg by mouth daily        atorvastatin 80 MG tablet    LIPITOR    90 tablet    Take 1 tablet (80 mg) by mouth daily    Hyperlipidemia with target LDL less than 100       metoprolol tartrate 50 MG tablet    LOPRESSOR    90 tablet    Take 0.5 tablet by mouth daily.    Benign essential hypertension       NITROSTAT SL      Place 0.4 mg under the tongue        omeprazole 20 MG DR capsule    priLOSEC     Take 20 mg by mouth as needed        VIAGRA 25 MG tablet   Generic drug:  sildenafil      Take 25 mg by mouth as needed. As needed        warfarin 5 MG tablet    COUMADIN    100 tablet    Take 5 mg on Mon and 2.5 mg all other days OR as directed by INR clinic    Long-term (current) use of anticoagulants

## 2018-12-06 ENCOUNTER — ANTICOAGULATION THERAPY VISIT (OUTPATIENT)
Dept: NURSING | Facility: CLINIC | Age: 83
End: 2018-12-06
Payer: COMMERCIAL

## 2018-12-06 DIAGNOSIS — I48.20 CHRONIC ATRIAL FIBRILLATION (H): ICD-10-CM

## 2018-12-06 LAB — INR POINT OF CARE: 2.7 (ref 0.86–1.14)

## 2018-12-06 PROCEDURE — 85610 PROTHROMBIN TIME: CPT | Mod: QW

## 2018-12-06 PROCEDURE — 36416 COLLJ CAPILLARY BLOOD SPEC: CPT

## 2018-12-06 PROCEDURE — 99207 ZZC NO CHARGE NURSE ONLY: CPT

## 2018-12-06 NOTE — PROGRESS NOTES
ANTICOAGULATION FOLLOW-UP CLINIC VISIT    Patient Name:  Pranay Dubon  Date:  12/6/2018  Contact Type:  Face to Face    SUBJECTIVE:     Patient Findings     Positives No Problem Findings           OBJECTIVE    INR Protime   Date Value Ref Range Status   12/06/2018 2.7 (A) 0.86 - 1.14 Final       ASSESSMENT / PLAN  INR assessment THER    Recheck INR In: 3 WEEKS    INR Location Clinic      Anticoagulation Summary as of 12/6/2018     INR goal 2.0-3.0   Today's INR 2.7   Warfarin maintenance plan 2.5 mg (5 mg x 0.5) every day   Full warfarin instructions 2.5 mg every day   Weekly warfarin total 17.5 mg   No change documented Shana Gonzalez RN   Plan last modified Shana Gonzalez RN (11/26/2018)   Next INR check 12/27/2018   Priority INR   Target end date Indefinite    Indications   Long-term (current) use of anticoagulants [Z79.01] [Z79.01]  Chronic atrial fibrillation (H) [I48.2]         Anticoagulation Episode Summary     INR check location     Preferred lab     Send INR reminders to [facility]    Comments       Anticoagulation Care Providers     Provider Role Specialty Phone number    Rose Spencer MD [facility] [phone]            See the Encounter Report to view Anticoagulation Flowsheet and Dosing Calendar (Go to Encounters tab in chart review, and find the Anticoagulation Therapy Visit)    Dosage adjustment made based on physician directed care plan.    Shana Gonzalez RN

## 2018-12-06 NOTE — MR AVS SNAPSHOT
Pranay Dubon   12/6/2018 8:00 AM   Anticoagulation Therapy Visit    Description:  83 year old male   Provider:  VANDA ANTI COAG   Department:  [facility]           INR as of 12/6/2018     Today's INR 2.7      Anticoagulation Summary as of 12/6/2018     INR goal 2.0-3.0   Today's INR 2.7   Full warfarin instructions 2.5 mg every day   Next INR check 12/27/2018    Indications   Long-term (current) use of anticoagulants [Z79.01] [Z79.01]  Chronic atrial fibrillation (H) [I48.2]         Your next Anticoagulation Clinic appointment(s)     Dec 27, 2018  8:00 AM CST   Anticoagulation Visit with VANDA ANTI MOHSEN   [facility] ([facility]    [address] [address] [phone]              Contact Numbers     [facility]  Please call [phone] to cancel and/or reschedule your appointment   Please call [phone] with any problems or questions regarding your therapy.        December 2018 Details    Sun Mon Tue Wed Thu Fri Sat           1                 2               3               4               5               6      2.5 mg   See details      7      2.5 mg         8      2.5 mg           9      2.5 mg         10      2.5 mg         11      2.5 mg         12      2.5 mg         13      2.5 mg         14      2.5 mg         15      2.5 mg           16      2.5 mg         17      2.5 mg         18      2.5 mg         19      2.5 mg         20      2.5 mg         21      2.5 mg         22      2.5 mg           23      2.5 mg         24      2.5 mg         25      2.5 mg         26      2.5 mg         27            28               29                 30               31                     Date Details   12/06 This INR check       Date of next INR:  12/27/2018         How to take your warfarin dose     To take:  2.5 mg Take 0.5 of a 5 mg tablet.

## 2018-12-27 ENCOUNTER — ANTICOAGULATION THERAPY VISIT (OUTPATIENT)
Dept: NURSING | Facility: CLINIC | Age: 83
End: 2018-12-27
Payer: COMMERCIAL

## 2018-12-27 DIAGNOSIS — I48.20 CHRONIC ATRIAL FIBRILLATION (H): ICD-10-CM

## 2018-12-27 LAB — INR POINT OF CARE: 2.5 (ref 0.86–1.14)

## 2018-12-27 PROCEDURE — 99207 ZZC NO CHARGE NURSE ONLY: CPT

## 2018-12-27 PROCEDURE — 85610 PROTHROMBIN TIME: CPT | Mod: QW

## 2018-12-27 PROCEDURE — 36416 COLLJ CAPILLARY BLOOD SPEC: CPT

## 2018-12-27 NOTE — PROGRESS NOTES
ANTICOAGULATION FOLLOW-UP CLINIC VISIT    Patient Name:  Pranay Dubon  Date:  2018  Contact Type:  Face to Face    SUBJECTIVE:     Patient Findings     Positives:   No Problem Findings           OBJECTIVE    INR Protime   Date Value Ref Range Status   2018 2.5 (A) 0.86 - 1.14 Final       ASSESSMENT / PLAN  INR assessment THER    Recheck INR In: 4 WEEKS    INR Location Clinic      Anticoagulation Summary  As of 2018    INR goal:   2.0-3.0   TTR:   48.6 % (2.6 y)   INR used for dosin.5 (2018)   Warfarin maintenance plan:   2.5 mg (5 mg x 0.5) every day   Full warfarin instructions:   2.5 mg every day   Weekly warfarin total:   17.5 mg   No change documented:   Shana Gonzalez RN   Plan last modified:   Shana Gonzalez RN (2018)   Next INR check:   2019   Priority:   INR   Target end date:   Indefinite    Indications    Long-term (current) use of anticoagulants [Z79.01] [Z79.01]  Chronic atrial fibrillation (H) [I48.2]             Anticoagulation Episode Summary     INR check location:       Preferred lab:       Send INR reminders to:   [facility]    Comments:         Anticoagulation Care Providers     Provider Role Specialty Phone number    Rose Spencer MD [facility] [phone]            See the Encounter Report to view Anticoagulation Flowsheet and Dosing Calendar (Go to Encounters tab in chart review, and find the Anticoagulation Therapy Visit)    Dosage adjustment made based on physician directed care plan.    Shana Gonzalez RN

## 2018-12-27 NOTE — PATIENT INSTRUCTIONS
[facility]:  Please call [phone] to cancel and/or reschedule your appointment   Please call [phone] with any problems or questions regarding your Warfarin therapy.

## 2019-01-31 ENCOUNTER — ANTICOAGULATION THERAPY VISIT (OUTPATIENT)
Dept: FAMILY MEDICINE | Facility: CLINIC | Age: 84
End: 2019-01-31

## 2019-01-31 DIAGNOSIS — I48.20 CHRONIC ATRIAL FIBRILLATION (H): ICD-10-CM

## 2019-02-25 ENCOUNTER — TRANSFERRED RECORDS (OUTPATIENT)
Dept: HEALTH INFORMATION MANAGEMENT | Facility: CLINIC | Age: 84
End: 2019-02-25

## 2019-11-07 ENCOUNTER — HEALTH MAINTENANCE LETTER (OUTPATIENT)
Age: 84
End: 2019-11-07

## 2020-02-17 DIAGNOSIS — E78.5 HYPERLIPIDEMIA WITH TARGET LDL LESS THAN 100: ICD-10-CM

## 2020-02-17 RX ORDER — ATORVASTATIN CALCIUM 80 MG/1
80 TABLET, FILM COATED ORAL DAILY
Qty: 90 TABLET | Refills: 3 | OUTPATIENT
Start: 2020-02-17

## 2020-02-17 NOTE — TELEPHONE ENCOUNTER
Spoke to patient and he currently goes to [facility]. He does want to come back to [facility] and will call at later time to set up appointment. Spoke to pharmacy and informed them patient is currently seeing Dr. Karthikeyan Fernandez.  Shana NICHOLAS CMA ([facility])

## 2020-02-23 ENCOUNTER — HEALTH MAINTENANCE LETTER (OUTPATIENT)
Age: 85
End: 2020-02-23

## 2020-06-08 ENCOUNTER — MYC MEDICAL ADVICE (OUTPATIENT)
Dept: FAMILY MEDICINE | Facility: CLINIC | Age: 85
End: 2020-06-08

## 2020-06-08 DIAGNOSIS — R21 RASH AND NONSPECIFIC SKIN ERUPTION: Primary | ICD-10-CM

## 2020-06-09 NOTE — TELEPHONE ENCOUNTER
Please see [employer] message and advise. Would you recommend that pt start with clinic visit for the itching red spots or see derm?

## 2020-06-29 NOTE — TELEPHONE ENCOUNTER
Please see mychart message from patient. Cued cardiology referral for [place] [facility] if recommending this.

## 2020-07-01 RX ORDER — MULTIPLE VITAMINS W/ MINERALS TAB 9MG-400MCG
2 TAB ORAL DAILY
COMMUNITY

## 2020-07-01 RX ORDER — FERROUS SULFATE 325(65) MG
325 TABLET ORAL
COMMUNITY
End: 2021-03-22

## 2020-07-01 RX ORDER — ASPIRIN 325 MG
325 TABLET, DELAYED RELEASE (ENTERIC COATED) ORAL DAILY
COMMUNITY

## 2020-07-01 NOTE — PROGRESS NOTES
"Pranay Dubon is a 84 year old male who is being evaluated via a billable video visit.      The patient has been notified of following:     \"This video visit will be conducted via a call between you and your physician/provider. We have found that certain health care needs can be provided without the need for an in-person physical exam.  This service lets us provide the care you need with a video conversation.  If a prescription is necessary we can send it directly to your pharmacy.  If lab work is needed we can place an order for that and you can then stop by our lab to have the test done at a later time.    Video visits are billed at different rates depending on your insurance coverage.  Please reach out to your insurance provider with any questions.    If during the course of the call the physician/provider feels a video visit is not appropriate, you will not be charged for this service.\"    Patient has given verbal consent for Video visit? Yes  How would you like to obtain your AVS? Wanda  Patient would like the video invitation sent by: Send to e-mail at: [email or web address]  Will anyone else be joining your video visit? No    Subjective     Pranay Dubon is a 84 year old male who presents today via video visit for the following health issues:    [facility]    Hospital Follow-up Visit:    Hospital/Nursing Home/ Rehab Facility: [facility]  Date of Admission: 06/11/2020  Date of Discharge: 06/14/2020  Reason(s) for Admission: Chest pain and low pulse rate      Was your hospitalization related to COVID-19? No   Problems taking medications regularly:  None  Medication changes since discharge: None  Problems adhering to non-medication therapy:  None    Summary of hospitalization:  [facility] discharge summary reviewed  Diagnostic Tests/Treatments reviewed.  Follow up needed: none  Other Healthcare Providers Involved in Patient s Care:         None  Update since discharge: improved.       Post Discharge " Medication Reconciliation: discharge medications reconciled, continue medications without change.  Plan of care communicated with patient               Pranay presents for hospital follow-up visit.    Per chart review, discharge diagnoses include:  Chest pain, indigestion  Medication changes at discharge include:  none  Status since discharge:  improved  Hospital findings that need to be monitored today:  none    BRIEF HOSPITAL COURSE: This 84 y.o. male who presented to [facility] complaining of chest and back pain after a particularly lengthy day of pulling weeds in his garden. Please see the H&P for full details. The patient was found to be dehydrated. CT chest was negative for acute pathology including no evidence of dissection. However he did have small pulmonary nodule. Therefore a CT abdomen/pelvis was obtained which was negative for evidence of malignancy though was concerning for cholecystitis versus CBD obstruction. He had a leukocytosis to 25K on admission but afebrile. On HOD#1 his white count yaakov to 32K though his chest and back pain resolved. Serial Troponin were negative. The differential was negative for blasts but did showed 95% neutrophils. Urinalysis was negative. No RUQ abdominal pain. RUQ US was also concerning for the same, though the study was not optimal due to bowel gas. I spoke with Gen surgery as the patient was asymptomatic though imaging was suggesting gall bladder pathology. Gen surgery recommended a HIDA scan which was negative and showed patent ducts. His white count did ultimately improve though. The patient complained of left side abdominal pain which on HOD#3 for which an x-ray was obtained showing increased bowel gas though no evidence of obstruction or free air. Lactate was negative. The patient reports he has similar pain on occasion at home. The patient was discharged to home in improved condition. Follow up with primary physician in 3-4 days. White count will need to be  followed, I ordered the lab and asked him to have it drawn before his next appointment in 3-4 days. Exam was stable on the day of discharge. I gave daily updates to his daughter Shana.     Since discharge, WBC has come down to wnl  Needs to establish with cardiology given history of AAA and A-fib      Reviewed and updated as needed this visit by Provider  Tobacco  Allergies  Meds  Problems  Med Hx  Surg Hx  Fam Hx         Review of Systems   Constitutional, HEENT, cardiovascular, pulmonary, gi and gu systems are negative, except as otherwise noted.      Objective             Physical Exam     GENERAL: Healthy, alert and no distress  EYES: Eyes grossly normal to inspection.  No discharge or erythema, or obvious scleral/conjunctival abnormalities.  RESP: No audible wheeze, cough, or visible cyanosis.  No visible retractions or increased work of breathing.    SKIN: Visible skin clear. No significant rash, abnormal pigmentation or lesions.  NEURO: Cranial nerves grossly intact.  Mentation and speech appropriate for age.  PSYCH: Mentation appears normal, affect normal/bright, judgement and insight intact, normal speech and appearance well-groomed.              Assessment & Plan       ICD-10-CM    1. Hospital discharge follow-up  Z09    2. Encounter for medical examination to establish care  Z00.00    3. Abdominal aortic aneurysm (AAA) without rupture (H)  I71.4 CARDIOLOGY EVAL ADULT REFERRAL     CANCELED: CARDIOLOGY EVAL ADULT REFERRAL   4. Atrial fibrillation, unspecified type (H)  I48.91 CARDIOLOGY EVAL ADULT REFERRAL   5. Atypical chest pain  R07.89 CARDIOLOGY EVAL ADULT REFERRAL          Referral placed to cardiology    Return in about 2 weeks (around 7/16/2020) for With Specialist.    Ramone Chávez MD  [facility]      Video-Visit Details    Type of service:  Video Visit    Video Length 53 mins    Originating Location (pt. Location): Home    Distant Location (provider location):  [place]  [facility]     Platform used for Video Visit: AmWell    Return in about 2 weeks (around 7/16/2020) for With Specialist.       Ramone Chávez MD

## 2020-07-02 ENCOUNTER — VIRTUAL VISIT (OUTPATIENT)
Dept: FAMILY MEDICINE | Facility: CLINIC | Age: 85
End: 2020-07-02
Payer: COMMERCIAL

## 2020-07-02 ENCOUNTER — MYC MEDICAL ADVICE (OUTPATIENT)
Dept: FAMILY MEDICINE | Facility: CLINIC | Age: 85
End: 2020-07-02

## 2020-07-02 DIAGNOSIS — Z09 HOSPITAL DISCHARGE FOLLOW-UP: Primary | ICD-10-CM

## 2020-07-02 DIAGNOSIS — Z00.00 ENCOUNTER FOR MEDICAL EXAMINATION TO ESTABLISH CARE: ICD-10-CM

## 2020-07-02 DIAGNOSIS — I71.40 ABDOMINAL AORTIC ANEURYSM (AAA) WITHOUT RUPTURE (H): ICD-10-CM

## 2020-07-02 DIAGNOSIS — I48.91 ATRIAL FIBRILLATION, UNSPECIFIED TYPE (H): ICD-10-CM

## 2020-07-02 DIAGNOSIS — R07.89 ATYPICAL CHEST PAIN: ICD-10-CM

## 2020-07-02 PROCEDURE — 99214 OFFICE O/P EST MOD 30 MIN: CPT | Mod: 95 | Performed by: FAMILY MEDICINE

## 2020-07-03 NOTE — TELEPHONE ENCOUNTER
RECORDS RECEIVED FROM: internal/CE    DATE RECEIVED:7.6.20    NOTES STATUS DETAILS   OFFICE NOTE from referring provider    Internal  Dr. Ramone Lazaro    OFFICE NOTE from other cardiologist    CE  [facility]- Angel Thakur   DISCHARGE SUMMARY from hospital    CE [facility]- 6/11/2- Venkatesh WIGGINS,    DISCHARGE REPORT from the ER   na    OPERATIVE REPORT    na    MEDICATION LIST   Internal     LABS     BMP   ce 6.14.10   CBC   ce 1.7.19   CMP   ce 1.1.19   Lipids   ce 1.25.19   TSH   na    DIAGNOSTIC PROCEDURES     EKG   ce [facility]- 6.13.20, 6.12.20, 2.18.19, more in CE    Monitor Reports       IMAGING (DISC & REPORT)      Echo   ce [facility]- 1.12.19,    Stress Tests   na    Cath   na    MRI/MRA   na    CT/CTA   ce [facility]- 6.11.20       Action 7.3.20 sv    Action Taken Records/image sent to [facility] for  EKG - 6.13.20, 6.12.20, 2.18.19,  4 most recent   Echo- 1.12.19  CT- 6.11.20     -- received EKG strips, sent to scanning

## 2020-07-06 ENCOUNTER — PRE VISIT (OUTPATIENT)
Dept: CARDIOLOGY | Facility: CLINIC | Age: 85
End: 2020-07-06

## 2020-07-06 ENCOUNTER — VIRTUAL VISIT (OUTPATIENT)
Dept: CARDIOLOGY | Facility: CLINIC | Age: 85
End: 2020-07-06
Attending: INTERNAL MEDICINE
Payer: COMMERCIAL

## 2020-07-06 DIAGNOSIS — Z98.890 HISTORY OF AAA (ABDOMINAL AORTIC ANEURYSM) REPAIR: ICD-10-CM

## 2020-07-06 DIAGNOSIS — I10 BENIGN ESSENTIAL HYPERTENSION: ICD-10-CM

## 2020-07-06 DIAGNOSIS — I71.40 ABDOMINAL AORTIC ANEURYSM (AAA) WITHOUT RUPTURE (H): ICD-10-CM

## 2020-07-06 DIAGNOSIS — I25.10 CORONARY ARTERY DISEASE INVOLVING NATIVE CORONARY ARTERY OF NATIVE HEART WITHOUT ANGINA PECTORIS: Primary | ICD-10-CM

## 2020-07-06 DIAGNOSIS — I48.0 PAROXYSMAL ATRIAL FIBRILLATION (H): ICD-10-CM

## 2020-07-06 PROCEDURE — 99203 OFFICE O/P NEW LOW 30 MIN: CPT | Mod: 95 | Performed by: INTERNAL MEDICINE

## 2020-07-06 ASSESSMENT — PAIN SCALES - GENERAL: PAINLEVEL: NO PAIN (0)

## 2020-07-06 NOTE — LETTER
7/6/2020    RE: Pranay Dubon  [address]  [facility] [address]       Dear Colleague,    Thank you for the opportunity to participate in the care of your patient, Pranay Dubon, at the [facility]. Please see a copy of my visit note below.    Pranay Dubon is a 84 year old male who is being evaluated via a billable video visit.        Video Start Time: 1: 00 pm  Video-Visit Details  Type of service:  Video Visit  Video End Time (time video stopped): 1:30 pm  Originating Location (pt. Location): Home  Distant Location (provider location):  [facility]   Mode of Communication:  Video Conference via [employer]    HPI: Mr. Pranay Dubon is a 84 year old  male with PMH significant for chronic iron deficiency anemia, heart failure with preserved ejection fraction, hypertension, paroxysmal atrial fibrillation on aspirin only, AAA repair in 11/2018 and CAD S/P PCI in 1985×4 (no coronary angiogram since then), and small bowel obstruction requiring abdominal surgery.    Mr. Lindsey is a new patient to me.  All his prior cardiac care was at [facility].  Patient was last seen in cardiology clinic at [facility] a year ago.  He is establishing care with us today.  He has recently seen Dr. Lazaro and he is referred to cardiology due to his history of AAA surgery and paroxysmal atrial fibrillation.      Patient was recently admitted to [facility] on 6/11/2020 for epigastric and chest discomfort.  Patient's symptoms apparently started after working in his backyard.  He stayed at the hospital for 3 days.  He underwent thorough evaluation including CT of the chest which showed no evidence of dissection. CT of the abdomen pelvis was obtained which was negative for evidence of malignancy though was concerning for cholecystitis.  Patient was found to have significant leukocytosis on admission.  On hospital day 1 his white count yaakov to 32K.  He  reported resolution of chest and back pain.  Serial troponins were negative.  Patient underwent HIDA scan which showed patent bile ducts. He was deemed to be dehydrated.  No medication change was recommended.      Since discharge the patient reports having balance issues.  When he tries to move fast he feels like he is losing balance.  Denies chest pain, syncope, lower extremity edema.  He tells me that he cannot gain weight.  His appetite is good.  Patient also tells me that he has chronic diarrhea since small bowel surgery in January 2019.  He tells me that he is almost semi-continent.  He checks his pulse and finds his pulse around 50s.  Blood pressure 100/59 mmHg.  Patient again tells me that he has never felt atrial fibrillation.  He is aware of only one episode of A. fib when [facility].  He denies recurrence since then.  Patient's EKG shows sinus rhythm during his most recent admission as well.  Reviewing patient's prior cardiology reports I have seen that he was noted to have recurrent epistaxis when he was on warfarin. Eventually he was taken off of warfarin due to GI bleeding of unclear source.  His hemoglobin was as low as 6 at some point.  He is maintaining stable hemoglobin now iron replacement.    [facility].    Patient has 30-pack-year history of smoking.  Quit date 1983.  Patient was admitted to [facility] December 2019 with small bowel obstruction complicated by septic shock from bacteremia.  He underwent exploratory laparotomy with lysis of adhesions.  His hospital course was complicated by SHANTEL requiring temporary CRRT.    Echocardiogram a year ago ([facility]) showed normal biventricular function with grade 1 LV diastolic dysfunction.  No significant valve disease was noted.     Medications, personal, family, and social history reviewed with patient and revised.    PAST  MEDICAL HISTORY:  Past Medical History:   Diagnosis Date     Atrial fibrillation (H)      BPH (benign prostatic hyperplasia)      CAD (coronary artery disease)     4 stents     ED (erectile dysfunction)      GERD (gastroesophageal reflux disease)      Hypercholesterolemia      Hypertension goal BP (blood pressure) < 140/90      Lumbar stenosis      Microscopic hematuria     normal urologic evaluation      Nonsenile cataract      Obesity      SNHL (sensorineural hearing loss)      Type 2 diabetes mellitus without complications (H)        CURRENT MEDICATIONS:  Current Outpatient Medications   Medication Sig Dispense Refill     aspirin (ASA) 325 MG EC tablet Take 325 mg by mouth daily       atorvastatin (LIPITOR) 80 MG tablet Take 1 tablet (80 mg) by mouth daily (Patient taking differently: Take 40 mg by mouth daily ) 90 tablet 3     Cholecalciferol (VITAMIN D3) 25 MCG (1000 UT) CAPS Take 1,000 Units by mouth daily       ferrous sulfate (FEROSUL) 325 (65 Fe) MG tablet Take 325 mg by mouth daily (with breakfast)       metoprolol tartrate (LOPRESSOR) 50 MG tablet Take 0.5 tablet by mouth daily. (Patient taking differently: Take 0.5 tablet by mouth twice daily.) 90 tablet 0     multivitamin w/minerals (MULTI-VITAMIN) tablet Take 2 tablets by mouth daily        Nitroglycerin (NITROSTAT SL) Place 0.4 mg under the tongue       omeprazole (PRILOSEC) 20 MG DR capsule Take 20 mg by mouth as needed       sildenafil (VIAGRA) 25 MG tablet Take 25 mg by mouth as needed. As needed       warfarin (COUMADIN) 5 MG tablet Take 5 mg on Mon and 2.5 mg all other days OR as directed by INR clinic (Patient not taking: Reported on 7/1/2020) 100 tablet 0       PAST SURGICAL HISTORY:  Past Surgical History:   Procedure Laterality Date     ANGIOPLASTY  1992    x 4     OPEN REDUCTION INTERNAL FIXATION ANKLE Right 1978     REPAIR ANEURYSM ABDOMINAL AORTA  11/09/2018    [facility] in [facility]        ALLERGIES:   No  Known Allergies    FAMILY HISTORY:  Family History   Problem Relation Age of Onset     Diabetes Mother      Heart Disease Mother         CHF     Cerebrovascular Disease Mother      Macular Degeneration Mother      Diabetes Sister      Cerebrovascular Disease Sister      Heart Disease Brother         CHF     Cerebrovascular Disease Brother      Heart Disease Sister         CHF     Cancer No family hx of      Hypertension No family hx of      Thyroid Disease No family hx of      Glaucoma No family hx of          SOCIAL HISTORY:  Social History     Tobacco Use     Smoking status: Former Smoker     Packs/day: 1.00     Years: 30.00     Pack years: 30.00     Types: Cigarettes     Last attempt to quit: 1983     Years since quittin.9     Smokeless tobacco: Never Used   Substance Use Topics     Alcohol use: No     Alcohol/week: 0.0 standard drinks     Drug use: No       ROS:   Constitutional: No fever, chills, or sweats. Weight stable.   ENT: No visual disturbance, ear ache, epistaxis, sore throat.   Cardiovascular: As per HPI.   Respiratory: No cough, hemoptysis.    GI: No nausea, vomiting, hematemesis, melena, or hematochezia.   : No hematuria.   Integument: Negative.   Psychiatric: Negative.   Hematologic:  No easy bruising, no easy bleeding.  Neuro: Negative.   Endocrinology: No significant heat or cold intolerance   Musculoskeletal: No myalgia.    Exam:  Physical Exam Elements attainable via telehealth:       Constitutional - alert and no distress    Eyes - no redness, no discharge    Respiratory - no cough, no labored breathing    Skin - no discoloration or lesions on the face or the arm.    Neurological -alert, normal speech,and affect, no tremor.     I have reviewed the labs and personally reviewed the imaging below and made my comment in the assessment and plan.    Labs:  CBC RESULTS:   Lab Results   Component Value Date    WBC 9.0 2018    RBC 4.23 (L) 2018    HGB 12.3 (L) 2018    HCT  38.4 (L) 11/29/2018    MCV 91 11/29/2018    MCH 29.1 11/29/2018    MCHC 32.0 11/29/2018    RDW 15.5 (H) 11/29/2018     11/29/2018       BMP RESULTS:  Lab Results   Component Value Date     11/29/2018    POTASSIUM 4.2 11/29/2018    CHLORIDE 104 11/29/2018    CO2 29 11/29/2018    ANIONGAP 5 11/29/2018    GLC 93 11/29/2018    BUN 19 11/29/2018    CR 1.03 11/29/2018    GFRESTIMATED 69 11/29/2018    GFRESTBLACK 84 11/29/2018    YUNIOR 9.1 11/29/2018        INR RESULTS:  Lab Results   Component Value Date    INR 2.5 (A) 12/27/2018    INR 2.7 (A) 12/06/2018    INR 3.5 (A) 11/26/2018    INR 4.6 (A) 11/19/2018    INR 2.10 (H) 01/26/2017    INR 2.20 (H) 02/08/2016    INR 2.10 (H) 12/23/2015     Chest CT 6/11/2020 [facility]:     1.  New irregular 22 x 8 mm subpleural right lower lobe pulmonary nodule with  adjacent smaller nodularity. This is suspicious for pulmonary malignancy  although the possibility of infection is not excluded. This is atypical for  COVID 19 pneumonia. See follow-up recommendation below.  2.  Stable groundglass nodule in the left lower lobe.    Echocardiogram 1/13/2019:   1. Mildly enlarged left atrium.   2. Grade 1 pattern of LV diastolic filling.   3. Normal left ventricular size, borderline wall thickness, normal global systolic function, calculated EF of 68 %.   4. Right ventricular cavity size is normal, global systolic RV function is normal.   5. The aortic valve is normal and trileaflet, no stenosis and mild regurgitation.   6. The mitral valve is sclerotic, mild mitral regurgitation.   7. 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   8. No pericardial effusion    EKG 6/12/2020 [facility] sinus rhythm with PACs.    Assessment and Plan:   Mr. Pranay Dubon is a 84 year old  male with PMH significant for chronic iron deficiency anemia, heart failure with preserved ejection fraction currently stable requiring no diuretics, hypertension, paroxysmal atrial  fibrillation on aspirin only, AAA repair in 11/2018 and CAD S/P PCI in 1985×4 (no coronary angiogram since then), and small bowel obstruction requiring abdominal surgery.    Patient appears to have multiple noncardiac issues.  Luckily he is stable from cardiac standpoint.  During his last hospital admission he was in sinus rhythm.  His only concern is feeling off balance and sinus bradycardia.  He is currently on metoprolol 25 mg twice daily.  He is wondering if he can lower the dose and increase his pulse rate is a little bit which might help his dizziness when he is upright and walking.  I think that is a good idea.  I recommended patient to take only 1 tablet and lower the dose to 25 mg daily.    He also was wondering today how long he needs to be on iron treatment.  He also mentioned about his semi-incontinence and chronic diarrhea.  I strongly recommended patient to follow-up on these with his primary care physician.    [facility].    Paroxysmal atrial fibrillation does not appear to be a significant problem.  So far patient reports that he was only diagnosed with A. fib when he had his surgery for AAA while he was at the [facility].  I have reviewed patient's all EKGs from care everywhere.  I have not seen any EKG showing A. fib.  I think he does not need to be on anticoagulation.  Recommended to continue with aspirin.  Of note patient has history of chronic iron loss anemia with hemoglobin dropping to 6 g/dL in the past.  He has been maintaining stable hemoglobin with iron replacement.      I have also noticed that the radiologist from [facility] on the CT scan dated 6/11/2020 reported a new irregular 22 mm subpleural right lower lobe pulmonary nodule suspicious for malignancy.  I will inform patient's PCP about the findings to see if he would like to follow-up on this or refer to pulmonary for follow-up.  The patient today tells me that he  cannot gain weight despite eating.  I am not sure if this symptom is due to underlying malignancy versus chronic diarrhea that he has (I have noticed this result after my clinic visit.  I am not sure if the patient is aware of this result).  There is no note about this finding in the discharge summary.      Medication change today: Decrease the metoprolol dose to 25 mg daily.  Continue aspirin, atorvastatin, omeprazole and iron supplementation.    Return to clinic 1 year.    A total of 30 minutes spent face-to-face through video encounter today with greater than 50% of the time spent in counseling and coordinating cares of the issues above.   I have spent an additional 60 minutes reviewing patient's old his prior medical records from care everywhere.    Please donot hesitate to contact me if you have any questions or concerns. Again, thank you for allowing me to participate in the care of your patient.    Ricardo GROSSMAN MD  [facility] Division of Cardiology  Pager [phone]     Medications were reconciled.   Bella Benítez CMA  12:50 PM    Please do not hesitate to contact me if you have any questions/concerns.     Sincerely,     Ricardo Grossman MD

## 2020-07-06 NOTE — PROGRESS NOTES
"Pranay Dubon is a 84 year old male who is being evaluated via a billable video visit.      The patient has been notified of following:     \"This video visit will be conducted via a call between you and your physician/provider. We have found that certain health care needs can be provided without the need for an in-person physical exam.  This service lets us provide the care you need with a video conversation.  If a prescription is necessary we can send it directly to your pharmacy.  If lab work is needed we can place an order for that and you can then stop by our lab to have the test done at a later time.    Video visits are billed at different rates depending on your insurance coverage.  Please reach out to your insurance provider with any questions.    If during the course of the call the physician/provider feels a video visit is not appropriate, you will not be charged for this service.\"    Patient has given verbal consent for Video visit? Yes    How would you like to obtain your AVS? Wanda    Patient would like the video invitation sent by: Send to e-mail at: [email or web address]      Video Start Time: 1: 00 pm    Video-Visit Details    Type of service:  Video Visit    Video End Time (time video stopped): 1:30 pm    Originating Location (pt. Location): Home    Distant Location (provider location):  [facility]     Mode of Communication:  Video Conference via [employer]    HPI: Mr. Pranay Dubon is a 84 year old  male with PMH significant for chronic iron deficiency anemia, heart failure with preserved ejection fraction, hypertension, paroxysmal atrial fibrillation on aspirin only, AAA repair in 11/2018 and CAD S/P PCI in 1985×4 (no coronary angiogram since then), and small bowel obstruction requiring abdominal surgery.    Mr. Lindsey is a new patient to me.  All his prior cardiac care was at [facility].  Patient was last seen in cardiology clinic at [facility] a year ago.  He is establishing " care with us today.  He has recently seen Dr. Lazaro and he is referred to cardiology due to his history of AAA surgery and paroxysmal atrial fibrillation.      Patient was recently admitted to [facility] on 6/11/2020 for epigastric and chest discomfort.  Patient's symptoms apparently started after working in his backyard.  He stayed at the hospital for 3 days.  He underwent thorough evaluation including CT of the chest which showed no evidence of dissection. CT of the abdomen pelvis was obtained which was negative for evidence of malignancy though was concerning for cholecystitis.  Patient was found to have significant leukocytosis on admission.  On hospital day 1 his white count yaakov to 32K.  He reported resolution of chest and back pain.  Serial troponins were negative.  Patient underwent HIDA scan which showed patent bile ducts. He was deemed to be dehydrated.  No medication change was recommended.      Since discharge the patient reports having balance issues.  When he tries to move fast he feels like he is losing balance.  Denies chest pain, syncope, lower extremity edema.  He tells me that he cannot gain weight.  His appetite is good.  Patient also tells me that he has chronic diarrhea since small bowel surgery in January 2019.  He tells me that he is almost semi-continent.  He checks his pulse and finds his pulse around 50s.  Blood pressure 100/59 mmHg.  Patient again tells me that he has never felt atrial fibrillation.  He is aware of only one episode of A. fib when he underwent AAA surgery in 2018 ([facility]) while he was in the hospital.  He denies recurrence since then.  Patient's EKG shows sinus rhythm during his most recent admission as well.  Reviewing patient's prior cardiology reports I have seen that he was noted to have recurrent epistaxis when he was on warfarin. Eventually he was taken off of warfarin due to GI bleeding of unclear source.  His hemoglobin was as low as 6 at some point.   He is maintaining stable hemoglobin now iron replacement.    [facility].    Patient has 30-pack-year history of smoking.  Quit date 1983.  Patient was admitted to [facility] December 2019 with small bowel obstruction complicated by septic shock from bacteremia.  He underwent exploratory laparotomy with lysis of adhesions.  His hospital course was complicated by SHANTEL requiring temporary CRRT.    Echocardiogram a year ago ([facility]) showed normal biventricular function with grade 1 LV diastolic dysfunction.  No significant valve disease was noted.     Medications, personal, family, and social history reviewed with patient and revised.    PAST MEDICAL HISTORY:  Past Medical History:   Diagnosis Date     Atrial fibrillation (H)      BPH (benign prostatic hyperplasia)      CAD (coronary artery disease)     4 stents     ED (erectile dysfunction)      GERD (gastroesophageal reflux disease)      Hypercholesterolemia      Hypertension goal BP (blood pressure) < 140/90      Lumbar stenosis      Microscopic hematuria     normal urologic evaluation      Nonsenile cataract      Obesity      SNHL (sensorineural hearing loss)      Type 2 diabetes mellitus without complications (H)        CURRENT MEDICATIONS:  Current Outpatient Medications   Medication Sig Dispense Refill     aspirin (ASA) 325 MG EC tablet Take 325 mg by mouth daily       atorvastatin (LIPITOR) 80 MG tablet Take 1 tablet (80 mg) by mouth daily (Patient taking differently: Take 40 mg by mouth daily ) 90 tablet 3     Cholecalciferol (VITAMIN D3) 25 MCG (1000 UT) CAPS Take 1,000 Units by mouth daily       ferrous sulfate (FEROSUL) 325 (65 Fe) MG tablet Take 325 mg by mouth daily (with breakfast)       metoprolol tartrate (LOPRESSOR) 50 MG tablet Take 0.5 tablet by mouth daily. (Patient taking differently: Take 0.5 tablet by mouth twice daily.) 90 tablet 0     multivitamin w/minerals (MULTI-VITAMIN) tablet  Take 2 tablets by mouth daily        Nitroglycerin (NITROSTAT SL) Place 0.4 mg under the tongue       omeprazole (PRILOSEC) 20 MG DR capsule Take 20 mg by mouth as needed       sildenafil (VIAGRA) 25 MG tablet Take 25 mg by mouth as needed. As needed       warfarin (COUMADIN) 5 MG tablet Take 5 mg on Mon and 2.5 mg all other days OR as directed by INR clinic (Patient not taking: Reported on 2020) 100 tablet 0       PAST SURGICAL HISTORY:  Past Surgical History:   Procedure Laterality Date     ANGIOPLASTY  1992    x 4     OPEN REDUCTION INTERNAL FIXATION ANKLE Right 1978     REPAIR ANEURYSM ABDOMINAL AORTA  2018    [facility]        ALLERGIES:   No Known Allergies    FAMILY HISTORY:  Family History   Problem Relation Age of Onset     Diabetes Mother      Heart Disease Mother         CHF     Cerebrovascular Disease Mother      Macular Degeneration Mother      Diabetes Sister      Cerebrovascular Disease Sister      Heart Disease Brother         CHF     Cerebrovascular Disease Brother      Heart Disease Sister         CHF     Cancer No family hx of      Hypertension No family hx of      Thyroid Disease No family hx of      Glaucoma No family hx of          SOCIAL HISTORY:  Social History     Tobacco Use     Smoking status: Former Smoker     Packs/day: 1.00     Years: 30.00     Pack years: 30.00     Types: Cigarettes     Last attempt to quit: 1983     Years since quittin.9     Smokeless tobacco: Never Used   Substance Use Topics     Alcohol use: No     Alcohol/week: 0.0 standard drinks     Drug use: No       ROS:   Constitutional: No fever, chills, or sweats. Weight stable.   ENT: No visual disturbance, ear ache, epistaxis, sore throat.   Cardiovascular: As per HPI.   Respiratory: No cough, hemoptysis.    GI: No nausea, vomiting, hematemesis, melena, or hematochezia.   : No hematuria.   Integument: Negative.   Psychiatric: Negative.   Hematologic:  No  easy bruising, no easy bleeding.  Neuro: Negative.   Endocrinology: No significant heat or cold intolerance   Musculoskeletal: No myalgia.    Exam:  Physical Exam Elements attainable via telehealth:       Constitutional - alert and no distress    Eyes - no redness, no discharge    Respiratory - no cough, no labored breathing    Skin - no discoloration or lesions on the face or the arm.    Neurological -alert, normal speech,and affect, no tremor.     I have reviewed the labs and personally reviewed the imaging below and made my comment in the assessment and plan.    Labs:  CBC RESULTS:   Lab Results   Component Value Date    WBC 9.0 11/29/2018    RBC 4.23 (L) 11/29/2018    HGB 12.3 (L) 11/29/2018    HCT 38.4 (L) 11/29/2018    MCV 91 11/29/2018    MCH 29.1 11/29/2018    MCHC 32.0 11/29/2018    RDW 15.5 (H) 11/29/2018     11/29/2018       BMP RESULTS:  Lab Results   Component Value Date     11/29/2018    POTASSIUM 4.2 11/29/2018    CHLORIDE 104 11/29/2018    CO2 29 11/29/2018    ANIONGAP 5 11/29/2018    GLC 93 11/29/2018    BUN 19 11/29/2018    CR 1.03 11/29/2018    GFRESTIMATED 69 11/29/2018    GFRESTBLACK 84 11/29/2018    YUNIOR 9.1 11/29/2018        INR RESULTS:  Lab Results   Component Value Date    INR 2.5 (A) 12/27/2018    INR 2.7 (A) 12/06/2018    INR 3.5 (A) 11/26/2018    INR 4.6 (A) 11/19/2018    INR 2.10 (H) 01/26/2017    INR 2.20 (H) 02/08/2016    INR 2.10 (H) 12/23/2015     Chest CT 6/11/2020 [facility]:     1.  New irregular 22 x 8 mm subpleural right lower lobe pulmonary nodule with  adjacent smaller nodularity. This is suspicious for pulmonary malignancy  although the possibility of infection is not excluded. This is atypical for  COVID 19 pneumonia. See follow-up recommendation below.  2.  Stable groundglass nodule in the left lower lobe.    Echocardiogram 1/13/2019:   1. Mildly enlarged left atrium.   2. Grade 1 pattern of LV diastolic filling.   3. Normal left ventricular size, borderline  wall thickness, normal global systolic function, calculated EF of 68 %.   4. Right ventricular cavity size is normal, global systolic RV function is normal.   5. The aortic valve is normal and trileaflet, no stenosis and mild regurgitation.   6. The mitral valve is sclerotic, mild mitral regurgitation.   7. 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   8. No pericardial effusion    EKG 6/12/2020 [facility] sinus rhythm with PACs.    Assessment and Plan:   Mr. Pranay Dubon is a 84 year old  male with PMH significant for chronic iron deficiency anemia, heart failure with preserved ejection fraction currently stable requiring no diuretics, hypertension, paroxysmal atrial fibrillation on aspirin only, AAA repair in 11/2018 and CAD S/P PCI in 1985×4 (no coronary angiogram since then), and small bowel obstruction requiring abdominal surgery.    Patient appears to have multiple noncardiac issues.  Luckily he is stable from cardiac standpoint.  During his last hospital admission he was in sinus rhythm.  His only concern is feeling off balance and sinus bradycardia.  He is currently on metoprolol 25 mg twice daily.  He is wondering if he can lower the dose and increase his pulse rate is a little bit which might help his dizziness when he is upright and walking.  I think that is a good idea.  I recommended patient to take only 1 tablet and lower the dose to 25 mg daily.    He also was wondering today how long he needs to be on iron treatment.  He also mentioned about his semi-incontinence and chronic diarrhea.  I strongly recommended patient to follow-up on these with his primary care physician.    [facility].    Paroxysmal atrial fibrillation does not appear to be a significant problem.  So far patient reports that he was only diagnosed with A. fib when he had his surgery for AAA while he was at the [facility].  I have reviewed  patient's all EKGs from care everywhere.  I have not seen any EKG showing A. fib.  I think he does not need to be on anticoagulation.  Recommended to continue with aspirin.  Of note patient has history of chronic iron loss anemia with hemoglobin dropping to 6 g/dL in the past.  He has been maintaining stable hemoglobin with iron replacement.      I have also noticed that the radiologist from [facility] on the CT scan dated 6/11/2020 reported a new irregular 22 mm subpleural right lower lobe pulmonary nodule suspicious for malignancy.  I will inform patient's PCP about the findings to see if he would like to follow-up on this or refer to pulmonary for follow-up.  The patient today tells me that he cannot gain weight despite eating.  I am not sure if this symptom is due to underlying malignancy versus chronic diarrhea that he has (I have noticed this result after my clinic visit.  I am not sure if the patient is aware of this result).  There is no note about this finding in the discharge summary.      Medication change today: Decrease the metoprolol dose to 25 mg daily.  Continue aspirin, atorvastatin, omeprazole and iron supplementation.    Return to clinic 1 year.    A total of 30 minutes spent face-to-face through video encounter today with greater than 50% of the time spent in counseling and coordinating cares of the issues above.   I have spent an additional 60 minutes reviewing patient's old his prior medical records from care everywhere.    Please donot hesitate to contact me if you have any questions or concerns. Again, thank you for allowing me to participate in the care of your patient.    Ricardo GROSSMAN MD  [facility] Division of Cardiology  Pager [phone]

## 2020-07-07 RX ORDER — METOPROLOL TARTRATE 25 MG/1
25 TABLET, FILM COATED ORAL DAILY
Qty: 90 TABLET | Refills: 3 | Status: SHIPPED | OUTPATIENT
Start: 2020-07-07 | End: 2021-01-19

## 2020-07-07 NOTE — PATIENT INSTRUCTIONS
Patient Instructions:  Metoprolol : take 25 mg once daily.  Return in one year for follow up.  You will receive a scheduling reminder in advance.     It was a pleasure to see you in the cardiology clinic today.    We are encouraging the use of [employer] to communicate with your Healthcare Provider.  If you have any questions, call  Peña Pedroza LPN, at [phone].  Press Option #1 for the [facility], and then press Option #4 for nursing.  Cardiology Fax  : [phone]      If you have an urgent need after hours (8:00 am to 4:30 pm) please call [phone] and ask for the cardiology fellow on call.

## 2020-10-08 ENCOUNTER — TELEPHONE (OUTPATIENT)
Dept: FAMILY MEDICINE | Facility: CLINIC | Age: 85
End: 2020-10-08

## 2020-10-08 DIAGNOSIS — R91.8 PULMONARY NODULES: Primary | ICD-10-CM

## 2020-10-08 NOTE — TELEPHONE ENCOUNTER
Pt was given provider's message as written. States he would like to get a copy of the scan done at [facility] on 6/11/20 to review this. He is going to contact Atilioina to request this. He would like to review this before he makes a decision about seeing pulmonary.     Shana Gonzalez RN  [facility]

## 2020-10-08 NOTE — TELEPHONE ENCOUNTER
From: Ramone Chávez MD  Sent: 10/8/2020   1:09 PM CDT  To: [facility] Rn Triage Pool    Please call patient to inquire whether he's already seeing a pulmonologist for his lung nodule seen on his chest CT in June (care everywhere).  I have placed a new referral for him if he needs one.    Ramone Chávez MD

## 2020-10-23 ENCOUNTER — MYC MEDICAL ADVICE (OUTPATIENT)
Dept: FAMILY MEDICINE | Facility: CLINIC | Age: 85
End: 2020-10-23

## 2020-11-10 ENCOUNTER — TRANSFERRED RECORDS (OUTPATIENT)
Dept: HEALTH INFORMATION MANAGEMENT | Facility: CLINIC | Age: 85
End: 2020-11-10

## 2020-12-08 ENCOUNTER — TRANSFERRED RECORDS (OUTPATIENT)
Dept: HEALTH INFORMATION MANAGEMENT | Facility: CLINIC | Age: 85
End: 2020-12-08

## 2020-12-11 ENCOUNTER — DOCUMENTATION ONLY (OUTPATIENT)
Dept: ONCOLOGY | Facility: CLINIC | Age: 85
End: 2020-12-11

## 2020-12-11 ENCOUNTER — TRANSFERRED RECORDS (OUTPATIENT)
Dept: HEALTH INFORMATION MANAGEMENT | Facility: CLINIC | Age: 85
End: 2020-12-11

## 2020-12-11 NOTE — PROGRESS NOTES
Action    Action Taken 12/11/2020 10:46am   I spoke to pt Armen yesterday and his outside records are at the [facility].     I called the [facility] Radiology Dept. [facility] s Radiology Dept Ph: [phone] Fax: [phone]. I spoke to Maribel and will work on sending the disc out soon. I faxed over a request for Imaging and image reports. Armen had the PET scan on Dec 8th 2020.    I also [facility] P[facility] report.      12/15/2020 8:38am   [facility] to follow up on the PET scan. I spoke to a different woman in the imaging dept- she's going to make sure the IMG disc got sent out. I also asked for the PET IMG report to be faxed. [facility] only sent the PFT report.     12/16/2020 8:46am   I called the [facility] again. They are going to fax over the report and mail the PET scan to the [facility] File .

## 2021-01-16 ENCOUNTER — MYC MEDICAL ADVICE (OUTPATIENT)
Dept: CARDIOLOGY | Facility: CLINIC | Age: 86
End: 2021-01-16

## 2021-01-19 DIAGNOSIS — I10 BENIGN ESSENTIAL HYPERTENSION: ICD-10-CM

## 2021-01-19 DIAGNOSIS — E78.5 HYPERLIPIDEMIA WITH TARGET LDL LESS THAN 100: ICD-10-CM

## 2021-01-19 RX ORDER — METOPROLOL TARTRATE 25 MG/1
25 TABLET, FILM COATED ORAL DAILY
Qty: 90 TABLET | Refills: 3 | Status: SHIPPED | OUTPATIENT
Start: 2021-01-19 | End: 2022-01-24

## 2021-01-19 RX ORDER — ATORVASTATIN CALCIUM 40 MG/1
40 TABLET, FILM COATED ORAL DAILY
Qty: 90 TABLET | Refills: 3 | Status: SHIPPED | OUTPATIENT
Start: 2021-01-19 | End: 2021-01-19

## 2021-01-19 RX ORDER — ATORVASTATIN CALCIUM 40 MG/1
40 TABLET, FILM COATED ORAL DAILY
Qty: 90 TABLET | Refills: 3 | Status: SHIPPED | OUTPATIENT
Start: 2021-01-19 | End: 2022-01-25

## 2021-03-22 ENCOUNTER — OFFICE VISIT (OUTPATIENT)
Dept: OPHTHALMOLOGY | Facility: CLINIC | Age: 86
End: 2021-03-22
Payer: COMMERCIAL

## 2021-03-22 ENCOUNTER — MYC MEDICAL ADVICE (OUTPATIENT)
Dept: OPHTHALMOLOGY | Facility: CLINIC | Age: 86
End: 2021-03-22

## 2021-03-22 DIAGNOSIS — Z01.01 ENCOUNTER FOR EXAMINATION OF EYES AND VISION WITH ABNORMAL FINDINGS: Primary | ICD-10-CM

## 2021-03-22 DIAGNOSIS — Z96.1 PSEUDOPHAKIA: ICD-10-CM

## 2021-03-22 DIAGNOSIS — H26.491 POSTERIOR CAPSULAR OPACIFICATION VISUALLY SIGNIFICANT OF RIGHT EYE: ICD-10-CM

## 2021-03-22 DIAGNOSIS — H52.4 PRESBYOPIA: ICD-10-CM

## 2021-03-22 DIAGNOSIS — H26.492 POSTERIOR CAPSULAR OPACIFICATION VISUALLY SIGNIFICANT OF LEFT EYE: ICD-10-CM

## 2021-03-22 DIAGNOSIS — H43.813 POSTERIOR VITREOUS DETACHMENT, BILATERAL: ICD-10-CM

## 2021-03-22 PROBLEM — R07.9 CHEST PAIN: Status: ACTIVE | Noted: 2020-06-11

## 2021-03-22 PROBLEM — Z86.0100 HISTORY OF COLONIC POLYPS: Status: ACTIVE | Noted: 2021-03-22

## 2021-03-22 PROBLEM — N13.8 HYPERTROPHY OF PROSTATE WITH URINARY OBSTRUCTION AND OTHER LOWER URINARY TRACT SYMPTOMS (LUTS): Status: ACTIVE | Noted: 2021-03-22

## 2021-03-22 PROBLEM — N40.1 HYPERTROPHY OF PROSTATE WITH URINARY OBSTRUCTION AND OTHER LOWER URINARY TRACT SYMPTOMS (LUTS): Status: ACTIVE | Noted: 2021-03-22

## 2021-03-22 PROBLEM — I71.40 ABDOMINAL AORTIC ANEURYSM WITHOUT RUPTURE (H): Status: ACTIVE | Noted: 2021-03-22

## 2021-03-22 PROBLEM — I50.32 CHRONIC DIASTOLIC (CONGESTIVE) HEART FAILURE (H): Status: ACTIVE | Noted: 2019-02-06

## 2021-03-22 PROBLEM — R20.9 SKIN SENSATION DISTURBANCE: Status: ACTIVE | Noted: 2021-03-22

## 2021-03-22 PROBLEM — D50.0 IRON DEFICIENCY ANEMIA DUE TO CHRONIC BLOOD LOSS: Status: ACTIVE | Noted: 2019-05-02

## 2021-03-22 PROBLEM — N18.30 CKD (CHRONIC KIDNEY DISEASE), STAGE III (H): Status: ACTIVE | Noted: 2019-02-17

## 2021-03-22 PROBLEM — C34.32 MALIGNANT NEOPLASM OF LOWER LOBE OF LEFT LUNG (H): Status: ACTIVE | Noted: 2021-03-22

## 2021-03-22 PROBLEM — Z87.09 H/O ACUTE RESPIRATORY FAILURE: Status: ACTIVE | Noted: 2019-01-11

## 2021-03-22 PROBLEM — R53.1 GENERALIZED WEAKNESS: Status: ACTIVE | Noted: 2019-02-17

## 2021-03-22 PROCEDURE — 92004 COMPRE OPH EXAM NEW PT 1/>: CPT | Performed by: OPHTHALMOLOGY

## 2021-03-22 PROCEDURE — 92015 DETERMINE REFRACTIVE STATE: CPT | Performed by: OPHTHALMOLOGY

## 2021-03-22 ASSESSMENT — VISUAL ACUITY
METHOD: SNELLEN - LINEAR
OD_SC+: -1
OS_SC: 20/25
OD_PH_CC: 20/30
OD_SC: 20/40
OS_CC: J1
OD_PH_CC+: -2
OD_CC: J5
OS_SC+: -2

## 2021-03-22 ASSESSMENT — SLIT LAMP EXAM - LIDS
COMMENTS: NORMAL
COMMENTS: NORMAL

## 2021-03-22 ASSESSMENT — REFRACTION_MANIFEST
OS_AXIS: 142
OD_ADD: +2.75
OD_CYLINDER: +0.50
OD_SPHERE: +0.75
OS_SPHERE: -0.50
OS_CYLINDER: +0.50
OS_ADD: +2.75
OD_AXIS: 172

## 2021-03-22 ASSESSMENT — REFRACTION_WEARINGRX
OD_SPHERE: +1.75
SPECS_TYPE: SVL
OD_CYLINDER: SPHERE
OS_CYLINDER: SPHERE
OS_SPHERE: +2.00

## 2021-03-22 ASSESSMENT — TONOMETRY
OS_IOP_MMHG: 14
IOP_METHOD: APPLANATION
OD_IOP_MMHG: 13

## 2021-03-22 ASSESSMENT — CUP TO DISC RATIO
OD_RATIO: 0.6
OS_RATIO: 0.7

## 2021-03-22 ASSESSMENT — CONF VISUAL FIELD
OD_NORMAL: 1
OS_NORMAL: 1

## 2021-03-22 ASSESSMENT — EXTERNAL EXAM - LEFT EYE: OS_EXAM: MILD TO MOD BROW, PROLAPSED FAT PADS: UPPER, LOWER

## 2021-03-22 ASSESSMENT — EXTERNAL EXAM - RIGHT EYE: OD_EXAM: MILD TO MOD BROW, PROLAPSED FAT PADS: UPPER, LOWER

## 2021-03-22 NOTE — LETTER
3/22/2021         RE: Pranay Dubon  [address] [facility] [address]        Dear Colleague,    Thank you for referring your patient, Pranay Dubon, to the [facility]. Please see a copy of my visit note below.     Current Eye Medications:  None.     Subjective:  Comprehensive Eye Exam.  Patient complains of decreasing vision in his right eye, distance and near.  Vision is good, left eye.  He wears glasses for reading, only.     Had cataract surgery both eyes in interim at [facility]  Recent bowel surgery; slow recovery.     Objective:  See Ophthalmology Exam.       Assessment:  Visually significant posterior capsule opacity right eye.      ICD-10-CM    1. Encounter for examination of eyes and vision with abnormal findings  Z01.01    2. Presbyopia  H52.4 REFRACTIVE STATUS   3. Pseudophakia, ou  Z96.1    4. Posterior capsular opacification visually significant of right eye  H26.491    5. Posterior vitreous detachment, bilateral  H43.813    6. Posterior capsular opacification visually significant of left eye  H26.492          Plan:  Yag Capsulotomy performed without problems right eye under Proparacaine anesthetic. Well tolerated.  Number of applications: 108  Power of applications: 1.7 mJ  Iopidine given.    Kaushik Salazar M.D.               Again, thank you for allowing me to participate in the care of your patient.        Sincerely,        Kaushik Salazar MD

## 2021-03-22 NOTE — PATIENT INSTRUCTIONS
Your eye may be slightly red, sore, and blurry for a few days.  You may notice some floaters for a few days.  Return Visit in about 1 week for S/P YAG Cap right eye  - intraocular pressure check and refraction; possible Yag Caps left eye.     Kaushik Salazar M.D.  [phone]

## 2021-03-25 PROBLEM — H26.492 POSTERIOR CAPSULAR OPACIFICATION VISUALLY SIGNIFICANT OF LEFT EYE: Status: ACTIVE | Noted: 2021-03-25

## 2021-03-25 PROBLEM — Z96.1 PSEUDOPHAKIA: Status: ACTIVE | Noted: 2021-03-25

## 2021-03-25 PROBLEM — H25.813 COMBINED FORMS OF AGE-RELATED CATARACT OF BOTH EYES: Status: RESOLVED | Noted: 2018-01-26 | Resolved: 2021-03-25

## 2021-04-01 ENCOUNTER — OFFICE VISIT (OUTPATIENT)
Dept: OPHTHALMOLOGY | Facility: CLINIC | Age: 86
End: 2021-04-01
Payer: COMMERCIAL

## 2021-04-01 DIAGNOSIS — Z96.1 PSEUDOPHAKIA: Primary | ICD-10-CM

## 2021-04-01 DIAGNOSIS — H26.492 POSTERIOR CAPSULAR OPACIFICATION VISUALLY SIGNIFICANT OF LEFT EYE: ICD-10-CM

## 2021-04-01 PROCEDURE — 99207 PR NO CHARGE LOS: CPT | Mod: 25 | Performed by: OPHTHALMOLOGY

## 2021-04-01 PROCEDURE — 66821 AFTER CATARACT LASER SURGERY: CPT | Mod: LT | Performed by: OPHTHALMOLOGY

## 2021-04-01 ASSESSMENT — REFRACTION_MANIFEST
OD_SPHERE: -0.50
OD_ADD: +3.00
OD_AXIS: 120
OD_CYLINDER: +0.75

## 2021-04-01 ASSESSMENT — SLIT LAMP EXAM - LIDS
COMMENTS: NORMAL
COMMENTS: NORMAL

## 2021-04-01 ASSESSMENT — VISUAL ACUITY
METHOD: SNELLEN - LINEAR
OD_SC+: -3
OS_SC+: -2
OD_SC: 20/20
OS_SC: 20/25

## 2021-04-01 ASSESSMENT — EXTERNAL EXAM - LEFT EYE: OS_EXAM: MILD TO MOD BROW, PROLAPSED FAT PADS: UPPER, LOWER

## 2021-04-01 ASSESSMENT — TONOMETRY
OD_IOP_MMHG: 09
IOP_METHOD: APPLANATION
OS_IOP_MMHG: 14

## 2021-04-01 ASSESSMENT — CUP TO DISC RATIO: OS_RATIO: 0.7

## 2021-04-01 ASSESSMENT — EXTERNAL EXAM - RIGHT EYE: OD_EXAM: MILD TO MOD BROW, PROLAPSED FAT PADS: UPPER, LOWER

## 2021-04-01 NOTE — PATIENT INSTRUCTIONS
Your eye may be slightly red, sore, and blurry for a few days.  You may notice some floaters for a few days.  Keep scheduled return visit for a intraocular pressure check and refraction in about one week.    Kaushik Salazar M.D.  [phone]

## 2021-04-01 NOTE — LETTER
4/1/2021         RE: Pranay Dubon  [address] [facility] [address]        Dear Colleague,    Thank you for referring your patient, Pranay Dubon, to the [facility]. Please see a copy of my visit note below.     Current Eye Medications:  none     Subjective: here for S/P YAG Cap right eye  - intraocular pressure check and refraction; possible Yag Caps left eye. Consent signed left eye. After the laser right eye felt like he came out of the darkness. Thought his computer screens were going bad until he had the laser.      Objective:  See Ophthalmology Exam.       Assessment:  Doing well s/p Yag Caps right eye.  Visually significant posterior capsule opacity left eye       Plan: Yag Capsulotomy performed without problems left eye under Proparacaine anesthetic.  Well tolerated.  Number of applications: 44  Power of applications: 1.9 mJ  Iopidine given.    Kaushik Salazar M.D.                Again, thank you for allowing me to participate in the care of your patient.        Sincerely,        Kaushik Salazar MD     Decrease to 28/10 wean as tolerated Hold CPAP at 10 for now. Wean as tolerated Hold CPAP at 5 for now. Wean as tolerated Hold CPAP at 5 for now. Wean as tolerated wean HFNC as tolerated  Wean oxygen as indicated   trial oral feeding and watch respiratory status

## 2021-04-01 NOTE — PROGRESS NOTES
Current Eye Medications:  none     Subjective: here for S/P YAG Cap right eye  - intraocular pressure check and refraction; possible Yag Caps left eye. Consent signed left eye. After the laser right eye felt like he came out of the darkness. Thought his computer screens were going bad until he had the laser.      Objective:  See Ophthalmology Exam.       Assessment:  Doing well s/p Yag Caps right eye.  Visually significant posterior capsule opacity left eye       Plan: Yag Capsulotomy performed without problems left eye under Proparacaine anesthetic.  Well tolerated.  Number of applications: 44  Power of applications: 1.9 mJ  Iopidine given.    Kaushik Salazar M.D.             reviewed urine - no laceration noted on pelvic exam, follow up with obgyn

## 2021-04-09 ENCOUNTER — OFFICE VISIT (OUTPATIENT)
Dept: OPHTHALMOLOGY | Facility: CLINIC | Age: 86
End: 2021-04-09
Payer: COMMERCIAL

## 2021-04-09 DIAGNOSIS — Z96.1 PSEUDOPHAKIA: Primary | ICD-10-CM

## 2021-04-09 PROCEDURE — 99024 POSTOP FOLLOW-UP VISIT: CPT | Performed by: OPHTHALMOLOGY

## 2021-04-09 ASSESSMENT — TONOMETRY
IOP_METHOD: APPLANATION
OS_IOP_MMHG: 12
OD_IOP_MMHG: 10

## 2021-04-09 ASSESSMENT — REFRACTION_MANIFEST
OD_SPHERE: PLANO
OD_CYLINDER: SPHERE
OD_ADD: +3.00
OS_ADD: +3.00
OS_SPHERE: -0.50
OS_CYLINDER: +0.50
OS_AXIS: 110

## 2021-04-09 ASSESSMENT — VISUAL ACUITY
METHOD: SNELLEN - LINEAR
OD_SC: 20/20
OS_SC: 20/25

## 2021-04-09 NOTE — PROGRESS NOTES
Current Eye Medications:       Subjective:  Post yag cap left eye.  Pt reports he is seeing well.    Has prescription reader from [facility].     Objective:  See Ophthalmology Exam.       Assessment:  Doing well s/p Yag Caps left eye.      Plan:  Glasses Rx given - optional  Could try OTC +1.25 for computer.  Call in December 2021 for an appointment in April 2022 for Complete Exam    Dr. Salazar [phone]

## 2021-04-09 NOTE — LETTER
4/9/2021         RE: Pranay Dubon  [address] [facility] [address]        Dear Colleague,    Thank you for referring your patient, Pranay Dubon, to the [facility]. Please see a copy of my visit note below.     Current Eye Medications:       Subjective:  Post yag cap left eye.  Pt reports he is seeing well.     Objective:  See Ophthalmology Exam.       Assessment:  Doing well s/p Yag Caps left eye.      Plan:  Glasses Rx given - optional  Could try OTC +1.25 for computer.  Call in December 2021 for an appointment in April 2022 for Complete Exam    Dr. Salazar [phone]           Again, thank you for allowing me to participate in the care of your patient.        Sincerely,        Kaushik Salazar MD

## 2021-04-09 NOTE — PATIENT INSTRUCTIONS
Glasses Rx given - optional  Could try OTC +1.25 for computer.  Call in December 2021 for an appointment in April 2022 for Complete Exam    Dr. Salazar [phone]

## 2021-04-10 ENCOUNTER — HEALTH MAINTENANCE LETTER (OUTPATIENT)
Age: 86
End: 2021-04-10

## 2021-04-11 PROBLEM — H26.492 POSTERIOR CAPSULAR OPACIFICATION VISUALLY SIGNIFICANT OF LEFT EYE: Status: RESOLVED | Noted: 2021-03-25 | Resolved: 2021-04-11

## 2021-04-11 ASSESSMENT — SLIT LAMP EXAM - LIDS
COMMENTS: NORMAL
COMMENTS: NORMAL

## 2021-04-11 ASSESSMENT — EXTERNAL EXAM - LEFT EYE: OS_EXAM: MILD TO MOD BROW, PROLAPSED FAT PADS: UPPER, LOWER

## 2021-04-11 ASSESSMENT — EXTERNAL EXAM - RIGHT EYE: OD_EXAM: MILD TO MOD BROW, PROLAPSED FAT PADS: UPPER, LOWER

## 2021-07-23 NOTE — PROGRESS NOTES
Cardiology Progress Note  August 2, 2021      HPI: Mr. Pranay Dubon is a 85 year old male patient of Dr. Bourgeois who presents for annual cardiology follow-up. His PMH is significant for heart failure with preserved ejection fraction, hypertension, CAD S/P PCI in 1985×4 (no coronary angiogram since then), AAA repair in 11/2018, paroxysmal atrial fibrillation (one episode post AAA repair 2018) on aspirin only due to history of GI bleeding, chronic iron deficiency anemia, and small bowel obstruction c/b sepsis and SHANTEL requiring short term RRT and abdominal surgery January 2019C, CKD and lung nodule s/p radiation. His last ECHO In 2019 showed normal biventricular systolic function, grade 1 diastolic dysfunction, mild MR, mildly dilated ascending aorta 3.9 cm. He was admitted to [facility] June 2020 with chest pain and bradycardia, his cardiac work-up was negative. He was last evaluated by Dr. Bourgeois 7/2020. He reported presyncope and poor balance, his metoprolol dose was decreased to 25 mg daily.     Interval History:   Today he reports feeling fair. He says he is doing better than he was last time he was evaluated. He stays active gardening 30 minutes a day and water plants 45 minutes a day.  He climbs the stairs in his home a few times a day. He denies any chest pain or shortness of breath with ordinary physical activity. He denies orthopnea, PND, leg swelling or weight gain. He had an episode of his pulse increasing to 80 a couple of weeks ago, no associated heart racing, lightheadedness or syncope. He denies any symptoms reminiscent of his prior episode of a-fib. He has multiple noncardiac concerns including chills and night sweats. He also says he is more tired than normal and has been going to bed earlier. He reports lower back pain that wakes him up around 4 a.m., he takes ibuprofen with relief. He has been checking his home blood pressures - he does not recall measurements but believes they are less than 130.       He has been struggling with anemia and CKD, baseline creatinine 1.5. [facility] prior. He is following with radiology regarding a lung nodule and is receiving radiation treatments. Per patient the nodule is benign.     Current medications include metoprolol 25 mg daily, atorvastatin 40 mg daily,  mg daily.     PAST MEDICAL HISTORY:  Past Medical History:   Diagnosis Date     Atrial fibrillation (H)      BPH (benign prostatic hyperplasia)      CAD (coronary artery disease)     4 stents     ED (erectile dysfunction)      GERD (gastroesophageal reflux disease)      Hypercholesterolemia      Hypertension goal BP (blood pressure) < 140/90      Lumbar stenosis      Microscopic hematuria     normal urologic evaluation      Nonsenile cataract      Obesity      SNHL (sensorineural hearing loss)      Type 2 diabetes mellitus without complications (H)        CURRENT MEDICATIONS:  Current Outpatient Medications   Medication Sig Dispense Refill     aspirin (ASA) 325 MG EC tablet Take 325 mg by mouth daily       atorvastatin (LIPITOR) 40 MG tablet Take 1 tablet (40 mg) by mouth daily 90 tablet 3     metoprolol tartrate (LOPRESSOR) 25 MG tablet Take 1 tablet (25 mg) by mouth daily 90 tablet 3     multivitamin w/minerals (MULTI-VITAMIN) tablet Take 2 tablets by mouth daily        Nitroglycerin (NITROSTAT SL) Place 0.4 mg under the tongue       omeprazole (PRILOSEC) 20 MG DR capsule Take 20 mg by mouth as needed       sildenafil (VIAGRA) 25 MG tablet Take 25 mg by mouth as needed. As needed         PAST SURGICAL HISTORY:  Past Surgical History:   Procedure Laterality Date     ANGIOPLASTY  1992    x 4     CATARACT IOL, RT/LT      [facility] about 2018-9     LASER YAG CAPSULOTOMY Right 03/22/2021     LASER YAG CAPSULOTOMY Left 04/01/2021     OPEN REDUCTION INTERNAL FIXATION ANKLE Right 1978     REPAIR ANEURYSM ABDOMINAL AORTA  11/09/2018    [facility] in the  groin        ALLERGIES:   No Known Allergies    FAMILY HISTORY:  Family History   Problem Relation Age of Onset     Diabetes Mother      Heart Disease Mother         CHF     Cerebrovascular Disease Mother      Macular Degeneration Mother      Diabetes Sister      Cerebrovascular Disease Sister      Heart Disease Brother         CHF     Cerebrovascular Disease Brother      Heart Disease Sister         CHF     Cancer No family hx of      Hypertension No family hx of      Thyroid Disease No family hx of      Glaucoma No family hx of      SOCIAL HISTORY:  Social History     Tobacco Use     Smoking status: Former Smoker     Packs/day: 1.00     Years: 30.00     Pack years: 30.00     Types: Cigarettes     Quit date: 1983     Years since quittin.9     Smokeless tobacco: Never Used   Substance Use Topics     Alcohol use: No     Alcohol/week: 0.0 standard drinks     Drug use: No     ROS:   Constitutional: +chills and night sweats  ENT: No visual disturbance, ear ache, epistaxis, sore throat.   Cardiovascular: As per HPI.   Respiratory: No cough, hemoptysis.    GI: No nausea, vomiting, hematemesis, melena, or hematochezia.   : No hematuria.   Integument: Negative.   Psychiatric: Negative.   Hematologic:  No easy bruising, no easy bleeding.  Neuro: Negative.   Endocrinology: No significant heat or cold intolerance   Musculoskeletal: No myalgia.    Exam:  /66 (BP Location: Right arm, Patient Position: Supine, Cuff Size: Adult Regular)   Pulse 61   Wt 59.4 kg (131 lb)   SpO2 99%   BMI 23.15 kg/m        Constitutional - alert and no distress    Eyes - no redness, no discharge    Respiratory - no cough, no labored breathing  CV: Normal S1S2, No JVP    Skin - no discoloration or lesions on the face or the arm.    Neurological -alert, normal speech,and affect, no tremor.    Labs:  CBC RESULTS:   Lab Results   Component Value Date    WBC 9.0 2018    RBC 4.23 (L) 2018    HGB 12.3 (L) 2018    HCT  38.4 (L) 11/29/2018    MCV 91 11/29/2018    MCH 29.1 11/29/2018    MCHC 32.0 11/29/2018    RDW 15.5 (H) 11/29/2018     11/29/2018       BMP RESULTS:  Lab Results   Component Value Date     11/29/2018    POTASSIUM 4.2 11/29/2018    CHLORIDE 104 11/29/2018    CO2 29 11/29/2018    ANIONGAP 5 11/29/2018    GLC 93 11/29/2018    BUN 19 11/29/2018    CR 1.03 11/29/2018    GFRESTIMATED 69 11/29/2018    GFRESTBLACK 84 11/29/2018    YUNIOR 9.1 11/29/2018      EKG today:   Personally reviewed and interpreted   NSR HR 65    Chest CT 6/11/2020 [facility]:     1.  New irregular 22 x 8 mm subpleural right lower lobe pulmonary nodule with  adjacent smaller nodularity. This is suspicious for pulmonary malignancy  although the possibility of infection is not excluded. This is atypical for  COVID 19 pneumonia. See follow-up recommendation below.  2.  Stable groundglass nodule in the left lower lobe.    Echocardiogram 1/13/2019:   1. Mildly enlarged left atrium.   2. Grade 1 pattern of LV diastolic filling.   3. Normal left ventricular size, borderline wall thickness, normal global systolic function, calculated EF of 68 %.   4. Right ventricular cavity size is normal, global systolic RV function is normal.   5. The aortic valve is normal and trileaflet, no stenosis and mild regurgitation.   6. The mitral valve is sclerotic, mild mitral regurgitation.   7. 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   8. No pericardial effusion    EKG 6/12/2020 [facility] sinus rhythm with PACs.    Assessment and Plan:   Mr. Pranay Dubon is a 85 year old male patient of Dr. Bourgeois who presents for annual cardiology follow-up. His PMH is significant for heart failure with preserved ejection fraction, hypertension, CAD S/P PCI in 1985 × 4 (no coronary angiogram since then), AAA repair in 11/2018, paroxysmal atrial fibrillation (one episode post AAA repair 2018) on aspirin only due to history of GI bleeding, chronic iron  deficiency anemia, and small bowel obstruction c/b sepsis and SHANTEL requiring short term RRT and abdominal surgery January 2019C, CKD and lung nodule s/p radiation. His last ECHO In 2019 showed normal biventricular systolic function, grade 1 diastolic dysfunction, mild MR, mildly dilated ascending aorta 3.9 cm. He was admitted to [facility] June 2020 with chest pain and bradycardia, his cardiac work-up was negative. He was last evaluated by Dr. Bourgeois 7/2020. He reported presyncope and poor balance, his metoprolol dose was decreased to 25 mg daily.     1. CAD: Asymptomatic. Continue medical management with ASA and atorvastatin.   2. HTN: At goal, labs next week.   3. HLD: Continue atorvastatin 40 mg daily, labs next week.   4. Hx of AF: No recent symptoms, EKG today NSR. Holter today to evaluate for recurrent AF.   5: HFpEF: Euvolemic, no diuretics indicated.   6. Hx of AAA: S/p repair 2018. [facility]  7. Mildly ascending aorta: Repeat echo in 1-2 weeks.   8. Benign pulmonary nodule: S/p radiation. Follow-up with radiology.     Medications: Continue aspirin, atorvastatin, metoprolol and omeprazole.    Return to clinic 1 year.    Answers for HPI/ROS submitted by the patient on 7/31/2021  General Symptoms: Yes  Skin Symptoms: No  HENT Symptoms: No  EYE SYMPTOMS: No  HEART SYMPTOMS: Yes  LUNG SYMPTOMS: No  INTESTINAL SYMPTOMS: Yes  URINARY SYMPTOMS: Yes  REPRODUCTIVE SYMPTOMS: No  SKELETAL SYMPTOMS: Yes  BLOOD SYMPTOMS: No  NERVOUS SYSTEM SYMPTOMS: No  MENTAL HEALTH SYMPTOMS: No  Fever: No  Loss of appetite: No  Weight loss: Yes  Weight gain: No  Fatigue: Yes  Night sweats: Yes  Chills: Yes  Increased stress: No  Excessive hunger: No  Excessive thirst: No  Feeling hot or cold when others believe the temperature is normal: Yes  Loss of height: No  Post-operative complications: No  Surgical site pain: No  Hallucinations: No  Change in or Loss of Energy: Yes  Hyperactivity: No  Confusion: No  Chest pain or  pressure: No  Fast or irregular heartbeat: Yes  Pain in legs with walking: Yes  Trouble breathing while lying down: No  Fingers or toes appear blue: No  High blood pressure: No  Low blood pressure: No  Fainting: No  Murmurs: No  Pacemaker: No  Varicose veins: No  Edema or swelling: No  Wake up at night with shortness of breath: No  Light-headedness: No  Exercise intolerance: Yes  Heart burn or indigestion: Yes  Vomiting: No  Abdominal pain: Yes  Bloating: No  Constipation: Yes  Diarrhea: Yes  Blood in stool: Yes  Black stools: No  Rectal or Anal pain: No  Fecal incontinence: Yes  Yellowing of skin or eyes: No  Vomit with blood: No  Change in stools: Yes  Trouble holding urine or incontinence: Yes  Pain or burning: No  Trouble starting or stopping: Yes  Increased frequency of urination: Yes  Blood in urine: No  Decreased frequency of urination: No  Frequent nighttime urination: Yes  Flank pain: No  Difficulty emptying bladder: Yes  Back pain: Yes  Muscle aches: Yes  Neck pain: Yes  Swollen joints: No  Joint pain: No  Bone pain: No  Muscle cramps: Yes  Muscle weakness: No  Joint stiffness: Yes  Bone fracture: No

## 2021-07-26 ENCOUNTER — PATIENT OUTREACH (OUTPATIENT)
Dept: FAMILY MEDICINE | Facility: CLINIC | Age: 86
End: 2021-07-26
Payer: COMMERCIAL

## 2021-07-26 NOTE — PROGRESS NOTES
Clinic Care Coordination Contact  Community Health Worker Initial Outreach            Patient accepts CC: Yes. Patient scheduled for assessment with SW on 7/27/21 at 9am. Patient noted desire to discuss house cleaning resources.     The Clinic Community Health Worker spoke with the patient today to discuss possible Clinic Care Coordination enrollment. The service was described to the patient and immediate needs were discussed. The patient agreed to enrollment and an assessment was scheduled. The patient was provided with contact information for the clinic CHW.               The patient called the CHW and asked for some help with getting someone to come out to his home and clean it for him.     Farnaz Mondragon  Community Health Worker   [facility]  Care Coordination  [place], [place], [place], [facility]  [email or web address]   Office: [phone]  Fax: [phone]

## 2021-07-27 ENCOUNTER — PATIENT OUTREACH (OUTPATIENT)
Dept: NURSING | Facility: CLINIC | Age: 86
End: 2021-07-27
Payer: COMMERCIAL

## 2021-07-31 ASSESSMENT — ENCOUNTER SYMPTOMS
DIFFICULTY URINATING: 1
FATIGUE: 1
BLOOD IN STOOL: 1
JAUNDICE: 0
FEVER: 0
HEMATURIA: 0
SLEEP DISTURBANCES DUE TO BREATHING: 0
DYSURIA: 0
PALPITATIONS: 1
BACK PAIN: 1
MUSCLE CRAMPS: 1
NIGHT SWEATS: 1
VOMITING: 0
ALTERED TEMPERATURE REGULATION: 1
POLYPHAGIA: 0
MUSCLE WEAKNESS: 0
INCREASED ENERGY: 1
ORTHOPNEA: 0
LIGHT-HEADEDNESS: 0
POLYDIPSIA: 0
HYPOTENSION: 0
WEIGHT GAIN: 0
HEARTBURN: 1
CONSTIPATION: 1
WEIGHT LOSS: 1
STIFFNESS: 1
ABDOMINAL PAIN: 1
NECK PAIN: 1
DIARRHEA: 1
RECTAL PAIN: 0
DECREASED APPETITE: 0
MYALGIAS: 1
JOINT SWELLING: 0
LEG PAIN: 1
ARTHRALGIAS: 0
SYNCOPE: 0
EXERCISE INTOLERANCE: 1
HALLUCINATIONS: 0
FLANK PAIN: 0
HYPERTENSION: 0
CHILLS: 1
BLOATING: 0
BOWEL INCONTINENCE: 1

## 2021-08-02 ENCOUNTER — ANCILLARY PROCEDURE (OUTPATIENT)
Dept: CARDIOLOGY | Facility: CLINIC | Age: 86
End: 2021-08-02
Attending: NURSE PRACTITIONER
Payer: COMMERCIAL

## 2021-08-02 ENCOUNTER — OFFICE VISIT (OUTPATIENT)
Dept: CARDIOLOGY | Facility: CLINIC | Age: 86
End: 2021-08-02
Attending: INTERNAL MEDICINE
Payer: COMMERCIAL

## 2021-08-02 VITALS
BODY MASS INDEX: 23.15 KG/M2 | DIASTOLIC BLOOD PRESSURE: 66 MMHG | SYSTOLIC BLOOD PRESSURE: 114 MMHG | OXYGEN SATURATION: 99 % | WEIGHT: 131 LBS | HEART RATE: 61 BPM

## 2021-08-02 DIAGNOSIS — I10 BENIGN ESSENTIAL HYPERTENSION: ICD-10-CM

## 2021-08-02 DIAGNOSIS — I48.0 PAROXYSMAL ATRIAL FIBRILLATION (H): ICD-10-CM

## 2021-08-02 DIAGNOSIS — N18.31 STAGE 3A CHRONIC KIDNEY DISEASE (H): ICD-10-CM

## 2021-08-02 DIAGNOSIS — Z98.890 HISTORY OF AAA (ABDOMINAL AORTIC ANEURYSM) REPAIR: ICD-10-CM

## 2021-08-02 DIAGNOSIS — I50.32 CHRONIC DIASTOLIC (CONGESTIVE) HEART FAILURE (H): ICD-10-CM

## 2021-08-02 DIAGNOSIS — I77.810 ASCENDING AORTA DILATATION (H): ICD-10-CM

## 2021-08-02 DIAGNOSIS — I25.10 CORONARY ARTERY DISEASE INVOLVING NATIVE CORONARY ARTERY OF NATIVE HEART WITHOUT ANGINA PECTORIS: Primary | ICD-10-CM

## 2021-08-02 PROCEDURE — 93005 ELECTROCARDIOGRAM TRACING: CPT

## 2021-08-02 PROCEDURE — 93225 XTRNL ECG REC<48 HRS REC: CPT

## 2021-08-02 PROCEDURE — 99204 OFFICE O/P NEW MOD 45 MIN: CPT | Mod: 25 | Performed by: NURSE PRACTITIONER

## 2021-08-02 PROCEDURE — 93227 XTRNL ECG REC<48 HR R&I: CPT | Performed by: INTERNAL MEDICINE

## 2021-08-02 PROCEDURE — G0463 HOSPITAL OUTPT CLINIC VISIT: HCPCS | Mod: 25

## 2021-08-02 RX ORDER — TAMSULOSIN HYDROCHLORIDE 0.4 MG/1
CAPSULE ORAL
COMMUNITY
Start: 2021-04-26 | End: 2022-08-17

## 2021-08-02 ASSESSMENT — PAIN SCALES - GENERAL: PAINLEVEL: NO PAIN (0)

## 2021-08-02 NOTE — NURSING NOTE
Chief Complaint   Patient presents with     Follow Up     Follow up     Vitals were taken, medications reconciled, and EKG was performed.    Cj Jensen, EMT  2:11 PM

## 2021-08-02 NOTE — PATIENT INSTRUCTIONS
You were seen today in the Cardiovascular Clinic at the [facility].      Cardiology Providers you saw during your visit:  Natalia Donis CNP    Recommendations:    - Have Holter placed today  - Scheduled ECHO 1-2 years  - See PCP with labs on Thursday    Follow-up:    - 1 year or sooner if you develop heart symptoms     Thank you for your visit today!     [facility]  [address] [place] [address]  Cardiology Clinic [id number]  [place] [address]

## 2021-08-02 NOTE — PROGRESS NOTES
Per Dr. Natalia Donis, patient to have 48 hour holter monitor placed.  Diagnosis: Paroxysmal atrial fibrillation I48.0  Monitor placed: Yes  Patient Instructed: Yes  Patient verbalized understanding: Yes  Holter # 2      Placed by Vini Vinson

## 2021-08-02 NOTE — LETTER
8/2/2021      RE: Pranay Dubon  [address] [facility] [address]       Dear Colleague,    Thank you for the opportunity to participate in the care of your patient, Pranay Dubon, at the [facility] [place] at [facility]. Please see a copy of my visit note below.    Cardiology Progress Note  August 2, 2021      HPI: Mr. Pranay Dubon is a 85 year old male patient of Dr. Bourgeois who presents for annual cardiology follow-up. His PMH is significant for heart failure with preserved ejection fraction, hypertension, CAD S/P PCI in 1985×4 (no coronary angiogram since then), AAA repair in 11/2018, paroxysmal atrial fibrillation (one episode post AAA repair 2018) on aspirin only due to history of GI bleeding, chronic iron deficiency anemia, and small bowel obstruction c/b sepsis and SHANTEL requiring short term RRT and abdominal surgery January 2019C, CKD and lung nodule s/p radiation. His last ECHO In 2019 showed normal biventricular systolic function, grade 1 diastolic dysfunction, mild MR, mildly dilated ascending aorta 3.9 cm. He was admitted to [facility] June 2020 with chest pain and bradycardia, his cardiac work-up was negative. He was last evaluated by Dr. Bourgeois 7/2020. He reported presyncope and poor balance, his metoprolol dose was decreased to 25 mg daily.     Interval History:   Today he reports feeling fair. He says he is doing better than he was last time he was evaluated. He stays active gardening 30 minutes a day and water plants 45 minutes a day.  He climbs the stairs in his home a few times a day. He denies any chest pain or shortness of breath with ordinary physical activity. He denies orthopnea, PND, leg swelling or weight gain. He had an episode of his pulse increasing to 80 a couple of weeks ago, no associated heart racing, lightheadedness or syncope. He denies any symptoms reminiscent of his prior episode of a-fib. He has multiple  noncardiac concerns including chills and night sweats. He also says he is more tired than normal and has been going to bed earlier. He reports lower back pain that wakes him up around 4 a.m., he takes ibuprofen with relief. He has been checking his home blood pressures - he does not recall measurements but believes they are less than 130.      He has been struggling with anemia and CKD, baseline creatinine 1.5. [facility]. He is following with radiology regarding a lung nodule and is receiving radiation treatments. Per patient the nodule is benign.     Current medications include metoprolol 25 mg daily, atorvastatin 40 mg daily,  mg daily.     PAST MEDICAL HISTORY:  Past Medical History:   Diagnosis Date     Atrial fibrillation (H)      BPH (benign prostatic hyperplasia)      CAD (coronary artery disease)     4 stents     ED (erectile dysfunction)      GERD (gastroesophageal reflux disease)      Hypercholesterolemia      Hypertension goal BP (blood pressure) < 140/90      Lumbar stenosis      Microscopic hematuria     normal urologic evaluation      Nonsenile cataract      Obesity      SNHL (sensorineural hearing loss)      Type 2 diabetes mellitus without complications (H)        CURRENT MEDICATIONS:  Current Outpatient Medications   Medication Sig Dispense Refill     aspirin (ASA) 325 MG EC tablet Take 325 mg by mouth daily       atorvastatin (LIPITOR) 40 MG tablet Take 1 tablet (40 mg) by mouth daily 90 tablet 3     metoprolol tartrate (LOPRESSOR) 25 MG tablet Take 1 tablet (25 mg) by mouth daily 90 tablet 3     multivitamin w/minerals (MULTI-VITAMIN) tablet Take 2 tablets by mouth daily        Nitroglycerin (NITROSTAT SL) Place 0.4 mg under the tongue       omeprazole (PRILOSEC) 20 MG DR capsule Take 20 mg by mouth as needed       sildenafil (VIAGRA) 25 MG tablet Take 25 mg by mouth as needed. As needed         PAST SURGICAL HISTORY:  Past Surgical History:    Procedure Laterality Date     ANGIOPLASTY  1992    x 4     CATARACT IOL, RT/LT      [facility] about 2018-     LASER YAG CAPSULOTOMY Right 2021     LASER YAG CAPSULOTOMY Left 2021     OPEN REDUCTION INTERNAL FIXATION ANKLE Right 1978     REPAIR ANEURYSM ABDOMINAL AORTA  2018    [facility] in the [facility]in        ALLERGIES:   No Known Allergies    FAMILY HISTORY:  Family History   Problem Relation Age of Onset     Diabetes Mother      Heart Disease Mother         CHF     Cerebrovascular Disease Mother      Macular Degeneration Mother      Diabetes Sister      Cerebrovascular Disease Sister      Heart Disease Brother         CHF     Cerebrovascular Disease Brother      Heart Disease Sister         CHF     Cancer No family hx of      Hypertension No family hx of      Thyroid Disease No family hx of      Glaucoma No family hx of      SOCIAL HISTORY:  Social History     Tobacco Use     Smoking status: Former Smoker     Packs/day: 1.00     Years: 30.00     Pack years: 30.00     Types: Cigarettes     Quit date: 1983     Years since quittin.9     Smokeless tobacco: Never Used   Substance Use Topics     Alcohol use: No     Alcohol/week: 0.0 standard drinks     Drug use: No     ROS:   Constitutional: +chills and night sweats  ENT: No visual disturbance, ear ache, epistaxis, sore throat.   Cardiovascular: As per HPI.   Respiratory: No cough, hemoptysis.    GI: No nausea, vomiting, hematemesis, melena, or hematochezia.   : No hematuria.   Integument: Negative.   Psychiatric: Negative.   Hematologic:  No easy bruising, no easy bleeding.  Neuro: Negative.   Endocrinology: No significant heat or cold intolerance   Musculoskeletal: No myalgia.    Exam:  /66 (BP Location: Right arm, Patient Position: Supine, Cuff Size: Adult Regular)   Pulse 61   Wt 59.4 kg (131 lb)   SpO2 99%   BMI 23.15 kg/m        Constitutional - alert and no distress    Eyes - no redness, no  discharge    Respiratory - no cough, no labored breathing  CV: Normal S1S2, No JVP    Skin - no discoloration or lesions on the face or the arm.    Neurological -alert, normal speech,and affect, no tremor.    Labs:  CBC RESULTS:   Lab Results   Component Value Date    WBC 9.0 11/29/2018    RBC 4.23 (L) 11/29/2018    HGB 12.3 (L) 11/29/2018    HCT 38.4 (L) 11/29/2018    MCV 91 11/29/2018    MCH 29.1 11/29/2018    MCHC 32.0 11/29/2018    RDW 15.5 (H) 11/29/2018     11/29/2018       BMP RESULTS:  Lab Results   Component Value Date     11/29/2018    POTASSIUM 4.2 11/29/2018    CHLORIDE 104 11/29/2018    CO2 29 11/29/2018    ANIONGAP 5 11/29/2018    GLC 93 11/29/2018    BUN 19 11/29/2018    CR 1.03 11/29/2018    GFRESTIMATED 69 11/29/2018    GFRESTBLACK 84 11/29/2018    YUNIOR 9.1 11/29/2018      EKG today:   Personally reviewed and interpreted   NSR HR 65    Chest CT 6/11/2020 [facility]:     1.  New irregular 22 x 8 mm subpleural right lower lobe pulmonary nodule with  adjacent smaller nodularity. This is suspicious for pulmonary malignancy  although the possibility of infection is not excluded. This is atypical for  COVID 19 pneumonia. See follow-up recommendation below.  2.  Stable groundglass nodule in the left lower lobe.    Echocardiogram 1/13/2019:   1. Mildly enlarged left atrium.   2. Grade 1 pattern of LV diastolic filling.   3. Normal left ventricular size, borderline wall thickness, normal global systolic function, calculated EF of 68 %.   4. Right ventricular cavity size is normal, global systolic RV function is normal.   5. The aortic valve is normal and trileaflet, no stenosis and mild regurgitation.   6. The mitral valve is sclerotic, mild mitral regurgitation.   7. 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   8. No pericardial effusion    EKG 6/12/2020 [facility] sinus rhythm with PACs.    Assessment and Plan:   Mr. Pranay Dubon is a 85 year old male patient of  Dr. Bourgeois who presents for annual cardiology follow-up. His PMH is significant for heart failure with preserved ejection fraction, hypertension, CAD S/P PCI in 1985 × 4 (no coronary angiogram since then), AAA repair in 11/2018, paroxysmal atrial fibrillation (one episode post AAA repair 2018) on aspirin only due to history of GI bleeding, chronic iron deficiency anemia, and small bowel obstruction c/b sepsis and SHANTEL requiring short term RRT and abdominal surgery January 2019C, CKD and lung nodule s/p radiation. His last ECHO In 2019 showed normal biventricular systolic function, grade 1 diastolic dysfunction, mild MR, mildly dilated ascending aorta 3.9 cm. He was admitted to [facility] June 2020 with chest pain and bradycardia, his cardiac work-up was negative. He was last evaluated by Dr. Bourgeois 7/2020. He reported presyncope and poor balance, his metoprolol dose was decreased to 25 mg daily.     1. CAD: Asymptomatic. Continue medical management with ASA and atorvastatin.   2. HTN: At goal, labs next week.   3. HLD: Continue atorvastatin 40 mg daily, labs next week.   4. Hx of AF: No recent symptoms, EKG today NSR. Holter today to evaluate for recurrent AF.   5: HFpEF: Euvolemic, no diuretics indicated.   6. Hx of AAA: S/p repair 2018[facility]  7. Mildly ascending aorta: Repeat echo in 1-2 weeks.   8. Benign pulmonary nodule: S/p radiation. Follow-up with radiology.     Medications: Continue aspirin, atorvastatin, metoprolol and omeprazole.    Return to clinic 1 year.    Answers for HPI/ROS submitted by the patient on 7/31/2021  General Symptoms: Yes  Skin Symptoms: No  HENT Symptoms: No  EYE SYMPTOMS: No  HEART SYMPTOMS: Yes  LUNG SYMPTOMS: No  INTESTINAL SYMPTOMS: Yes  URINARY SYMPTOMS: Yes  REPRODUCTIVE SYMPTOMS: No  SKELETAL SYMPTOMS: Yes  BLOOD SYMPTOMS: No  NERVOUS SYSTEM SYMPTOMS: No  MENTAL HEALTH SYMPTOMS: No  Fever: No  Loss of appetite: No  Weight loss: Yes  Weight gain: No  Fatigue:  Yes  Night sweats: Yes  Chills: Yes  Increased stress: No  Excessive hunger: No  Excessive thirst: No  Feeling hot or cold when others believe the temperature is normal: Yes  Loss of height: No  Post-operative complications: No  Surgical site pain: No  Hallucinations: No  Change in or Loss of Energy: Yes  Hyperactivity: No  Confusion: No  Chest pain or pressure: No  Fast or irregular heartbeat: Yes  Pain in legs with walking: Yes  Trouble breathing while lying down: No  Fingers or toes appear blue: No  High blood pressure: No  Low blood pressure: No  Fainting: No  Murmurs: No  Pacemaker: No  Varicose veins: No  Edema or swelling: No  Wake up at night with shortness of breath: No  Light-headedness: No  Exercise intolerance: Yes  Heart burn or indigestion: Yes  Vomiting: No  Abdominal pain: Yes  Bloating: No  Constipation: Yes  Diarrhea: Yes  Blood in stool: Yes  Black stools: No  Rectal or Anal pain: No  Fecal incontinence: Yes  Yellowing of skin or eyes: No  Vomit with blood: No  Change in stools: Yes  Trouble holding urine or incontinence: Yes  Pain or burning: No  Trouble starting or stopping: Yes  Increased frequency of urination: Yes  Blood in urine: No  Decreased frequency of urination: No  Frequent nighttime urination: Yes  Flank pain: No  Difficulty emptying bladder: Yes  Back pain: Yes  Muscle aches: Yes  Neck pain: Yes  Swollen joints: No  Joint pain: No  Bone pain: No  Muscle cramps: Yes  Muscle weakness: No  Joint stiffness: Yes  Bone fracture: No          Please do not hesitate to contact me if you have any questions/concerns.     Sincerely,     Natalia Donis NP

## 2021-08-03 LAB
ATRIAL RATE - MUSE: 65 BPM
DIASTOLIC BLOOD PRESSURE - MUSE: NORMAL MMHG
INTERPRETATION ECG - MUSE: NORMAL
P AXIS - MUSE: 37 DEGREES
PR INTERVAL - MUSE: 158 MS
QRS DURATION - MUSE: 98 MS
QT - MUSE: 434 MS
QTC - MUSE: 451 MS
R AXIS - MUSE: 22 DEGREES
SYSTOLIC BLOOD PRESSURE - MUSE: NORMAL MMHG
T AXIS - MUSE: 41 DEGREES
VENTRICULAR RATE- MUSE: 65 BPM

## 2021-08-13 ASSESSMENT — ACTIVITIES OF DAILY LIVING (ADL): DEPENDENT_IADLS:: INDEPENDENT

## 2021-08-13 NOTE — PROGRESS NOTES
Clinic Care Coordination Contact    Clinic Care Coordination Contact  OUTREACH    Referral Information: housekeeping resources     Referral Source: PCP      Primary Diagnosis: Psychosocial    Chief Complaint   Patient presents with     Clinic Care Coordination - Initial             Universal Utilization: also [facility]   Clinic Utilization  Difficulty keeping appointments:: No  Compliance Concerns: No  No-Show Concerns: No  No PCP office visit in Past Year: No  Utilization    Hospital Admissions  0             ED Visits  0             No Show Count (past year)  1                Current as of: 8/12/2021 10:24 PM              Clinical Concerns: Patient interested in housekeeping services, he reports he is currently taking care of spouse. He inquired if he would be eligible for [facility] [employer], SW discussed his income, he will not qualify.  Patient stated he is a , SW inquired if he has applied for [facility] benefits, he stated he thought he had and will call and inquire.  Patient did not wish for anything further, he accepted  contact information and will call if anything further is needed     Current Medical Concerns:   Patient Active Problem List   Diagnosis     Advanced directives, counseling/discussion     CAD (coronary artery disease)     Hyperlipidemia with target LDL less than 100     ED (erectile dysfunction)     Obesity     SNHL (sensorineural hearing loss)     Long-term (current) use of anticoagulants [Z79.01]     Chronic atrial fibrillation (H)     Benign essential hypertension     Coronary artery disease involving native heart without angina pectoris, unspecified vessel or lesion type     Posterior vitreous detachment, bilateral     Erectile dysfunction, unspecified erectile dysfunction type     S/P AAA repair     Malignant neoplasm of lower lobe of left lung (H)     Iron deficiency anemia due to chronic blood loss     Hypertrophy of prostate with urinary obstruction and other lower  urinary tract symptoms (LUTS)     History of colonic polyps     H/O acute respiratory failure     Generalized weakness     Skin sensation disturbance     Chronic diastolic (congestive) heart failure (H)     CKD (chronic kidney disease), stage III     Chest pain     Abdominal aortic aneurysm without rupture (H)     Pseudophakia, Yag Caps, ou       Current Behavioral Concerns: none  Education Provided to patient: [facility] benefits   Pain  Pain (GOAL)::  (unknown)  Health Maintenance Reviewed: Due/Overdue   Health Maintenance Due   Topic Date Due     HF ACTION PLAN  Never done     ZOSTER IMMUNIZATION (2 of 3) 03/02/2009     ALT  09/20/2013     LIPID  04/25/2017     ADVANCE CARE PLANNING  11/28/2017     MEDICARE ANNUAL WELLNESS VISIT  01/25/2018     BMP  05/29/2019     FALL RISK ASSESSMENT  11/29/2019     CBC  11/29/2019     PHQ-2  01/01/2021     COVID-19 Vaccine (2 - Pfizer 2-dose series) 03/05/2021     Clinical Pathway: None    Medication Management:  Medication review status: Patient declined as this was a brief call       Functional Status:  Dependent ADLs:: Independent  Dependent IADLs:: Independent  Bed or wheelchair confined:: No  Mobility Status: Independent  Fallen 2 or more times in the past year?:  (unknown)  Any fall with injury in the past year?:  (unknown)    Living Situation:  Current living arrangement:: I live in a private home with spouse  [facility]    Lifestyle & Psychosocial Needs: did not have time to fully assess as brief call     Social Determinants of Health     Tobacco Use: Medium Risk     Smoking Tobacco Use: Former Smoker     Smokeless Tobacco Use: Never Used   Alcohol Use:      Frequency of Alcohol Consumption:      Average Number of Drinks:      Frequency of Binge Drinking:    Financial Resource Strain:      Difficulty of Paying Living Expenses:    Food Insecurity:      Worried About Running Out of Food in the Last Year:      Ran Out of Food in the Last Year:     Transportation Needs:      Lack of Transportation (Medical):      Lack of Transportation (Non-Medical):    Physical Activity:      Days of Exercise per Week:      Minutes of Exercise per Session:    Stress:      Feeling of Stress :    Social Connections:      Frequency of Communication with Friends and Family:      Frequency of Social Gatherings with Friends and Family:      Attends [employer]an Services:      Active Member of Clubs or Organizations:      Attends Club or Organization Meetings:      Marital Status:    Intimate Partner Violence:      Fear of Current or Ex-Partner:      Emotionally Abused:      Physically Abused:      Sexually Abused:    Depression: Not at risk     PHQ-2 Score: 0   Housing Stability:      Unable to Pay for Housing in the Last Year:      Number of Places Lived in the Last Year:      Unstable Housing in the Last Year:      Diet:: Regular  Inadequate nutrition (GOAL):: No  Tube Feeding: No  Inadequate activity/exercise (GOAL):: No  Significant changes in sleep pattern (GOAL): No  Transportation means:: Regular car     Lutheran or spiritual beliefs that impact treatment:: No  Mental health DX:: No  Mental health management concern (GOAL):: No  Chemical Dependency Status: No Current Concerns  Chemical Dependency Management: Other (see comment) (n/a)  Informal Support system:: Family, Friends          Resources and Interventions: [facility] benefits   Current Resources: none     Community Resources: None  Supplies used at home:: None  Equipment Currently Used at Home: none  Employment Status: retired         Advance Care Plan/Directive  Advanced Care Plans/Directives on file:: No  Advanced Care Plan/Directive Status: Not Applicable    Referrals Placed: Other  (discussed VA benefit application)     Goals: Patient declined CC       Patient/Caregiver understanding: Patient declined CC            Plan:   Patient declined CC     Farhana Thomas, PRERNAW, MSW   [facility]  Coordination  [facility]  [phone]  8/2/2021 2:44 PM

## 2021-09-25 ENCOUNTER — HEALTH MAINTENANCE LETTER (OUTPATIENT)
Age: 86
End: 2021-09-25

## 2022-01-21 DIAGNOSIS — I10 BENIGN ESSENTIAL HYPERTENSION: ICD-10-CM

## 2022-01-21 DIAGNOSIS — E78.5 HYPERLIPIDEMIA WITH TARGET LDL LESS THAN 100: ICD-10-CM

## 2022-01-24 RX ORDER — METOPROLOL TARTRATE 25 MG/1
TABLET, FILM COATED ORAL
Qty: 90 TABLET | Refills: 1 | Status: SHIPPED | OUTPATIENT
Start: 2022-01-24 | End: 2022-07-29

## 2022-01-25 RX ORDER — ATORVASTATIN CALCIUM 40 MG/1
40 TABLET, FILM COATED ORAL DAILY
Qty: 90 TABLET | Refills: 0 | Status: SHIPPED | OUTPATIENT
Start: 2022-01-25 | End: 2022-08-04

## 2022-01-25 NOTE — TELEPHONE ENCOUNTER
Atorvastatin Calcium Oral Tablet 40 MG      Last Written Prescription Date:  1/19/21  Last Fill Quantity: 90,   # refills: 3  Last Office Visit : Natalia Donis NP  Interventional Cardiology  8/2/2021  [facility]  Recommended 1 year follow up  Future Office visit:  None scheduled    Routing refill request to provider for review/approval because:  Overdue for lab  Lab Test 01/25/17  0909   LDL 72     Nothing more recent in care everywhere nor media

## 2022-03-27 ASSESSMENT — ENCOUNTER SYMPTOMS
DIARRHEA: 1
FREQUENCY: 1
CONSTIPATION: 1
WEAKNESS: 1
HEMATOCHEZIA: 1

## 2022-03-27 ASSESSMENT — ACTIVITIES OF DAILY LIVING (ADL): CURRENT_FUNCTION: NO ASSISTANCE NEEDED

## 2022-03-28 ENCOUNTER — OFFICE VISIT (OUTPATIENT)
Dept: FAMILY MEDICINE | Facility: CLINIC | Age: 87
End: 2022-03-28
Payer: COMMERCIAL

## 2022-03-28 VITALS
BODY MASS INDEX: 24.92 KG/M2 | OXYGEN SATURATION: 97 % | WEIGHT: 141 LBS | HEART RATE: 69 BPM | SYSTOLIC BLOOD PRESSURE: 144 MMHG | DIASTOLIC BLOOD PRESSURE: 76 MMHG | TEMPERATURE: 97.8 F

## 2022-03-28 DIAGNOSIS — I12.0 HYPERTENSIVE CHRONIC KIDNEY DISEASE WITH STAGE 5 CHRONIC KIDNEY DISEASE OR END STAGE RENAL DISEASE (H): Primary | ICD-10-CM

## 2022-03-28 DIAGNOSIS — I10 BENIGN ESSENTIAL HYPERTENSION: ICD-10-CM

## 2022-03-28 DIAGNOSIS — Z13.6 ENCOUNTER FOR LIPID SCREENING FOR CARDIOVASCULAR DISEASE: ICD-10-CM

## 2022-03-28 DIAGNOSIS — C34.32 MALIGNANT NEOPLASM OF LOWER LOBE OF LEFT LUNG (H): ICD-10-CM

## 2022-03-28 DIAGNOSIS — I50.32 CHRONIC DIASTOLIC (CONGESTIVE) HEART FAILURE (H): ICD-10-CM

## 2022-03-28 DIAGNOSIS — H61.21 IMPACTED CERUMEN OF RIGHT EAR: ICD-10-CM

## 2022-03-28 DIAGNOSIS — I48.0 PAROXYSMAL ATRIAL FIBRILLATION (H): ICD-10-CM

## 2022-03-28 DIAGNOSIS — D50.0 IRON DEFICIENCY ANEMIA DUE TO CHRONIC BLOOD LOSS: ICD-10-CM

## 2022-03-28 DIAGNOSIS — Z00.00 ENCOUNTER FOR MEDICARE ANNUAL WELLNESS EXAM: ICD-10-CM

## 2022-03-28 DIAGNOSIS — I77.810 ASCENDING AORTA DILATATION (H): ICD-10-CM

## 2022-03-28 DIAGNOSIS — Z13.220 ENCOUNTER FOR LIPID SCREENING FOR CARDIOVASCULAR DISEASE: ICD-10-CM

## 2022-03-28 PROCEDURE — G0438 PPPS, INITIAL VISIT: HCPCS | Performed by: FAMILY MEDICINE

## 2022-03-28 PROCEDURE — 69209 REMOVE IMPACTED EAR WAX UNI: CPT | Performed by: FAMILY MEDICINE

## 2022-03-28 ASSESSMENT — ENCOUNTER SYMPTOMS
FREQUENCY: 1
WEAKNESS: 1
CONSTIPATION: 1
HEMATOCHEZIA: 1
DIARRHEA: 1

## 2022-03-28 ASSESSMENT — ACTIVITIES OF DAILY LIVING (ADL): CURRENT_FUNCTION: NO ASSISTANCE NEEDED

## 2022-03-28 NOTE — PROGRESS NOTES
"SUBJECTIVE:   Pranay Dubon is a 86 year old male who presents for Preventive Visit.  Patient has been advised of split billing requirements and indicates understanding: Yes  Are you in the first 12 months of your Medicare coverage?  No    Healthy Habits:     In general, how would you rate your overall health?  Fair    Frequency of exercise:  2-3 days/week    Duration of exercise:  15-30 minutes    Do you usually eat at least 4 servings of fruit and vegetables a day, include whole grains    & fiber and avoid regularly eating high fat or \"junk\" foods?  No    Taking medications regularly:  Yes    Medication side effects:  Muscle aches and Significant flushing    Ability to successfully perform activities of daily living:  No assistance needed    Home Safety:  No safety concerns identified    Hearing Impairment:  Difficulty following a conversation in a noisy restaurant or crowded room, feel that people are mumbling or not speaking clearly, difficult to understand a speaker at a public meeting or [employer] service, need to ask people to speak up or repeat themselves, find that men's voices are easier to understand than woman's and difficulty understanding soft or whispered speech    In the past 6 months, have you been bothered by leaking of urine? Yes    In general, how would you rate your overall mental or emotional health?  Good      PHQ-2 Total Score: 1    Additional concerns today:  Yes      BP Readings from Last 6 Encounters:   03/28/22 (!) 144/76   08/02/21 114/66   11/29/18 134/60   10/16/17 122/70   02/01/17 100/63   01/25/17 112/64       Do you feel safe in your environment? Yes    Have you ever done Advance Care Planning? (For example, a Health Directive, POLST, or a discussion with a medical provider or your loved ones about your wishes): Yes, patient states has an Advance Care Planning document and will bring a copy to the clinic.       Fall risk  Fallen 2 or more times in the past year?: No  Any fall with " injury in the past year?: No    Cognitive Screening   1) Repeat 3 items (Leader, Season, Table)    2) Clock draw: NORMAL  3) 3 item recall: Recalls 2 objects   Results: NORMAL clock, 1-2 items recalled: COGNITIVE IMPAIRMENT LESS LIKELY    Mini-CogTM Copyright SRIRAM Marinelli. Licensed by the author for use in [facility]; reprinted with permission (adia@[email or web address]). All rights reserved.      Do you have sleep apnea, excessive snoring or daytime drowsiness?: yes    Reviewed and updated as needed this visit by clinical staff   Tobacco  Allergies  Meds              Reviewed and updated as needed this visit by Provider                 Social History     Tobacco Use     Smoking status: Former Smoker     Packs/day: 1.00     Years: 30.00     Pack years: 30.00     Types: Cigarettes     Quit date: 1983     Years since quittin.6     Smokeless tobacco: Never Used   Substance Use Topics     Alcohol use: No     Alcohol/week: 0.0 standard drinks     If you drink alcohol do you typically have >3 drinks per day or >7 drinks per week? No    Alcohol Use 3/27/2022   Prescreen: >3 drinks/day or >7 drinks/week? No   Prescreen: >3 drinks/day or >7 drinks/week? -   No flowsheet data found.            Current providers sharing in care for this patient include:   Patient Care Team:  Ramone Lazaro MD as PCP - General (Family Practice)  Ricardo Bourgeois MD as MD (Cardiology)  Ramone Lazaro MD as Assigned PCP  Kaushik Salazar MD as Assigned Surgical Provider  Natalia Donis NP as Assigned Heart and Vascular Provider    The following health maintenance items are reviewed in Epic and correct as of today:  Health Maintenance Due   Topic Date Due     HF ACTION PLAN  Never done     MICROALBUMIN  Never done     ANNUAL REVIEW OF HM ORDERS  Never done     URINALYSIS  Never done     ALT  2013     LIPID  2017     ADVANCE CARE PLANNING  2017     MEDICARE ANNUAL WELLNESS VISIT   "01/25/2018     BMP  05/29/2019     FALL RISK ASSESSMENT  11/29/2019     CBC  11/29/2019     HEMOGLOBIN  11/29/2019     EYE EXAM  03/22/2022     BP Readings from Last 3 Encounters:   03/28/22 (!) 144/76   08/02/21 114/66   11/29/18 134/60    Wt Readings from Last 3 Encounters:   03/28/22 64 kg (141 lb)   08/02/21 59.4 kg (131 lb)   11/29/18 76.2 kg (168 lb)                          Review of Systems   HENT: Positive for hearing loss.    Gastrointestinal: Positive for constipation, diarrhea and hematochezia.   Genitourinary: Positive for frequency, impotence and urgency.   Neurological: Positive for weakness.         OBJECTIVE:   BP (!) 144/76   Pulse 69   Temp 97.8  F (36.6  C) (Oral)   Wt 64 kg (141 lb)   SpO2 97%   BMI 24.92 kg/m   Estimated body mass index is 24.92 kg/m  as calculated from the following:    Height as of 11/29/18: 1.602 m (5' 3.07\").    Weight as of this encounter: 64 kg (141 lb).  Physical Exam  GENERAL: healthy, alert and no distress  EYES: Eyes grossly normal to inspection, PERRL and conjunctivae and sclerae normal  HENT: bilateral cerumen impcation and TM's normal, nose and mouth without ulcers or lesions  NECK: no adenopathy, no asymmetry, masses, or scars and thyroid normal to palpation  RESP: lungs clear to auscultation - no rales, rhonchi or wheezes  CV: regular rate and rhythm, normal S1 S2, no S3 or S4, no murmur, click or rub, no peripheral edema and peripheral pulses strong  ABDOMEN: soft, nontender, no hepatosplenomegaly, no masses and bowel sounds normal  MS: no gross musculoskeletal defects noted, no edema  SKIN: no suspicious lesions or rashes  NEURO: Normal strength and tone, mentation intact and speech normal  PSYCH: mentation appears normal, affect normal/bright        ASSESSMENT / PLAN:       ICD-10-CM    1. 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 Benign essential hypertension  I10 CANCELED: Albumin " "Random Urine Quantitative with Creat Ratio   3. Chronic diastolic (congestive) heart failure (H)  I50.32 CANCELED: Comprehensive metabolic panel   4. Iron deficiency anemia due to chronic blood loss  D50.0 CANCELED: CBC with Platelets   5. Malignant neoplasm of lower lobe of left lung (H)  C34.32    6. Ascending aorta dilatation (H)  I77.810    7. Paroxysmal atrial fibrillation (H)  I48.0 CANCELED: Comprehensive metabolic panel   8. Encounter for Medicare annual wellness exam  Z00.00 REVIEW OF HEALTH MAINTENANCE PROTOCOL ORDERS     OPTOMETRY REFERRAL     CANCELED: Albumin Random Urine Quantitative with Creat Ratio     CANCELED: CBC with Platelets     CANCELED: Comprehensive metabolic panel     CANCELED: Lipid panel reflex to direct LDL Non-fasting   9. Encounter for lipid screening for cardiovascular disease  Z13.220 CANCELED: Lipid panel reflex to direct LDL Non-fasting    Z13.6    10. Impacted cerumen of right ear  H61.21 REMOVE IMPACTED CERUMEN    Cerumen removed with ear flush by MA without complication.      Patient has been advised of split billing requirements and indicates understanding: Yes    COUNSELING:  Reviewed preventive health counseling, as reflected in patient instructions       Regular exercise       Healthy diet/nutrition       Vision screening    Estimated body mass index is 24.92 kg/m  as calculated from the following:    Height as of 11/29/18: 1.602 m (5' 3.07\").    Weight as of this encounter: 64 kg (141 lb).        He reports that he quit smoking about 38 years ago. His smoking use included cigarettes. He has a 30.00 pack-year smoking history. He has never used smokeless tobacco.      Appropriate preventive services were discussed with this patient, including applicable screening as appropriate for cardiovascular disease, diabetes, osteopenia/osteoporosis, and glaucoma.  As appropriate for age/gender, discussed screening for colorectal cancer, prostate cancer, breast cancer, and cervical " cancer. Checklist reviewing preventive services available has been given to the patient.    Reviewed patients plan of care and provided an AVS. The Basic Care Plan (routine screening as documented in Health Maintenance) for Pranay meets the Care Plan requirement. This Care Plan has been established and reviewed with the Patient.    Counseling Resources:  ATP IV Guidelines  Pooled Cohorts Equation Calculator  Breast Cancer Risk Calculator  Breast Cancer: Medication to Reduce Risk  FRAX Risk Assessment  ICSI Preventive Guidelines  Dietary Guidelines for Americans, 2010  [employer] MyPlate  ASA Prophylaxis  Lung CA Screening    Ramone Chávez MD  [facility]    Identified Health Risks:    The patient was provided with suggestions to help him develop a healthy physical lifestyle.  The patient was counseled and encouraged to consider modifying their diet and eating habits. He was provided with information on recommended healthy diet options.  The patient was provided with written information regarding signs of hearing loss.  Information on urinary incontinence and treatment options given to patient.

## 2022-03-28 NOTE — PATIENT INSTRUCTIONS
Patient Education   Personalized Prevention Plan  You are due for the preventive services outlined below.  Your care team is available to assist you in scheduling these services.  If you have already completed any of these items, please share that information with your care team to update in your medical record.  Health Maintenance Due   Topic Date Due     Heart Failure Action Plan  Never done     Kidney Microalbumin Urine Test  Never done     Parathyroid Lab  Never done     Phosphorous Lab  Never done     ANNUAL REVIEW OF HM ORDERS  Never done     Urine Test  Never done     Alk Phos  Never done     Liver Monitoring Lab  09/20/2013     Cholesterol Lab  04/25/2017     Basic Metabolic Panel  02/28/2019     Hemoglobin  05/29/2019     FALL RISK ASSESSMENT  11/29/2019     Complete Blood Count  11/29/2019     Eye Exam  03/22/2022     Your Health Risk Assessment indicates you feel you are not in good health    A healthy lifestyle helps keep the body fit and the mind alert. It helps protect you from disease, helps you fight disease, and helps prevent chronic disease (disease that doesn't go away) from getting worse. This is important as you get older and begin to notice twinges in muscles and joints and a decline in the strength and stamina you once took for granted. A healthy lifestyle includes good healthcare, good nutrition, weight control, recreation, and regular exercise. Avoid harmful substances and do what you can to keep safe. Another part of a healthy lifestyle is stay mentally active and socially involved.    Good healthcare     Have a wellness visit every year.     If you have new symptoms, let us know right away. Don't wait until the next checkup.     Take medicines exactly as prescribed and keep your medicines in a safe place. Tell us if your medicine causes problems.   Healthy diet and weight control     Eat 3 or 4 small, nutritious, low-fat, high-fiber meals a day. Include a variety of fruits, vegetables,  and whole-grain foods.     Make sure you get enough calcium in your diet. Calcium, vitamin D, and exercise help prevent osteoporosis (bone thinning).     If you live alone, try eating with others when you can. That way you get a good meal and have company while you eat it.     Try to keep a healthy weight. If you eat more calories than your body uses for energy, it will be stored as fat and you will gain weight.     Recreation   Recreation is not limited to sports and team events. It includes any activity that provides relaxation, interest, enjoyment, and exercise. Recreation provides an outlet for physical, mental, and social energy. It can give a sense of worth and achievement. It can help you stay healthy.    Mental Exercise and Social Involvement  Mental and emotional health is as important as physical health. Keep in touch with friends and family. Stay as active as possible. Continue to learn and challenge yourself.   Things you can do to stay mentally active are:    Learn something new, like a foreign language or musical instrument.     Play SCRABBLE or do crossword puzzles. If you cannot find people to play these games with you at home, you can play them with others on your computer through the Internet.     Join a games club--anything from card games to chess or checkers or lawn bowling.     Start a new hobby.     Go back to school.     Volunteer.     Read.   Keep up with world events.    Understanding USDA MyPlate  The USDA has guidelines to help you make healthy food choices. These are called MyPlate. MyPlate shows the food groups that make up healthy meals using the image of a place setting. Before you eat, think about the healthiest choices for what to put on your plate or in your cup or bowl. To learn more about building a healthy plate, visit www.choosemyplate.gov.    The food groups    Fruits. Any fruit or 100% fruit juice counts as part of the Fruit Group. Fruits may be fresh, canned, frozen, or  dried, and may be whole, cut-up, or pureed. Make 1/2 of your plate fruits and vegetables.    Vegetables. Any vegetable or 100% vegetable juice counts as a member of the Vegetable Group. Vegetables may be fresh, frozen, canned, or dried. They can be served raw or cooked and may be whole, cut-up, or mashed. Make 1/2 of your plate fruits and vegetables.    Grains. All foods made from grains are part of the Grains Group. These include wheat, rice, oats, cornmeal, and barley. Grains are often used to make foods such as bread, pasta, oatmeal, cereal, tortillas, and grits. Grains should be no more than 1/4 of your plate. At least half of your grains should be whole grains.    Protein. This group includes meat, poultry, seafood, beans and peas, eggs, processed soy products (such as tofu), nuts (including nut butters), and seeds. Make protein choices no more than 1/4 of your plate. Meat and poultry choices should be lean or low fat.    Dairy. The Dairy Group includes all fluid milk products and foods made from milk that contain calcium, such as yogurt and cheese. (Foods that have little calcium, such as cream, butter, and cream cheese, are not part of this group.) Most dairy choices should be low-fat or fat-free.    Oils. Oils aren't a food group, but they do contain essential nutrients. However it's important to watch your intake of oils. These are fats that are liquid at room temperature. They include canola, corn, olive, soybean, vegetable, and sunflower oil. Foods that are mainly oil include mayonnaise, certain salad dressings, and soft margarines. You likely already get your daily oil allowance from the foods you eat.  Things to limit  Eating healthy also means limiting these things in your diet:       Salt (sodium). Many processed foods have a lot of sodium. To keep sodium intake down, eat fresh vegetables, meats, poultry, and seafood when possible. Purchase low-sodium, reduced-sodium, or no-salt-added food products at  the store. And don't add salt to your meals at home. Instead, season them with herbs and spices such as dill, oregano, cumin, and paprika. Or try adding flavor with lemon or lime zest and juice.    Saturated fat. Saturated fats are most often found in animal products such as beef, pork, and chicken. They are often solid at room temperature, such as butter. To reduce your saturated fat intake, choose leaner cuts of meat and poultry. And try healthier cooking methods such as grilling, broiling, roasting, or baking. For a simple lower-fat swap, use plain nonfat yogurt instead of mayonnaise when making potato salad or macaroni salad.    Added sugars. These are sugars added to foods. They are in foods such as ice cream, candy, soda, fruit drinks, sports drinks, energy drinks, cookies, pastries, jams, and syrups. Cut down on added sugars by sharing sweet treats with a family member or friend. You can also choose fruit for dessert, and drink water or other unsweetened beverages.     [employer] last reviewed this educational content on 6/1/2020 2000-2021 [employer]. All rights reserved. This information is not intended as a substitute for professional medical care. Always follow your healthcare professional's instructions.          Signs of Hearing Loss      Hearing much better with one ear can be a sign of hearing loss.   Hearing loss is a problem shared by many people. In fact, it is one of the most common health problems, particularly as people age. Most people age 65 and older have some hearing loss. By age 80, almost everyone does. Hearing loss often occurs slowly over the years. So you may not realize your hearing has gotten worse.  Have your hearing checked  Call your healthcare provider if you:    Have to strain to hear normal conversation    Have to watch other people s faces very carefully to follow what they re saying    Need to ask people to repeat what they ve said    Often misunderstand what  people are saying    Turn the volume of the television or radio up so high that others complain    Feel that people are mumbling when they re talking to you    Find that the effort to hear leaves you feeling tired and irritated    Notice, when using the phone, that you hear better with one ear than the other  [employer] last reviewed this educational content on 1/1/2020 2000-2021 [employer]. All rights reserved. This information is not intended as a substitute for professional medical care. Always follow your healthcare professional's instructions.          Urinary Incontinence (Male)    Urinary incontinence means not being able to control the release of urine from the bladder.   Causes  Common causes of urinary incontinence in men include:    Infection    Certain medicines    Aging    Poor pelvic muscle tone    Bladder spasms    Obesity    Trouble urinating and fully emptying the bladder (urinary retention)  Other things that can cause incontinence are:     Nervous system diseases    Diabetes    Sleep apnea    Urinary tract infections    Prostate surgery    Pelvic injury  Constipation and smoking have also been identified as risk factors.   Symptoms    Urge incontinence (overactive bladder). This is a sudden urge to urinate. It occurs even though there may not be much urine in the bladder. The need to urinate often during the night is common. It's due to bladder spasms.    Stress incontinence. This is urine leakage that you can't control. It can occur with sneezing, coughing, and other actions that put stress on the bladder.    Treatment  Treatment depends on what is causing the condition. Bladder infections are treated with antibiotics. Urinary retention is treated with a bladder catheter.   Home care  Follow these guidelines when caring for yourself at home:    Don't have any foods and drinks that may irritate the bladder. This includes:  ? Chocolate  ? Alcohol  ? Caffeine  ? Carbonated  drinks  ? Acidic fruits and juices    Limit fluids to 6 to 8 cups a day.    Lose weight if you are overweight. This will reduce your symptoms.    If advised, do regular pelvic muscle-strengthening exercises such as Kegel exercises.    If needed, wear absorbent pads to catch urine. Change the pads often. This is for good hygiene and to prevent skin and bladder infections.    Bathe daily for good hygiene.    If an antibiotic was prescribed to treat a bladder infection, take it until it's finished. Keep taking it even if you are feeling better. This is to make sure your infection has cleared.    If a catheter was left in place, keep bacteria from getting into the collection bag. Don't disconnect the catheter from the collection bag.    Use a leg band to secure the catheter drainage tube, so it does not pull on the catheter. Drain the collection bag when it becomes full. To do this, use the drain spout at the bottom of the bag. Don't disconnect the bag from the catheter.    Don't pull on or try to remove a catheter. The catheter must be removed by a healthcare provider.    If you smoke, stop. Ask your provider for help if you can't do this on your own.  Follow-up care  Follow up with your healthcare provider, or as advised.  When to get medical advice  Call your healthcare provider right away if any of these occur:    Fever over 100.4 F (38 C), or as directed by your provider    Bladder pain or fullness    Belly swelling, nausea, or vomiting    Back pain    Weakness, dizziness, or fainting    If a catheter was left in place, return if:  ? The catheter falls out  ? The catheter stops draining for 6 hours  ? Your urine gets cloudy or smells bad  [employer] last reviewed this educational content on 1/1/2020 2000-2021 [employer]. All rights reserved. This information is not intended as a substitute for professional medical care. Always follow your healthcare professional's instructions.

## 2022-07-28 DIAGNOSIS — I10 BENIGN ESSENTIAL HYPERTENSION: ICD-10-CM

## 2022-07-28 DIAGNOSIS — E78.5 HYPERLIPIDEMIA WITH TARGET LDL LESS THAN 100: ICD-10-CM

## 2022-07-29 RX ORDER — METOPROLOL TARTRATE 25 MG/1
25 TABLET, FILM COATED ORAL DAILY
Qty: 90 TABLET | Refills: 0 | Status: SHIPPED | OUTPATIENT
Start: 2022-07-29 | End: 2022-08-04

## 2022-07-29 NOTE — TELEPHONE ENCOUNTER
Atorvastatin Calcium Oral Tablet 40 MG      Last Written Prescription Date:  1/25/22  Last Fill Quantity: 90,   # refills: 0  Last Office Visit : Natalia Donis NP  Interventional Cardiology  8/2/2021  [facility]  Recommended 1 year follow up  Future Office visit:  None scheduled    Routing refill request to provider for review/approval because:  Overdue for LDL  Lab Test 01/25/17  0909   LDL 72     Last received 90 day hever refill  Nothing more recent in care everywhere nor media  Future orders in queue  Gap in therapy?  Taking?    Metoprolol Tartrate Oral Tablet 25 MG  Last Written Prescription Date:  1/24/22  Last Fill Quantity: 90,   # refills: 1  Last Office Visit : Natalia Donis NP  Interventional Cardiology  8/2/2021  [facility]  Recommended 1 year follow up  Future Office visit:  None scheduled    Routing refill request to provider for review/approval because:  Blood pressure out of range   BP Readings from Last 3 Encounters:   03/28/22 (!) 144/76   08/02/21 114/66   11/29/18 134/60     90 day refill provided routed to cardiology

## 2022-08-01 DIAGNOSIS — E78.5 HYPERLIPIDEMIA WITH TARGET LDL LESS THAN 100: ICD-10-CM

## 2022-08-01 RX ORDER — ATORVASTATIN CALCIUM 40 MG/1
40 TABLET, FILM COATED ORAL DAILY
OUTPATIENT
Start: 2022-08-01

## 2022-08-02 NOTE — TELEPHONE ENCOUNTER
The patient called and scheduled an appt with Dr Bourgeois for 8/17 but still needs to schedule an Echo    PT asked to refill by tomorrow please

## 2022-08-03 NOTE — TELEPHONE ENCOUNTER
Atorvastatin Calcium Oral Tablet 40 MG      Last Written Prescription Date:  1/25/22  Last Fill Quantity: 90,   # refills: 0  Last Office Visit : Natalia Donis NP  Interventional Cardiology  8/2/2021  [facility]  Future Office visit:  8/17/22 at [place]    Routing refill request to provider for review/approval because:  Overdue for LDL  Lab Test 01/25/17  0909   LDL 72     Nothing more recent in care everywhere nor media  Future order in queue

## 2022-08-04 ENCOUNTER — MYC MEDICAL ADVICE (OUTPATIENT)
Dept: FAMILY MEDICINE | Facility: CLINIC | Age: 87
End: 2022-08-04

## 2022-08-04 ENCOUNTER — MYC MEDICAL ADVICE (OUTPATIENT)
Dept: CARDIOLOGY | Facility: CLINIC | Age: 87
End: 2022-08-04

## 2022-08-04 DIAGNOSIS — I10 BENIGN ESSENTIAL HYPERTENSION: ICD-10-CM

## 2022-08-04 DIAGNOSIS — E78.5 HYPERLIPIDEMIA WITH TARGET LDL LESS THAN 100: ICD-10-CM

## 2022-08-04 RX ORDER — ATORVASTATIN CALCIUM 40 MG/1
40 TABLET, FILM COATED ORAL DAILY
Qty: 90 TABLET | Refills: 0 | Status: SHIPPED | OUTPATIENT
Start: 2022-08-04 | End: 2022-11-07

## 2022-08-04 RX ORDER — METOPROLOL TARTRATE 25 MG/1
25 TABLET, FILM COATED ORAL DAILY
Qty: 90 TABLET | Refills: 0 | Status: SHIPPED | OUTPATIENT
Start: 2022-08-04 | End: 2022-11-02

## 2022-08-04 RX ORDER — ATORVASTATIN CALCIUM 40 MG/1
40 TABLET, FILM COATED ORAL DAILY
Qty: 90 TABLET | Refills: 0 | Status: SHIPPED | OUTPATIENT
Start: 2022-08-04 | End: 2022-08-04

## 2022-08-04 NOTE — TELEPHONE ENCOUNTER
Pt was told no more refills until he has appt with Dr. Bourgeois. Confirmed Dr. Bourgeois appt on 8/17. Medication refilled for 90 days.     Pao Menezes RN

## 2022-08-04 NOTE — TELEPHONE ENCOUNTER
Cards Refills [facility],    Please see mychart with refill requests.     Thanks,  MARY Perez  [facility]

## 2022-08-05 RX ORDER — ATORVASTATIN CALCIUM 40 MG/1
40 TABLET, FILM COATED ORAL DAILY
OUTPATIENT
Start: 2022-08-05

## 2022-08-15 ENCOUNTER — ANCILLARY PROCEDURE (OUTPATIENT)
Dept: CARDIOLOGY | Facility: CLINIC | Age: 87
End: 2022-08-15
Attending: NURSE PRACTITIONER
Payer: COMMERCIAL

## 2022-08-15 DIAGNOSIS — I25.10 CORONARY ARTERY DISEASE INVOLVING NATIVE CORONARY ARTERY OF NATIVE HEART WITHOUT ANGINA PECTORIS: ICD-10-CM

## 2022-08-15 DIAGNOSIS — I77.810 ASCENDING AORTA DILATATION (H): ICD-10-CM

## 2022-08-15 LAB — LVEF ECHO: NORMAL

## 2022-08-15 PROCEDURE — 93306 TTE W/DOPPLER COMPLETE: CPT | Performed by: INTERNAL MEDICINE

## 2022-08-17 ENCOUNTER — OFFICE VISIT (OUTPATIENT)
Dept: CARDIOLOGY | Facility: CLINIC | Age: 87
End: 2022-08-17
Payer: COMMERCIAL

## 2022-08-17 VITALS
OXYGEN SATURATION: 97 % | WEIGHT: 134 LBS | BODY MASS INDEX: 23.68 KG/M2 | HEART RATE: 66 BPM | DIASTOLIC BLOOD PRESSURE: 66 MMHG | SYSTOLIC BLOOD PRESSURE: 109 MMHG

## 2022-08-17 DIAGNOSIS — I25.10 CORONARY ARTERY DISEASE INVOLVING NATIVE CORONARY ARTERY OF NATIVE HEART WITHOUT ANGINA PECTORIS: Primary | ICD-10-CM

## 2022-08-17 DIAGNOSIS — Z98.890 HISTORY OF ABDOMINAL AORTIC ANEURYSM (AAA) REPAIR: ICD-10-CM

## 2022-08-17 DIAGNOSIS — D50.9 IRON DEFICIENCY ANEMIA, UNSPECIFIED IRON DEFICIENCY ANEMIA TYPE: ICD-10-CM

## 2022-08-17 PROCEDURE — 99213 OFFICE O/P EST LOW 20 MIN: CPT | Performed by: INTERNAL MEDICINE

## 2022-08-17 NOTE — PROGRESS NOTES
HPI: Mr. Pranay Dubon is a 84 year old  male with PMH significant for chronic iron deficiency anemia, heart failure with preserved ejection fraction, hypertension, paroxysmal atrial fibrillation on aspirin only, AAA repair in 11/2018 and CAD S/P PCI in 1985×4 (no coronary angiogram since then), and small bowel obstruction requiring abdominal surgery.    Mr. Lindsey is a new patient to me.  All his prior cardiac care was at [facility].  Patient was last seen in cardiology clinic at [facility] a year ago.  He is establishing care with us today.  He has recently seen Dr. Lazaro and he is referred to cardiology due to his history of AAA surgery and paroxysmal atrial fibrillation.      Patient was recently admitted to [facility] on 6/11/2020 for epigastric and chest discomfort.  Patient's symptoms apparently started after working in his backyard.  He stayed at the hospital for 3 days.  He underwent thorough evaluation including CT of the chest which showed no evidence of dissection. CT of the abdomen pelvis was obtained which was negative for evidence of malignancy though was concerning for cholecystitis.  Patient was found to have significant leukocytosis on admission.  On hospital day 1 his white count yaakov to 32K.  He reported resolution of chest and back pain.  Serial troponins were negative.  Patient underwent HIDA scan which showed patent bile ducts. He was deemed to be dehydrated.  No medication change was recommended.      Since discharge the patient reports having balance issues.  When he tries to move fast he feels like he is losing balance.  Denies chest pain, syncope, lower extremity edema.  He tells me that he cannot gain weight.  His appetite is good.  Patient also tells me that he has chronic diarrhea since small bowel surgery in January 2019.  He tells me that he is almost semi-continent.  He checks his pulse and finds his pulse around 50s.  Blood pressure 100/59 mmHg.  Patient again tells  me that he has never felt atrial fibrillation.  He is aware of only one episode of A. fib when he underwent AAA surgery in 2018 ([facility]) while he was in the hospital.  He denies recurrence since then.  Patient's EKG shows sinus rhythm during his most recent admission as well.  Reviewing patient's prior cardiology reports I have seen that he was noted to have recurrent epistaxis when he was on warfarin. Eventually he was taken off of warfarin due to GI bleeding of unclear source.  His hemoglobin was as low as 6 at some point.  He is maintaining stable hemoglobin now iron replacement.    [facility].    Patient has 30-pack-year history of smoking.  Quit date 1983.  Patient was admitted to [facility] December 2019 with small bowel obstruction complicated by septic shock from bacteremia.  He underwent exploratory laparotomy with lysis of adhesions.  His hospital course was complicated by SHANTEL requiring temporary CRRT.    Echocardiogram a year ago ([facility]) showed normal biventricular function with grade 1 LV diastolic dysfunction.  No significant valve disease was noted.     Medications, personal, family, and social history reviewed with patient and revised.    Interval history 8/17/2022:  Patient is being seen today for follow-up.  Last time I saw him 2 years ago.  He is overall feeling well from cardiac standpoint.  No chest pain, shortness of breath, palpitations, dizziness, syncope or lower extremity edema.  [facility] He [facility] and he tells me that [facility] are trying to figure out his diarrhea and constipation problem.  His wife is accompanying him today.  They walk outside.  He can cut the grass.    PAST MEDICAL HISTORY:  Past Medical History:   Diagnosis Date     Atrial fibrillation (H)      BPH (benign prostatic hyperplasia)      CAD (coronary artery disease)     4 stents     ED (erectile dysfunction)      GERD  (gastroesophageal reflux disease)      Hypercholesterolemia      Hypertension goal BP (blood pressure) < 140/90      Lumbar stenosis      Microscopic hematuria     normal urologic evaluation      Nonsenile cataract      Obesity      SNHL (sensorineural hearing loss)      Type 2 diabetes mellitus without complications (H)        CURRENT MEDICATIONS:  Current Outpatient Medications   Medication Sig Dispense Refill     aspirin (ASA) 325 MG EC tablet Take 325 mg by mouth daily       atorvastatin (LIPITOR) 40 MG tablet Take 1 tablet (40 mg) by mouth daily - must complete blood draw at [facility] lab for lipid lab (fast for 12 hrs prior) call to schedule : [phone] 90 tablet 0     metoprolol tartrate (LOPRESSOR) 25 MG tablet Take 1 tablet (25 mg) by mouth daily 90 tablet 0     multivitamin w/minerals (THERA-VIT-M) tablet Take 2 tablets by mouth daily        Nitroglycerin (NITROSTAT SL) Place 0.4 mg under the tongue       omeprazole (PRILOSEC) 20 MG DR capsule Take 20 mg by mouth as needed       sildenafil (VIAGRA) 25 MG tablet Take 25 mg by mouth as needed. As needed       tamsulosin (FLOMAX) 0.4 MG capsule          PAST SURGICAL HISTORY:  Past Surgical History:   Procedure Laterality Date     ANGIOPLASTY  1992    x 4     CATARACT IOL, RT/LT      [facility] about 2018-9     LASER YAG CAPSULOTOMY Right 03/22/2021     LASER YAG CAPSULOTOMY Left 04/01/2021     OPEN REDUCTION INTERNAL FIXATION ANKLE Right 1978     REPAIR ANEURYSM ABDOMINAL AORTA  11/09/2018    [facility]        ALLERGIES:   No Known Allergies    FAMILY HISTORY:  Family History   Problem Relation Age of Onset     Diabetes Mother      Heart Disease Mother         CHF     Cerebrovascular Disease Mother      Macular Degeneration Mother      Diabetes Sister      Cerebrovascular Disease Sister      Heart Disease Brother         CHF     Cerebrovascular Disease Brother      Heart Disease Sister         CHF     Cancer  No family hx of      Hypertension No family hx of      Thyroid Disease No family hx of      Glaucoma No family hx of          SOCIAL HISTORY:  Social History     Tobacco Use     Smoking status: Former Smoker     Packs/day: 1.00     Years: 30.00     Pack years: 30.00     Types: Cigarettes     Quit date: 1983     Years since quittin.0     Smokeless tobacco: Never Used   Substance Use Topics     Alcohol use: No     Alcohol/week: 0.0 standard drinks     Drug use: No       ROS:   Constitutional: No fever, chills, or sweats. Weight stable.   Cardiovascular: As per HPI.       Exam:  /66 (BP Location: Right arm, Patient Position: Chair, Cuff Size: Adult Regular)   Pulse 66   Wt 60.8 kg (134 lb)   SpO2 97%   BMI 23.68 kg/m    GENERAL APPEARANCE: alert and no distress  HEENT: no icterus, no central cyanosis  LYMPH/NECK: no adenopathy, no asymmetry, JVP not elevated  RESPIRATORY: lungs clear to auscultation - no rales, rhonchi or wheezes, no use of accessory muscles, no retractions, respirations are unlabored, normal respiratory rate  CARDIOVASCULAR: regular rhythm, normal S1, S2, no S3 or S4 and no murmur, click or rub, precordium quiet with normal PMI.  EXTREMITIES: no edema  NEURO: alert, normal speech,and affect  SKIN: no ecchymoses, no rashes     I have reviewed the labs and personally reviewed the imaging below and made my comment in the assessment and plan.    Labs:  CBC RESULTS:   Lab Results   Component Value Date    WBC 9.0 2018    RBC 4.23 (L) 2018    HGB 12.3 (L) 2018    HCT 38.4 (L) 2018    MCV 91 2018    MCH 29.1 2018    MCHC 32.0 2018    RDW 15.5 (H) 2018     2018       BMP RESULTS:  Lab Results   Component Value Date     2018    POTASSIUM 4.2 2018    CHLORIDE 104 2018    CO2 29 2018    ANIONGAP 5 2018    GLC 93 2018    BUN 19 2018    CR 1.03 2018    GFRESTIMATED 69 2018     GFRESTBLACK 84 11/29/2018    YUNIOR 9.1 11/29/2018     TTE 8/15/2022  Technically difficult study.Extremely poor acoustic windows.  Global and regional left ventricular function is normal with an EF of 55-60%.  Right ventricular function, chamber size, wall motion, and thickness are  normal.  Pulmonary artery systolic pressure cannot be assessed.  Sinuses of Valsalva 3.9 cm.  Aortic root index 24.3mm/m2,mild dilation for BSA of 1.6m2.  The inferior vena cava is normal.  No pericardial effusion is present.  There is no prior study for direct comparison.    Chest CT 6/11/2020 [facility]:     1.  New irregular 22 x 8 mm subpleural right lower lobe pulmonary nodule with  adjacent smaller nodularity. This is suspicious for pulmonary malignancy  although the possibility of infection is not excluded. This is atypical for  COVID 19 pneumonia. See follow-up recommendation below.  2.  Stable groundglass nodule in the left lower lobe.    Echocardiogram 1/13/2019:   1. Mildly enlarged left atrium.   2. Grade 1 pattern of LV diastolic filling.   3. Normal left ventricular size, borderline wall thickness, normal global systolic function, calculated EF of 68 %.   4. Right ventricular cavity size is normal, global systolic RV function is normal.   5. The aortic valve is normal and trileaflet, no stenosis and mild regurgitation.   6. The mitral valve is sclerotic, mild mitral regurgitation.   7. 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   8. No pericardial effusion    EKG 6/12/2020 [facility] sinus rhythm with PACs.    Assessment and Plan:   Mr. Pranay Dubon is a 84 year old  male with PMH significant for chronic iron deficiency anemia,  hypertension, AAA repair in 11/2018 and CAD S/P PCI in 1985×4 (no coronary angiogram since then), and small bowel obstruction requiring abdominal surgery.    Patient follows with me today with regards to his prior history of CAD.  He is overall asymptomatic from cardiac  standpoint.  He is on aspirin and metoprolol.  He had echocardiogram 2 days ago which showed normal biventricular function with no significant valve disease.  Physical exam is unremarkable.  Vitals are normal.  I recommend to continue current medication and follow-up with me as needed. [facility] AAA [facility] and GI symptoms.    Return to clinic as needed.    Total time spent 20 minutes including face-to-face clinic visit, review of labs/tests and medical documentation.    Please donot hesitate to contact me if you have any questions or concerns. Again, thank you for allowing me to participate in the care of your patient.    Ricardo GROSSMAN MD  [facility] Division of Cardiology  Pager [phone]

## 2022-08-17 NOTE — PATIENT INSTRUCTIONS
Thank you for coming to the [facility] @ [facility]; please note the following instructions:    1. Follow up as needed in cardiology        If you have any questions regarding your visit please contact your care team:     Cardiology  Telephone Number   Dee Dee TAYLOR, RN  Evelyn POE, RN   Radhika CHACON, PRO GOTTLIEB, PRO ARMENDARIZ, Visit Facilitator   [phone] (option 1)   For scheduling appts:     [phone] (select option 1)       For the Device Clinic (Pacemakers and ICD's)  RN's :  Naty Ohara   During business hours: [phone]    *After business hours:  [phone] (select option 4)      Normal test result notifications will be released via [employer] or mailed within 7 business days.  All other test results, will be communicated via telephone once reviewed by your cardiologist.    If you need a medication refill please contact your pharmacy.  Please allow 3 business days for your refill to be completed.    As always, thank you for trusting us with your health care needs!

## 2022-08-17 NOTE — LETTER
8/17/2022       RE: Pranay Dubon  [address] [facility] [address]     Dear Colleague,    Thank you for referring your patient, Pranay Dubon, to the [facility] at [facility]. Please see a copy of my visit note below.    HPI: Mr. Pranay Dubon is a 84 year old  male with PMH significant for chronic iron deficiency anemia, heart failure with preserved ejection fraction, hypertension, paroxysmal atrial fibrillation on aspirin only, AAA repair in 11/2018 and CAD S/P PCI in 1985×4 (no coronary angiogram since then), and small bowel obstruction requiring abdominal surgery.    Mr. Lindsey is a new patient to me.  All his prior cardiac care was at [facility].  Patient was last seen in cardiology clinic at [facility] a year ago.  He is establishing care with us today.  He has recently seen Dr. Lazaro and he is referred to cardiology due to his history of AAA surgery and paroxysmal atrial fibrillation.      Patient was recently admitted to [facility] on 6/11/2020 for epigastric and chest discomfort.  Patient's symptoms apparently started after working in his backyard.  He stayed at the hospital for 3 days.  He underwent thorough evaluation including CT of the chest which showed no evidence of dissection. CT of the abdomen pelvis was obtained which was negative for evidence of malignancy though was concerning for cholecystitis.  Patient was found to have significant leukocytosis on admission.  On hospital day 1 his white count yaakov to 32K.  He reported resolution of chest and back pain.  Serial troponins were negative.  Patient underwent HIDA scan which showed patent bile ducts. He was deemed to be dehydrated.  No medication change was recommended.      Since discharge the patient reports having balance issues.  When he tries to move fast he feels like he is losing balance.  Denies chest pain, syncope, lower extremity  edema.  He tells me that he cannot gain weight.  His appetite is good.  Patient also tells me that he has chronic diarrhea since small bowel surgery in January 2019.  He tells me that he is almost semi-continent.  He checks his pulse and finds his pulse around 50s.  Blood pressure 100/59 mmHg.  Patient again tells me that he has never felt atrial fibrillation.  He is aware of only one episode of A. fib when [facility].  He denies recurrence since then.  Patient's EKG shows sinus rhythm during his most recent admission as well.  Reviewing patient's prior cardiology reports I have seen that he was noted to have recurrent epistaxis when he was on warfarin. Eventually he was taken off of warfarin due to GI bleeding of unclear source.  His hemoglobin was as low as 6 at some point.  He is maintaining stable hemoglobin now iron replacement.    [facility].    Patient has 30-pack-year history of smoking.  Quit date 1983.  Patient was admitted to [facility] December 2019 with small bowel obstruction complicated by septic shock from bacteremia.  He underwent exploratory laparotomy with lysis of adhesions.  His hospital course was complicated by SHANTEL requiring temporary CRRT.    Echocardiogram a year ago ([facility]) showed normal biventricular function with grade 1 LV diastolic dysfunction.  No significant valve disease was noted.     Medications, personal, family, and social history reviewed with patient and revised.    Interval history 8/17/2022:  Patient is being seen today for follow-up.  Last time I saw him 2 years ago.  He is overall feeling well from cardiac standpoint.  No chest pain, shortness of breath, palpitations, dizziness, syncope or lower extremity edema.  [facility] He had abdominal surgery and he tells me that [facility] are trying to figure out his diarrhea and  constipation problem.  His wife is accompanying him today.  They walk outside.  He can cut the grass.    PAST MEDICAL HISTORY:  Past Medical History:   Diagnosis Date     Atrial fibrillation (H)      BPH (benign prostatic hyperplasia)      CAD (coronary artery disease)     4 stents     ED (erectile dysfunction)      GERD (gastroesophageal reflux disease)      Hypercholesterolemia      Hypertension goal BP (blood pressure) < 140/90      Lumbar stenosis      Microscopic hematuria     normal urologic evaluation      Nonsenile cataract      Obesity      SNHL (sensorineural hearing loss)      Type 2 diabetes mellitus without complications (H)        CURRENT MEDICATIONS:  Current Outpatient Medications   Medication Sig Dispense Refill     aspirin (ASA) 325 MG EC tablet Take 325 mg by mouth daily       atorvastatin (LIPITOR) 40 MG tablet Take 1 tablet (40 mg) by mouth daily - must complete blood draw at [facility] lab for lipid lab (fast for 12 hrs prior) call to schedule : [phone] 90 tablet 0     metoprolol tartrate (LOPRESSOR) 25 MG tablet Take 1 tablet (25 mg) by mouth daily 90 tablet 0     multivitamin w/minerals (THERA-VIT-M) tablet Take 2 tablets by mouth daily        Nitroglycerin (NITROSTAT SL) Place 0.4 mg under the tongue       omeprazole (PRILOSEC) 20 MG DR capsule Take 20 mg by mouth as needed       sildenafil (VIAGRA) 25 MG tablet Take 25 mg by mouth as needed. As needed       tamsulosin (FLOMAX) 0.4 MG capsule          PAST SURGICAL HISTORY:  Past Surgical History:   Procedure Laterality Date     ANGIOPLASTY  1992    x 4     CATARACT IOL, RT/LT      [facility] about 2018-9     LASER YAG CAPSULOTOMY Right 03/22/2021     LASER YAG CAPSULOTOMY Left 04/01/2021     OPEN REDUCTION INTERNAL FIXATION ANKLE Right 1978     REPAIR ANEURYSM ABDOMINAL AORTA  11/09/2018    [facility] in the [facility]in        ALLERGIES:   No Known Allergies    FAMILY HISTORY:  Family History   Problem  Relation Age of Onset     Diabetes Mother      Heart Disease Mother         CHF     Cerebrovascular Disease Mother      Macular Degeneration Mother      Diabetes Sister      Cerebrovascular Disease Sister      Heart Disease Brother         CHF     Cerebrovascular Disease Brother      Heart Disease Sister         CHF     Cancer No family hx of      Hypertension No family hx of      Thyroid Disease No family hx of      Glaucoma No family hx of          SOCIAL HISTORY:  Social History     Tobacco Use     Smoking status: Former Smoker     Packs/day: 1.00     Years: 30.00     Pack years: 30.00     Types: Cigarettes     Quit date: 1983     Years since quittin.0     Smokeless tobacco: Never Used   Substance Use Topics     Alcohol use: No     Alcohol/week: 0.0 standard drinks     Drug use: No       ROS:   Constitutional: No fever, chills, or sweats. Weight stable.   Cardiovascular: As per HPI.       Exam:  /66 (BP Location: Right arm, Patient Position: Chair, Cuff Size: Adult Regular)   Pulse 66   Wt 60.8 kg (134 lb)   SpO2 97%   BMI 23.68 kg/m    GENERAL APPEARANCE: alert and no distress  HEENT: no icterus, no central cyanosis  LYMPH/NECK: no adenopathy, no asymmetry, JVP not elevated  RESPIRATORY: lungs clear to auscultation - no rales, rhonchi or wheezes, no use of accessory muscles, no retractions, respirations are unlabored, normal respiratory rate  CARDIOVASCULAR: regular rhythm, normal S1, S2, no S3 or S4 and no murmur, click or rub, precordium quiet with normal PMI.  EXTREMITIES: no edema  NEURO: alert, normal speech,and affect  SKIN: no ecchymoses, no rashes     I have reviewed the labs and personally reviewed the imaging below and made my comment in the assessment and plan.    Labs:  CBC RESULTS:   Lab Results   Component Value Date    WBC 9.0 2018    RBC 4.23 (L) 2018    HGB 12.3 (L) 2018    HCT 38.4 (L) 2018    MCV 91 2018    MCH 29.1 2018    MCHC 32.0  11/29/2018    RDW 15.5 (H) 11/29/2018     11/29/2018       BMP RESULTS:  Lab Results   Component Value Date     11/29/2018    POTASSIUM 4.2 11/29/2018    CHLORIDE 104 11/29/2018    CO2 29 11/29/2018    ANIONGAP 5 11/29/2018    GLC 93 11/29/2018    BUN 19 11/29/2018    CR 1.03 11/29/2018    GFRESTIMATED 69 11/29/2018    GFRESTBLACK 84 11/29/2018    YUNIOR 9.1 11/29/2018     TTE 8/15/2022  Technically difficult study.Extremely poor acoustic windows.  Global and regional left ventricular function is normal with an EF of 55-60%.  Right ventricular function, chamber size, wall motion, and thickness are  normal.  Pulmonary artery systolic pressure cannot be assessed.  Sinuses of Valsalva 3.9 cm.  Aortic root index 24.3mm/m2,mild dilation for BSA of 1.6m2.  The inferior vena cava is normal.  No pericardial effusion is present.  There is no prior study for direct comparison.    Chest CT 6/11/2020 [facility]:     1.  New irregular 22 x 8 mm subpleural right lower lobe pulmonary nodule with  adjacent smaller nodularity. This is suspicious for pulmonary malignancy  although the possibility of infection is not excluded. This is atypical for  COVID 19 pneumonia. See follow-up recommendation below.  2.  Stable groundglass nodule in the left lower lobe.    Echocardiogram 1/13/2019:   1. Mildly enlarged left atrium.   2. Grade 1 pattern of LV diastolic filling.   3. Normal left ventricular size, borderline wall thickness, normal global systolic function, calculated EF of 68 %.   4. Right ventricular cavity size is normal, global systolic RV function is normal.   5. The aortic valve is normal and trileaflet, no stenosis and mild regurgitation.   6. The mitral valve is sclerotic, mild mitral regurgitation.   7. 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   8. No pericardial effusion    EKG 6/12/2020 [facility] sinus rhythm with PACs.    Assessment and Plan:   Mr. Pranay Dubon is a 84 year old   male with PMH significant for chronic iron deficiency anemia,  hypertension, AAA repair in 11/2018 and CAD S/P PCI in 1985×4 (no coronary angiogram since then), and small bowel obstruction requiring abdominal surgery.    Patient follows with me today with regards to his prior history of CAD.  He is overall asymptomatic from cardiac standpoint.  He is on aspirin and metoprolol.  He had echocardiogram 2 days ago which showed normal biventricular function with no significant valve disease.  Physical exam is unremarkable.  Vitals are normal.  I recommend to continue current medication and follow-up with me as needed. [facility] triple AAA repair and GI symptoms.    Return to clinic as needed.    Total time spent 20 minutes including face-to-face clinic visit, review of labs/tests and medical documentation.    Please donot hesitate to contact me if you have any questions or concerns. Again, thank you for allowing me to participate in the care of your patient.    Ricardo GROSSMAN MD  [facility] Division of Cardiology  Pager [phone]

## 2022-08-17 NOTE — NURSING NOTE
"Chief Complaint   Patient presents with     RECHECK     Annual return CAD with Echo.        Initial /66 (BP Location: Right arm, Patient Position: Chair, Cuff Size: Adult Regular)   Pulse 66   Wt 60.8 kg (134 lb)   SpO2 97%   BMI 23.68 kg/m   Estimated body mass index is 23.68 kg/m  as calculated from the following:    Height as of 11/29/18: 1.602 m (5' 3.07\").    Weight as of this encounter: 60.8 kg (134 lb)..  BP completed using cuff size: regular    PRO Black  "

## 2022-08-17 NOTE — LETTER
8/17/2022      RE: Pranay Dubon  [address] [facility] [address]       Dear Colleague,    Thank you for the opportunity to participate in the care of your patient, Pranay Dubon, at the [facility]. Please see a copy of my visit note below.    HPI: Mr. Pranay Dubon is a 84 year old  male with PMH significant for chronic iron deficiency anemia, heart failure with preserved ejection fraction, hypertension, paroxysmal atrial fibrillation on aspirin only, AAA repair in 11/2018 and CAD S/P PCI in 1985×4 (no coronary angiogram since then), and small bowel obstruction requiring abdominal surgery.    Mr. Lindsey is a new patient to me.  All his prior cardiac care was at [facility].  Patient was last seen in cardiology clinic at [facility] a year ago.  He is establishing care with us today.  He has recently seen Dr. Lazaro and he is referred to cardiology due to his history of AAA surgery and paroxysmal atrial fibrillation.      Patient was recently admitted to [facility] on 6/11/2020 for epigastric and chest discomfort.  Patient's symptoms apparently started after working in his backyard.  He stayed at the hospital for 3 days.  He underwent thorough evaluation including CT of the chest which showed no evidence of dissection. CT of the abdomen pelvis was obtained which was negative for evidence of malignancy though was concerning for cholecystitis.  Patient was found to have significant leukocytosis on admission.  On hospital day 1 his white count yaakov to 32K.  He reported resolution of chest and back pain.  Serial troponins were negative.  Patient underwent HIDA scan which showed patent bile ducts. He was deemed to be dehydrated.  No medication change was recommended.      Since discharge the patient reports having balance issues.  When he tries to move fast he feels like he is losing balance.  Denies  chest pain, syncope, lower extremity edema.  He tells me that he cannot gain weight.  His appetite is good.  Patient also tells me that he has chronic diarrhea since small bowel surgery in January 2019.  He tells me that he is almost semi-continent.  He checks his pulse and finds his pulse around 50s.  Blood pressure 100/59 mmHg.  Patient again tells me that he has never felt atrial fibrillation.  He is aware of only one episode of A. fib when [facility].  He denies recurrence since then.  Patient's EKG shows sinus rhythm during his most recent admission as well.  Reviewing patient's prior cardiology reports I have seen that he was noted to have recurrent epistaxis when he was on warfarin. Eventually he was taken off of warfarin due to GI bleeding of unclear source.  His hemoglobin was as low as 6 at some point.  He is maintaining stable hemoglobin now iron replacement.    [facility].    Patient has 30-pack-year history of smoking.  Quit date 1983.  Patient was admitted to [facility] December 2019 with small bowel obstruction complicated by septic shock from bacteremia.  He underwent exploratory laparotomy with lysis of adhesions.  His hospital course was complicated by SHANTEL requiring temporary CRRT.    Echocardiogram a year ago ([facility]) showed normal biventricular function with grade 1 LV diastolic dysfunction.  No significant valve disease was noted.     Medications, personal, family, and social history reviewed with patient and revised.    Interval history 8/17/2022:  Patient is being seen today for follow-up.  Last time I saw him 2 years ago.  He is overall feeling well from cardiac standpoint.  No chest pain, shortness of breath, palpitations, dizziness, syncope or lower extremity edema.  [facility] He [facility] and he tells me that his [facility] are trying to  figure out his diarrhea and constipation problem.  His wife is accompanying him today.  They walk outside.  He can cut the grass.    PAST MEDICAL HISTORY:  Past Medical History:   Diagnosis Date     Atrial fibrillation (H)      BPH (benign prostatic hyperplasia)      CAD (coronary artery disease)     4 stents     ED (erectile dysfunction)      GERD (gastroesophageal reflux disease)      Hypercholesterolemia      Hypertension goal BP (blood pressure) < 140/90      Lumbar stenosis      Microscopic hematuria     normal urologic evaluation      Nonsenile cataract      Obesity      SNHL (sensorineural hearing loss)      Type 2 diabetes mellitus without complications (H)        CURRENT MEDICATIONS:  Current Outpatient Medications   Medication Sig Dispense Refill     aspirin (ASA) 325 MG EC tablet Take 325 mg by mouth daily       atorvastatin (LIPITOR) 40 MG tablet Take 1 tablet (40 mg) by mouth daily - must complete blood draw at [facility] lab for lipid lab (fast for 12 hrs prior) call to schedule : [phone] 90 tablet 0     metoprolol tartrate (LOPRESSOR) 25 MG tablet Take 1 tablet (25 mg) by mouth daily 90 tablet 0     multivitamin w/minerals (THERA-VIT-M) tablet Take 2 tablets by mouth daily        Nitroglycerin (NITROSTAT SL) Place 0.4 mg under the tongue       omeprazole (PRILOSEC) 20 MG DR capsule Take 20 mg by mouth as needed       sildenafil (VIAGRA) 25 MG tablet Take 25 mg by mouth as needed. As needed       tamsulosin (FLOMAX) 0.4 MG capsule          PAST SURGICAL HISTORY:  Past Surgical History:   Procedure Laterality Date     ANGIOPLASTY  1992    x 4     CATARACT IOL, RT/LT      [facility] about 2018-9     LASER YAG CAPSULOTOMY Right 03/22/2021     LASER YAG CAPSULOTOMY Left 04/01/2021     OPEN REDUCTION INTERNAL FIXATION ANKLE Right 1978     REPAIR ANEURYSM ABDOMINAL AORTA  11/09/2018    [facility] in the groin        ALLERGIES:   No Known Allergies    FAMILY  HISTORY:  Family History   Problem Relation Age of Onset     Diabetes Mother      Heart Disease Mother         CHF     Cerebrovascular Disease Mother      Macular Degeneration Mother      Diabetes Sister      Cerebrovascular Disease Sister      Heart Disease Brother         CHF     Cerebrovascular Disease Brother      Heart Disease Sister         CHF     Cancer No family hx of      Hypertension No family hx of      Thyroid Disease No family hx of      Glaucoma No family hx of          SOCIAL HISTORY:  Social History     Tobacco Use     Smoking status: Former Smoker     Packs/day: 1.00     Years: 30.00     Pack years: 30.00     Types: Cigarettes     Quit date: 1983     Years since quittin.0     Smokeless tobacco: Never Used   Substance Use Topics     Alcohol use: No     Alcohol/week: 0.0 standard drinks     Drug use: No       ROS:   Constitutional: No fever, chills, or sweats. Weight stable.   Cardiovascular: As per HPI.       Exam:  /66 (BP Location: Right arm, Patient Position: Chair, Cuff Size: Adult Regular)   Pulse 66   Wt 60.8 kg (134 lb)   SpO2 97%   BMI 23.68 kg/m    GENERAL APPEARANCE: alert and no distress  HEENT: no icterus, no central cyanosis  LYMPH/NECK: no adenopathy, no asymmetry, JVP not elevated  RESPIRATORY: lungs clear to auscultation - no rales, rhonchi or wheezes, no use of accessory muscles, no retractions, respirations are unlabored, normal respiratory rate  CARDIOVASCULAR: regular rhythm, normal S1, S2, no S3 or S4 and no murmur, click or rub, precordium quiet with normal PMI.  EXTREMITIES: no edema  NEURO: alert, normal speech,and affect  SKIN: no ecchymoses, no rashes     I have reviewed the labs and personally reviewed the imaging below and made my comment in the assessment and plan.    Labs:  CBC RESULTS:   Lab Results   Component Value Date    WBC 9.0 2018    RBC 4.23 (L) 2018    HGB 12.3 (L) 2018    HCT 38.4 (L) 2018    MCV 91 2018     MCH 29.1 11/29/2018    MCHC 32.0 11/29/2018    RDW 15.5 (H) 11/29/2018     11/29/2018       BMP RESULTS:  Lab Results   Component Value Date     11/29/2018    POTASSIUM 4.2 11/29/2018    CHLORIDE 104 11/29/2018    CO2 29 11/29/2018    ANIONGAP 5 11/29/2018    GLC 93 11/29/2018    BUN 19 11/29/2018    CR 1.03 11/29/2018    GFRESTIMATED 69 11/29/2018    GFRESTBLACK 84 11/29/2018    YUNIOR 9.1 11/29/2018     TTE 8/15/2022  Technically difficult study.Extremely poor acoustic windows.  Global and regional left ventricular function is normal with an EF of 55-60%.  Right ventricular function, chamber size, wall motion, and thickness are  normal.  Pulmonary artery systolic pressure cannot be assessed.  Sinuses of Valsalva 3.9 cm.  Aortic root index 24.3mm/m2,mild dilation for BSA of 1.6m2.  The inferior vena cava is normal.  No pericardial effusion is present.  There is no prior study for direct comparison.    Chest CT 6/11/2020 [facility]:     1.  New irregular 22 x 8 mm subpleural right lower lobe pulmonary nodule with  adjacent smaller nodularity. This is suspicious for pulmonary malignancy  although the possibility of infection is not excluded. This is atypical for  COVID 19 pneumonia. See follow-up recommendation below.  2.  Stable groundglass nodule in the left lower lobe.    Echocardiogram 1/13/2019:   1. Mildly enlarged left atrium.   2. Grade 1 pattern of LV diastolic filling.   3. Normal left ventricular size, borderline wall thickness, normal global systolic function, calculated EF of 68 %.   4. Right ventricular cavity size is normal, global systolic RV function is normal.   5. The aortic valve is normal and trileaflet, no stenosis and mild regurgitation.   6. The mitral valve is sclerotic, mild mitral regurgitation.   7. 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   8. No pericardial effusion    EKG 6/12/2020 [facility] sinus rhythm with PACs.    Assessment and Plan:     Pranay Dubon is a 84 year old  male with PMH significant for chronic iron deficiency anemia,  hypertension, AAA repair in 11/2018 and CAD S/P PCI in 1985×4 (no coronary angiogram since then), and small bowel obstruction requiring abdominal surgery.    Patient follows with me today with regards to his prior history of CAD.  He is overall asymptomatic from cardiac standpoint.  He is on aspirin and metoprolol.  He had echocardiogram 2 days ago which showed normal biventricular function with no significant valve disease.  Physical exam is unremarkable.  Vitals are normal.  I recommend to continue current medication and follow-up with me as needed. [facility] his [facility] of triple AAA repair and GI symptoms.    Return to clinic as needed.    Total time spent 20 minutes including face-to-face clinic visit, review of labs/tests and medical documentation.    Please donot hesitate to contact me if you have any questions or concerns. Again, thank you for allowing me to participate in the care of your patient.    Ricardo GROSSMAN MD  [facility] Division of Cardiology  Pager [phone]           Please do not hesitate to contact me if you have any questions/concerns.     Sincerely,     Ricardo Grossman MD

## 2022-10-29 DIAGNOSIS — I10 BENIGN ESSENTIAL HYPERTENSION: ICD-10-CM

## 2022-11-02 RX ORDER — METOPROLOL TARTRATE 25 MG/1
25 TABLET, FILM COATED ORAL DAILY
Qty: 90 TABLET | Refills: 2 | Status: SHIPPED | OUTPATIENT
Start: 2022-11-02 | End: 2023-07-31

## 2022-11-02 NOTE — TELEPHONE ENCOUNTER
Last Clinic Visit: 8/17/2022 [facility]

## 2022-11-05 DIAGNOSIS — E78.5 HYPERLIPIDEMIA WITH TARGET LDL LESS THAN 100: ICD-10-CM

## 2022-11-08 ENCOUNTER — TELEPHONE (OUTPATIENT)
Dept: CARDIOLOGY | Facility: CLINIC | Age: 87
End: 2022-11-08

## 2022-11-08 RX ORDER — ATORVASTATIN CALCIUM 40 MG/1
40 TABLET, FILM COATED ORAL DAILY
Qty: 30 TABLET | Refills: 0 | Status: SHIPPED | OUTPATIENT
Start: 2022-11-08 | End: 2023-03-02

## 2022-11-08 NOTE — TELEPHONE ENCOUNTER
See telephone encounter  
atorvastatin (LIPITOR) 40 MG tablet      Last Written Prescription Date:  8/4/2022  Last Fill Quantity: 90,   # refills: 0  Last Office Visit : 8/17/2022  Future Office visit:  none    Routing refill request to provider for review/approval because:  Failed medication protocol: lab overdue  - LDL (2017)  - hever refill 90 day supply given 8/4/2022  - pended Rx 30 day supply       LDL Cholesterol Calculated   Date Value Ref Range Status   01/25/2017 72 <100 mg/dL Final     Comment:     Desirable:       <100 mg/dl       
61

## 2022-11-08 NOTE — TELEPHONE ENCOUNTER
Pt overdue for lipids. Were to be drawn 8/2022- with PCP labs, not drawn.     Will call pt to schedule fast lab. Order in EMR

## 2022-11-08 NOTE — TELEPHONE ENCOUNTER
Called and spoke to patient. [facility] and is unsure why this is needed. Writer explained that Dr. Bourgeois ordered this at his appointment in August and it was never drawn. Patient stated he has never had an issue with his cholesterol and writer explained that he is on atorvastatin which is a cholesterol medication so the labs are most likely just to check cholesterol. Patient agreed to scheduling labs and is aware he needs to fast for 10-12 hours prior to lab appointment (per laboratory instructions). Patient is scheduled for 11/11 at 8:30 am and is aware of [place] location.    KRISTI Washington

## 2022-11-11 ENCOUNTER — LAB (OUTPATIENT)
Dept: LAB | Facility: CLINIC | Age: 87
End: 2022-11-11
Payer: COMMERCIAL

## 2022-11-11 DIAGNOSIS — I12.0 HYPERTENSIVE CHRONIC KIDNEY DISEASE WITH STAGE 5 CHRONIC KIDNEY DISEASE OR END STAGE RENAL DISEASE (H): ICD-10-CM

## 2022-11-11 DIAGNOSIS — E78.5 HYPERLIPIDEMIA WITH TARGET LDL LESS THAN 100: ICD-10-CM

## 2022-11-11 DIAGNOSIS — I50.32 CHRONIC DIASTOLIC (CONGESTIVE) HEART FAILURE (H): ICD-10-CM

## 2022-11-11 LAB
ALP SERPL-CCNC: 63 U/L (ref 40–150)
ALT SERPL W P-5'-P-CCNC: 16 U/L (ref 0–70)
ANION GAP SERPL CALCULATED.3IONS-SCNC: 3 MMOL/L (ref 3–14)
BUN SERPL-MCNC: 32 MG/DL (ref 7–30)
CALCIUM SERPL-MCNC: 9.2 MG/DL (ref 8.5–10.1)
CHLORIDE BLD-SCNC: 110 MMOL/L (ref 94–109)
CHOLEST SERPL-MCNC: 157 MG/DL
CO2 SERPL-SCNC: 24 MMOL/L (ref 20–32)
CREAT SERPL-MCNC: 1.55 MG/DL (ref 0.66–1.25)
ERYTHROCYTE [DISTWIDTH] IN BLOOD BY AUTOMATED COUNT: 15.7 % (ref 10–15)
FASTING STATUS PATIENT QL REPORTED: NORMAL
GFR SERPL CREATININE-BSD FRML MDRD: 43 ML/MIN/1.73M2
GLUCOSE BLD-MCNC: 100 MG/DL (ref 70–99)
HCT VFR BLD AUTO: 37.8 % (ref 40–53)
HDLC SERPL-MCNC: 74 MG/DL
HGB BLD-MCNC: 11.9 G/DL (ref 13.3–17.7)
LDLC SERPL CALC-MCNC: 70 MG/DL
MCH RBC QN AUTO: 28.1 PG (ref 26.5–33)
MCHC RBC AUTO-ENTMCNC: 31.5 G/DL (ref 31.5–36.5)
MCV RBC AUTO: 89 FL (ref 78–100)
NONHDLC SERPL-MCNC: 83 MG/DL
PHOSPHATE SERPL-MCNC: 3.1 MG/DL (ref 2.5–4.5)
PLATELET # BLD AUTO: 258 10E3/UL (ref 150–450)
POTASSIUM BLD-SCNC: 4.7 MMOL/L (ref 3.4–5.3)
PTH-INTACT SERPL-MCNC: 27 PG/ML (ref 15–65)
RBC # BLD AUTO: 4.23 10E6/UL (ref 4.4–5.9)
SODIUM SERPL-SCNC: 137 MMOL/L (ref 133–144)
TRIGL SERPL-MCNC: 63 MG/DL
WBC # BLD AUTO: 12.7 10E3/UL (ref 4–11)

## 2022-11-11 PROCEDURE — 84100 ASSAY OF PHOSPHORUS: CPT

## 2022-11-11 PROCEDURE — 84460 ALANINE AMINO (ALT) (SGPT): CPT

## 2022-11-11 PROCEDURE — 36415 COLL VENOUS BLD VENIPUNCTURE: CPT

## 2022-11-11 PROCEDURE — 85027 COMPLETE CBC AUTOMATED: CPT

## 2022-11-11 PROCEDURE — 83970 ASSAY OF PARATHORMONE: CPT

## 2022-11-11 PROCEDURE — 84075 ASSAY ALKALINE PHOSPHATASE: CPT

## 2022-11-11 PROCEDURE — 80048 BASIC METABOLIC PNL TOTAL CA: CPT

## 2022-11-11 PROCEDURE — 80061 LIPID PANEL: CPT

## 2022-11-11 NOTE — LETTER
November 14, 2022      Armen Dubon  [address] [facility] [address]        Dear ,    We are writing to inform you of your test results.    Armen,     Your cholesterol test looks great.            Resulted Orders   Lipid panel reflex to direct LDL Fasting   Result Value Ref Range    Cholesterol 157 <200 mg/dL    Triglycerides 63 <150 mg/dL    Direct Measure HDL 74 >=40 mg/dL    LDL Cholesterol Calculated 70 <=100 mg/dL    Non HDL Cholesterol 83 <130 mg/dL    Patient Fasting > 8hrs? Unknown     Narrative    Cholesterol  Desirable:  <200 mg/dL    Triglycerides  Normal:  Less than 150 mg/dL  Borderline High:  150-199 mg/dL  High:  200-499 mg/dL  Very High:  Greater than or equal to 500 mg/dL    Direct Measure HDL  Female:  Greater than or equal to 50 mg/dL   Male:  Greater than or equal to 40 mg/dL    LDL Cholesterol  Desirable:  <100mg/dL  Above Desirable:  100-129 mg/dL   Borderline High:  130-159 mg/dL   High:  160-189 mg/dL   Very High:  >= 190 mg/dL    Non HDL Cholesterol  Desirable:  130 mg/dL  Above Desirable:  130-159 mg/dL  Borderline High:  160-189 mg/dL  High:  190-219 mg/dL  Very High:  Greater than or equal to 220 mg/dL       If you have any questions or concerns, please call the clinic at the number listed above.       Sincerely,      Ricardo Bourgeois MD

## 2023-02-28 DIAGNOSIS — E78.5 HYPERLIPIDEMIA WITH TARGET LDL LESS THAN 100: ICD-10-CM

## 2023-03-02 RX ORDER — ATORVASTATIN CALCIUM 40 MG/1
40 TABLET, FILM COATED ORAL DAILY
Qty: 90 TABLET | Refills: 1 | Status: SHIPPED | OUTPATIENT
Start: 2023-03-02

## 2023-04-20 ENCOUNTER — PATIENT OUTREACH (OUTPATIENT)
Dept: CARE COORDINATION | Facility: CLINIC | Age: 88
End: 2023-04-20
Payer: COMMERCIAL

## 2023-04-20 ENCOUNTER — TELEPHONE (OUTPATIENT)
Dept: FAMILY MEDICINE | Facility: CLINIC | Age: 88
End: 2023-04-20
Payer: COMMERCIAL

## 2023-04-20 NOTE — TELEPHONE ENCOUNTER
Forms/Letter Request    Type of form/letter: Needs new letter for for reinstatement for drivers license stating able to drive needs to be on [place]  Do we have the form/letter: No    Who is the form from? [employer]    When is form/letter needed by: soon    How would you like the form/letter returned:       Irene Beltrán   [employer]

## 2023-04-21 NOTE — TELEPHONE ENCOUNTER
Patient returning call regarding previous note.    Routing to covering provider pool to please address, PCP not in clinic.    LISA HardyN RN  [facility]

## 2023-04-24 ENCOUNTER — OFFICE VISIT (OUTPATIENT)
Dept: FAMILY MEDICINE | Facility: CLINIC | Age: 88
End: 2023-04-24
Payer: COMMERCIAL

## 2023-04-24 VITALS
BODY MASS INDEX: 23.4 KG/M2 | DIASTOLIC BLOOD PRESSURE: 82 MMHG | HEART RATE: 65 BPM | WEIGHT: 132.4 LBS | OXYGEN SATURATION: 99 % | TEMPERATURE: 97.7 F | SYSTOLIC BLOOD PRESSURE: 145 MMHG

## 2023-04-24 DIAGNOSIS — Z91.89 DRIVING SAFETY ISSUE: Primary | ICD-10-CM

## 2023-04-24 PROCEDURE — 99213 OFFICE O/P EST LOW 20 MIN: CPT | Performed by: FAMILY MEDICINE

## 2023-04-24 NOTE — PROGRESS NOTES
Assessment & Plan       ICD-10-CM    1. Driving safety issue  Z91.89             Review of external notes as documented elsewhere in note         Patient Instructions    assessment and training    To schedule an appointment at any of our locations please call [phone] or email us at [email or web address]    [email or web address]      Ramone Chávez MD  [facility] CHARITO Ayers is a 87 year old, presenting for the following health issues:  Letter Request (DMV to reinstate license)        4/24/2023    11:11 AM   Additional Questions   Roomed by Linda     Forms 4/24/2023   Any forms needing to be completed Yes     HPI     Requests paperwork to have 's license reinstated        Review of Systems   Constitutional, HEENT, cardiovascular, pulmonary, gi and gu systems are negative, except as otherwise noted.      Objective    BP (!) 145/82   Pulse 65   Temp 97.7  F (36.5  C) (Oral)   Wt 60.1 kg (132 lb 6.4 oz)   SpO2 99%   BMI 23.40 kg/m    Body mass index is 23.4 kg/m .  Physical Exam  Constitutional:       General: He is not in acute distress.     Appearance: Normal appearance. He is well-developed. He is not ill-appearing.   HENT:      Head: Normocephalic and atraumatic.      Right Ear: External ear normal.      Left Ear: External ear normal.      Nose: Nose normal.   Eyes:      General: No scleral icterus.     Extraocular Movements: Extraocular movements intact.      Conjunctiva/sclera: Conjunctivae normal.   Cardiovascular:      Rate and Rhythm: Normal rate.   Pulmonary:      Effort: Pulmonary effort is normal.   Musculoskeletal:      Cervical back: Normal range of motion and neck supple.   Skin:     General: Skin is warm and dry.   Neurological:      Mental Status: He is alert and oriented to person, place, and time.   Psychiatric:         Behavior: Behavior normal.         Thought Content: Thought content normal.          Judgment: Judgment normal.

## 2023-06-02 ENCOUNTER — HEALTH MAINTENANCE LETTER (OUTPATIENT)
Age: 88
End: 2023-06-02

## 2023-06-21 ENCOUNTER — TELEPHONE (OUTPATIENT)
Dept: FAMILY MEDICINE | Facility: CLINIC | Age: 88
End: 2023-06-21
Payer: COMMERCIAL

## 2023-06-21 NOTE — TELEPHONE ENCOUNTER
Patient Quality Outreach    Patient is due for the following:   Hypertension -  Hypertension follow-up visit  Physical Annual Wellness Visit      Topic Date Due     COVID-19 Vaccine (6 - Pfizer series) 01/22/2023     Diptheria Tetanus Pertussis (DTAP/TDAP/TD) Vaccine (2 - Td or Tdap) 04/16/2023       Next Steps:   Schedule a office visit for HTN Check Annual Wellness Visit    Type of outreach:    Sent [employer] message.      Questions for provider review:    None           An Binta, [facility]  Chart routed to none.

## 2023-06-21 NOTE — LETTER
July 21, 2023    To  Pranay Dubon  [address]  [place] [facility] [address]    Your team at [facility] cares about your health. We have reviewed your chart and based on our findings; we are making the following recommendations to better manage your health.     You are in particular need of attention regarding the following:     HYPERTENSION FOLLOW UP: Office Visit  PREVENTATIVE VISIT: Annual Medicare Wellness:Schedule an Annual Medicare Wellness Exam. Please call your [facility] clinic to set up your appointment.    If you have already completed these items, please contact the clinic via phone or   MyChart so your care team can review and update your records. Thank you for   choosing [facility] Clinics for your healthcare needs. For any questions,   concerns, or to schedule an appointment please contact our clinic.    Healthy Regards,      Your [facility] Care Team/AV

## 2023-07-21 NOTE — TELEPHONE ENCOUNTER
Patient Quality Outreach    Patient is due for the following:   Hypertension -  Hypertension follow-up visit  Physical Annual Wellness Visit      Topic Date Due     COVID-19 Vaccine (6 - Pfizer series) 01/22/2023     Diptheria Tetanus Pertussis (DTAP/TDAP/TD) Vaccine (2 - Td or Tdap) 04/16/2023       Next Steps:   Schedule a office visit for HTN follow up and  Annual Wellness Visit    Type of outreach:    Sent letter.    Next Steps:  Reach out within 90 days via [employer].    Max number of attempts reached: Yes. Will try again in 90 days if patient still on fail list.    Questions for provider review:    None           An Binta, [facility]  Chart routed to none.

## 2023-07-22 ENCOUNTER — MYC MEDICAL ADVICE (OUTPATIENT)
Dept: FAMILY MEDICINE | Facility: CLINIC | Age: 88
End: 2023-07-22
Payer: COMMERCIAL

## 2023-07-24 NOTE — TELEPHONE ENCOUNTER
Please refer to mychart encounter from 6/21/23.     Routing to PCP to advise.    Shana Gonzalez RN  [facility]

## 2023-07-27 DIAGNOSIS — I10 BENIGN ESSENTIAL HYPERTENSION: ICD-10-CM

## 2023-07-31 RX ORDER — METOPROLOL TARTRATE 25 MG/1
25 TABLET, FILM COATED ORAL DAILY
Qty: 90 TABLET | Refills: 0 | Status: SHIPPED | OUTPATIENT
Start: 2023-07-31 | End: 2023-11-07

## 2023-08-04 NOTE — TELEPHONE ENCOUNTER
Called and scheduled video visit Monday 8/21 and informed 11:10am check in and will call to go over rooming process over the phone. Linda Martin MA

## 2023-08-21 ENCOUNTER — VIRTUAL VISIT (OUTPATIENT)
Dept: FAMILY MEDICINE | Facility: CLINIC | Age: 88
End: 2023-08-21
Payer: COMMERCIAL

## 2023-08-21 DIAGNOSIS — K52.9 CHRONIC DIARRHEA: Primary | ICD-10-CM

## 2023-08-21 PROCEDURE — 99214 OFFICE O/P EST MOD 30 MIN: CPT | Mod: VID | Performed by: FAMILY MEDICINE

## 2023-08-21 NOTE — PROGRESS NOTES
Armen is a 87 year old who is being evaluated via a billable video visit.      How would you like to obtain your AVS? MyChart  If the video visit is dropped, the invitation should be resent by: Text to cell phone: [phone]  Will anyone else be joining your video visit? No          Assessment & Plan       ICD-10-CM    1. Chronic diarrhea  K52.9 Adult GI  Referral - Consult Only            Review of external notes as documented elsewhere in note         There are no Patient Instructions on file for this visit.    Ramone Chávez MD  [facility]    Subjective   Armen is a 87 year old, presenting for the following health issues:  Hypertension        8/21/2023    12:10 PM   Additional Questions   Roomed by Linda         8/21/2023    12:10 PM   Patient Reported Additional Medications   Patient reports taking the following new medications none       History of Present Illness       Back Pain:  He presents for follow up of back pain. Patient's back pain is a chronic problem.  Location of back pain:  Other  Description of back pain: other  Back pain spreads: nowhere    Since patient first noticed back pain, pain is: always present, but gets better and worse  Does back pain interfere with his job:  Not applicable   He consumes 1 sweetened beverage(s) daily.He exercises with enough effort to increase his heart rate 9 or less minutes per day.  He exercises with enough effort to increase his heart rate 3 or less days per week.   He is taking medications regularly.       Hypertension Follow-up    Do you check your blood pressure regularly outside of the clinic? No   Are you following a low salt diet? No  Are your blood pressures ever more than 140 on the top number (systolic) OR more   than 90 on the bottom number (diastolic), for example 140/90? Yes      BP Readings from Last 6 Encounters:   04/24/23 (!) 145/82   08/17/22 109/66   03/28/22 (!) 144/76   08/02/21 114/66   11/29/18 134/60    10/16/17 122/70     Patient denies history of hypertension  Taking metoprolol for a fib  Doesn't check pressures at home    Chronic diarrhea/gas  Incontinence of stool  [facility]        Review of Systems   Constitutional, HEENT, cardiovascular, pulmonary, gi and gu systems are negative, except as otherwise noted.      Objective           Vitals:  No vitals were obtained today due to virtual visit.    Physical Exam   GENERAL: Healthy, alert and no distress  EYES: Eyes grossly normal to inspection.  No discharge or erythema, or obvious scleral/conjunctival abnormalities.  RESP: No audible wheeze, cough, or visible cyanosis.  No visible retractions or increased work of breathing.    SKIN: Visible skin clear. No significant rash, abnormal pigmentation or lesions.  NEURO: Cranial nerves grossly intact.  Mentation and speech appropriate for age.  PSYCH: Mentation appears normal, affect normal/bright, judgement and insight intact, normal speech and appearance well-groomed.                Video-Visit Details    Type of service:  Video Visit   Video Length 30 mins    Originating Location (pt. Location): Home    Distant Location (provider location):  On-site  Platform used for Video Visit: [employer]

## 2023-11-06 DIAGNOSIS — I10 BENIGN ESSENTIAL HYPERTENSION: ICD-10-CM

## 2023-11-08 NOTE — TELEPHONE ENCOUNTER
metoprolol tartrate (LOPRESSOR) 25 MG tablet 90 tablet 0 7/31/2023       Last Office Visit: 8/17/22  ( Return to clinic as needed. )  Future Office visit:   no    BP Readings from Last 3 Encounters:   04/24/23 (!) 145/82   08/17/22 109/66   03/28/22 (!) 144/76       Continue to refill or transfer to PCP?  Denise Golden RN

## 2023-11-10 RX ORDER — METOPROLOL TARTRATE 25 MG/1
25 TABLET, FILM COATED ORAL DAILY
Qty: 30 TABLET | Refills: 0 | Status: SHIPPED | OUTPATIENT
Start: 2023-11-10 | End: 2023-12-12

## 2023-12-07 NOTE — PROGRESS NOTES
HPI: Mr. Pranay Dubon is a 84 year old  male with PMH significant for chronic iron deficiency anemia, heart failure with preserved ejection fraction, hypertension, paroxysmal atrial fibrillation on aspirin only, AAA repair in 11/2018 and CAD S/P PCI in 1985×4 (no coronary angiogram since then), and small bowel obstruction requiring abdominal surgery.    Mr. Lindsey is a new patient to me.  All his prior cardiac care was at [facility].  Patient was last seen in cardiology clinic at [facility] a year ago.  He is establishing care with us today.  He has recently seen Dr. Lazaro and he is referred to cardiology due to his history of AAA surgery and paroxysmal atrial fibrillation.      Patient was recently admitted to [facility] on 6/11/2020 for epigastric and chest discomfort.  Patient's symptoms apparently started after working in his backyard.  He stayed at the hospital for 3 days.  He underwent thorough evaluation including CT of the chest which showed no evidence of dissection. CT of the abdomen pelvis was obtained which was negative for evidence of malignancy though was concerning for cholecystitis.  Patient was found to have significant leukocytosis on admission.  On hospital day 1 his white count yaakov to 32K.  He reported resolution of chest and back pain.  Serial troponins were negative.  Patient underwent HIDA scan which showed patent bile ducts. He was deemed to be dehydrated.  No medication change was recommended.      Since discharge the patient reports having balance issues.  When he tries to move fast he feels like he is losing balance.  Denies chest pain, syncope, lower extremity edema.  He tells me that he cannot gain weight.  His appetite is good.  Patient also tells me that he has chronic diarrhea since small bowel surgery in January 2019.  He tells me that he is almost semi-continent.  He checks his pulse and finds his pulse around 50s.  Blood pressure 100/59 mmHg.  Patient again tells  me that he has never felt atrial fibrillation.  He is aware of only one episode of A. fib when [facility] was [facility] the [facility].  He denies recurrence since then.  Patient's EKG shows sinus rhythm during his most recent admission as well.  Reviewing patient's prior cardiology reports I have seen that he was noted to have recurrent epistaxis when he was on warfarin. Eventually he was taken off of warfarin due to GI bleeding of unclear source.  His hemoglobin was as low as 6 at some point.  He is maintaining stable hemoglobin now iron replacement.    [facility].    Patient has 30-pack-year history of smoking.  Quit date 1983.  Patient was admitted to [facility] December 2019 with small bowel obstruction complicated by septic shock from bacteremia.  He underwent exploratory laparotomy with lysis of adhesions.  His hospital course was complicated by SHANTEL requiring temporary CRRT.    Echocardiogram a year ago ([facility]) showed normal biventricular function with grade 1 LV diastolic dysfunction.  No significant valve disease was noted.     Medications, personal, family, and social history reviewed with patient and revised.    Interval history 8/17/2022:  Patient is being seen today for follow-up.  Last time I saw him 2 years ago.  He is overall feeling well from cardiac standpoint.  No chest pain, shortness of breath, palpitations, dizziness, syncope or lower extremity edema.  [facility] He [facility] and he tells me that his [facility] are trying to figure out his diarrhea and constipation problem.  His wife is accompanying him today.  They walk outside.  He can cut the grass.    Interval history 12/8/2023:  Patient is being seen today for annual visit.  He tells me that his back pain is really troubling him.  He walks with a walker.  As soon as he stands up back pain close to tailbone kicks in.   Reports numbness down the left leg.  No cardiac symptoms at all.  No chest pain, shortness of breath, dizziness, syncope or lower extremity edema.  [facility].  PAST MEDICAL HISTORY:  Past Medical History:   Diagnosis Date    Atrial fibrillation (H)     BPH (benign prostatic hyperplasia)     CAD (coronary artery disease)     4 stents    ED (erectile dysfunction)     GERD (gastroesophageal reflux disease)     Hypercholesterolemia     Hypertension goal BP (blood pressure) < 140/90     Lumbar stenosis     Microscopic hematuria     normal urologic evaluation     Nonsenile cataract     Obesity     SNHL (sensorineural hearing loss)     Type 2 diabetes mellitus without complications (H)        CURRENT MEDICATIONS:  Current Outpatient Medications   Medication Sig Dispense Refill    aspirin (ASA) 325 MG EC tablet Take 325 mg by mouth daily      atorvastatin (LIPITOR) 40 MG tablet Take 1 tablet (40 mg) by mouth daily 90 tablet 1    metoprolol tartrate (LOPRESSOR) 25 MG tablet Take 1 tablet (25 mg) by mouth daily 30 tablet 0    multivitamin w/minerals (THERA-VIT-M) tablet Take 2 tablets by mouth daily       Nitroglycerin (NITROSTAT SL) Place 0.4 mg under the tongue      omeprazole (PRILOSEC) 20 MG DR capsule Take 20 mg by mouth as needed      sildenafil (VIAGRA) 25 MG tablet Take 25 mg by mouth as needed. As needed         PAST SURGICAL HISTORY:  Past Surgical History:   Procedure Laterality Date    ANGIOPLASTY  1992    x 4    CATARACT IOL, RT/LT      [facility] about 2018-9    LASER YAG CAPSULOTOMY Right 03/22/2021    LASER YAG CAPSULOTOMY Left 04/01/2021    OPEN REDUCTION INTERNAL FIXATION ANKLE Right 1978    REPAIR ANEURYSM ABDOMINAL AORTA  11/09/2018    [facility] in the groin        ALLERGIES:   No Known Allergies    FAMILY HISTORY:  Family History   Problem Relation Age of Onset    Diabetes Mother     Heart Disease Mother         CHF    Cerebrovascular  Disease Mother     Macular Degeneration Mother     Diabetes Sister     Cerebrovascular Disease Sister     Heart Disease Brother         CHF    Cerebrovascular Disease Brother     Heart Disease Sister         CHF    Cancer No family hx of     Hypertension No family hx of     Thyroid Disease No family hx of     Glaucoma No family hx of          SOCIAL HISTORY:  Social History     Tobacco Use    Smoking status: Former     Packs/day: 1.00     Years: 30.00     Additional pack years: 0.00     Total pack years: 30.00     Types: Cigarettes     Quit date: 1983     Years since quittin.3    Smokeless tobacco: Never   Substance Use Topics    Alcohol use: No     Alcohol/week: 0.0 standard drinks of alcohol    Drug use: No       ROS:   Constitutional: No fever, chills, or sweats. Weight stable.   Cardiovascular: As per HPI.       Exam:  /78   Pulse 56   Wt 55.8 kg (123 lb)   BMI 21.74 kg/m    GENERAL APPEARANCE: alert and no distress  HEENT: no icterus, no central cyanosis  LYMPH/NECK: no adenopathy, no asymmetry, JVP not elevated  RESPIRATORY: lungs clear to auscultation - no rales, rhonchi or wheezes, no use of accessory muscles, no retractions, respirations are unlabored, normal respiratory rate  CARDIOVASCULAR: regular rhythm, normal S1, S2, no S3 or S4 and no murmur, click or rub, precordium quiet with normal PMI.  EXTREMITIES: no edema  NEURO: alert, normal speech,and affect  SKIN: no ecchymoses, no rashes     I have reviewed the labs and personally reviewed the imaging below and made my comment in the assessment and plan.    Labs:  CBC RESULTS:   Lab Results   Component Value Date    WBC 12.7 (H) 2022    WBC 9.0 2018    RBC 4.23 (L) 2022    RBC 4.23 (L) 2018    HGB 11.9 (L) 2022    HGB 12.3 (L) 2018    HCT 37.8 (L) 2022    HCT 38.4 (L) 2018    MCV 89 2022    MCV 91 2018    MCH 28.1 2022    MCH 29.1 2018    MCHC 31.5 2022     MCHC 32.0 11/29/2018    RDW 15.7 (H) 11/11/2022    RDW 15.5 (H) 11/29/2018     11/11/2022     11/29/2018       BMP RESULTS:  Lab Results   Component Value Date     11/11/2022     11/29/2018    POTASSIUM 4.7 11/11/2022    POTASSIUM 4.2 11/29/2018    CHLORIDE 110 (H) 11/11/2022    CHLORIDE 104 11/29/2018    CO2 24 11/11/2022    CO2 29 11/29/2018    ANIONGAP 3 11/11/2022    ANIONGAP 5 11/29/2018     (H) 11/11/2022    GLC 93 11/29/2018    BUN 32 (H) 11/11/2022    BUN 19 11/29/2018    CR 1.55 (H) 11/11/2022    CR 1.03 11/29/2018    GFRESTIMATED 43 (L) 11/11/2022    GFRESTIMATED 69 11/29/2018    GFRESTBLACK 84 11/29/2018    YUNIOR 9.2 11/11/2022    YUNIOR 9.1 11/29/2018     TTE 8/15/2022  Technically difficult study.Extremely poor acoustic windows.  Global and regional left ventricular function is normal with an EF of 55-60%.  Right ventricular function, chamber size, wall motion, and thickness are  normal.  Pulmonary artery systolic pressure cannot be assessed.  Sinuses of Valsalva 3.9 cm.  Aortic root index 24.3mm/m2,mild dilation for BSA of 1.6m2.  The inferior vena cava is normal.  No pericardial effusion is present.  There is no prior study for direct comparison.    Chest CT 6/11/2020 [facility]:     1.  New irregular 22 x 8 mm subpleural right lower lobe pulmonary nodule with  adjacent smaller nodularity. This is suspicious for pulmonary malignancy  although the possibility of infection is not excluded. This is atypical for  COVID 19 pneumonia. See follow-up recommendation below.  2.  Stable groundglass nodule in the left lower lobe.    Echocardiogram 1/13/2019:   1. Mildly enlarged left atrium.   2. Grade 1 pattern of LV diastolic filling.   3. Normal left ventricular size, borderline wall thickness, normal global systolic function, calculated EF of 68 %.   4. Right ventricular cavity size is normal, global systolic RV function is normal.   5. The aortic valve is normal and trileaflet,  no stenosis and mild regurgitation.   6. The mitral valve is sclerotic, mild mitral regurgitation.   7. 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   8. No pericardial effusion    EKG 6/12/2020 [facility] sinus rhythm with PACs.    Assessment and Plan:   Mr. Pranay Dubon is a 88 year old  male with PMH significant for chronic iron deficiency anemia,  hypertension, AAA repair in 11/2018 and CAD S/P PCI in 1985×4 (no coronary angiogram since then), and small bowel obstruction requiring abdominal surgery.    Patient follows with me today with regards to his prior history of CAD.  He is overall asymptomatic from cardiac standpoint.  He is on aspirin and metoprolol.  He had echocardiogram a year ago which showed normal biventricular function with no significant valve disease.  Physical exam is unremarkable.  Vitals are normal.  I recommend to continue current medication and follow-up with me as needed. [facility] AAA repair and GI symptoms.    Return to clinic as needed.    Total time spent 20 minutes including face-to-face clinic visit, review of labs/tests and medical documentation.    Ricardo GROSSMAN MD  [facility] Division of Cardiology  Pager [phone]

## 2023-12-08 ENCOUNTER — OFFICE VISIT (OUTPATIENT)
Dept: CARDIOLOGY | Facility: CLINIC | Age: 88
End: 2023-12-08
Payer: COMMERCIAL

## 2023-12-08 VITALS
SYSTOLIC BLOOD PRESSURE: 124 MMHG | HEART RATE: 56 BPM | BODY MASS INDEX: 21.74 KG/M2 | WEIGHT: 123 LBS | DIASTOLIC BLOOD PRESSURE: 78 MMHG

## 2023-12-08 DIAGNOSIS — Z98.890 S/P AAA REPAIR: ICD-10-CM

## 2023-12-08 DIAGNOSIS — I10 BENIGN ESSENTIAL HYPERTENSION: ICD-10-CM

## 2023-12-08 DIAGNOSIS — I25.10 CORONARY ARTERY DISEASE INVOLVING NATIVE CORONARY ARTERY OF NATIVE HEART WITHOUT ANGINA PECTORIS: Primary | ICD-10-CM

## 2023-12-08 DIAGNOSIS — Z86.79 S/P AAA REPAIR: ICD-10-CM

## 2023-12-08 PROCEDURE — 99213 OFFICE O/P EST LOW 20 MIN: CPT | Performed by: INTERNAL MEDICINE

## 2023-12-08 NOTE — NURSING NOTE
Return Appointment: Patient given instructions regarding scheduling next clinic visit. Patient demonstrated understanding of this information and agreed to call with further questions or concerns. Patient to follow up as needed.     Patient stated he understood all health information given and agreed to call with further questions or concerns.    Ching Isbell RN, BSN  Cardiology RN Care Coordinator   [place]   Phone: [phone]  Fax: [phone] ([place]) [phone] ([place])

## 2023-12-08 NOTE — PATIENT INSTRUCTIONS
Thank you for coming to the [facility] at [place]; please note the following instructions:    1. No changes    2. Follow up as needed        If you have any questions regarding your visit, please contact your care team:     CARDIOLOGY  TELEPHONE NUMBER   Dee Dee KOENIGErica, Registered Nurse  Ching SNELL, Registered Nurse  Alyssa HERNANDEZ, Registered Nurse  Veda GOTTLIEB, Registered Medical Assistant  Ranjana GARCIA, Certified Medical Assistant  Marimar ARMENDARIZ, Visit Facilitator [phone] (select option 1)    *After hours: [phone]   For Scheduling Appts:     [phone] (select option 1)    *After hours: [phone]   For the Device Clinic (Pacemakers and ICD's)  Naty PETER, Registered Nurse   During business hours: [phone]    *After business hours:  [phone] (select option 4)      Normal test result notifications will be released via [employer] or mailed within 7 business days.  All other test results, will be communicated via telephone once reviewed by your cardiologist.    If you need a medication refill, please contact your pharmacy.  Please allow 3 business days for your refill to be completed.    As always, thank you for trusting us with your health care needs!

## 2023-12-08 NOTE — LETTER
12/8/2023      RE: Pranay Dubon  [address] [facility] [address]       Dear Colleague,    Thank you for the opportunity to participate in the care of your patient, Pranay Dubon, at the [facility]. Please see a copy of my visit note below.    HPI: Mr. Pranay Dubon is a 84 year old  male with PMH significant for chronic iron deficiency anemia, heart failure with preserved ejection fraction, hypertension, paroxysmal atrial fibrillation on aspirin only, AAA repair in 11/2018 and CAD S/P PCI in 1985×4 (no coronary angiogram since then), and small bowel obstruction requiring abdominal surgery.    Mr. Lindsey is a new patient to me.  All his prior cardiac care was at [facility].  Patient was last seen in cardiology clinic at [facility] a year ago.  He is establishing care with us today.  He has recently seen Dr. Lazaro and he is referred to cardiology due to his history of AAA surgery and paroxysmal atrial fibrillation.      Patient was recently admitted to [facility] on 6/11/2020 for epigastric and chest discomfort.  Patient's symptoms apparently started after working in his backyard.  He stayed at the hospital for 3 days.  He underwent thorough evaluation including CT of the chest which showed no evidence of dissection. CT of the abdomen pelvis was obtained which was negative for evidence of malignancy though was concerning for cholecystitis.  Patient was found to have significant leukocytosis on admission.  On hospital day 1 his white count yaakov to 32K.  He reported resolution of chest and back pain.  Serial troponins were negative.  Patient underwent HIDA scan which showed patent bile ducts. He was deemed to be dehydrated.  No medication change was recommended.      Since discharge the patient reports having balance issues.  When he tries to move fast he feels like he is losing balance.  Denies  chest pain, syncope, lower extremity edema.  He tells me that he cannot gain weight.  His appetite is good.  Patient also tells me that he has chronic diarrhea since small bowel surgery in January 2019.  He tells me that he is almost semi-continent.  He checks his pulse and finds his pulse around 50s.  Blood pressure 100/59 mmHg.  Patient again tells me that he has never felt atrial fibrillation.  He is aware of only one episode of A. fib when [facility] was in the [facility].  He denies recurrence since then.  Patient's EKG shows sinus rhythm during his most recent admission as well.  Reviewing patient's prior cardiology reports I have seen that he was noted to have recurrent epistaxis when he was on warfarin. Eventually he was taken off of warfarin due to GI bleeding of unclear source.  His hemoglobin was as low as 6 at some point.  He is maintaining stable hemoglobin now iron replacement.    [facility]    Patient has 30-pack-year history of smoking.  Quit date 1983.  Patient was admitted to [facility] December 2019 with small bowel obstruction complicated by septic shock from bacteremia.  He underwent exploratory laparotomy with lysis of adhesions.  His hospital course was complicated by SHANTEL requiring temporary CRRT.    Echocardiogram a year ago ([facility]) showed normal biventricular function with grade 1 LV diastolic dysfunction.  No significant valve disease was noted.     Medications, personal, family, and social history reviewed with patient and revised.    Interval history 8/17/2022:  Patient is being seen today for follow-up.  Last time I saw him 2 years ago.  He is overall feeling well from cardiac standpoint.  No chest pain, shortness of breath, palpitations, dizziness, syncope or lower extremity edema.  [facility] He [facility] and he tells me that [facility] are trying to  figure out his diarrhea and constipation problem.  His wife is accompanying him today.  They walk outside.  He can cut the grass.    Interval history 12/8/2023:  Patient is being seen today for annual visit.  He tells me that his back pain is really troubling him.  He walks with a walker.  As soon as he stands up back pain close to tailbone kicks in.  Reports numbness down the left leg.  No cardiac symptoms at all.  No chest pain, shortness of breath, dizziness, syncope or lower extremity edema.  [facility].  PAST MEDICAL HISTORY:  Past Medical History:   Diagnosis Date    Atrial fibrillation (H)     BPH (benign prostatic hyperplasia)     CAD (coronary artery disease)     4 stents    ED (erectile dysfunction)     GERD (gastroesophageal reflux disease)     Hypercholesterolemia     Hypertension goal BP (blood pressure) < 140/90     Lumbar stenosis     Microscopic hematuria     normal urologic evaluation     Nonsenile cataract     Obesity     SNHL (sensorineural hearing loss)     Type 2 diabetes mellitus without complications (H)        CURRENT MEDICATIONS:  Current Outpatient Medications   Medication Sig Dispense Refill    aspirin (ASA) 325 MG EC tablet Take 325 mg by mouth daily      atorvastatin (LIPITOR) 40 MG tablet Take 1 tablet (40 mg) by mouth daily 90 tablet 1    metoprolol tartrate (LOPRESSOR) 25 MG tablet Take 1 tablet (25 mg) by mouth daily 30 tablet 0    multivitamin w/minerals (THERA-VIT-M) tablet Take 2 tablets by mouth daily       Nitroglycerin (NITROSTAT SL) Place 0.4 mg under the tongue      omeprazole (PRILOSEC) 20 MG DR capsule Take 20 mg by mouth as needed      sildenafil (VIAGRA) 25 MG tablet Take 25 mg by mouth as needed. As needed         PAST SURGICAL HISTORY:  Past Surgical History:   Procedure Laterality Date    ANGIOPLASTY  1992    x 4    CATARACT IOL, RT/LT      [facility] about 2018-9    LASER YAG CAPSULOTOMY Right 03/22/2021    LASER YAG CAPSULOTOMY Left  2021    OPEN REDUCTION INTERNAL FIXATION ANKLE Right 1978    REPAIR ANEURYSM ABDOMINAL AORTA  2018    [facility] in the groin        ALLERGIES:   No Known Allergies    FAMILY HISTORY:  Family History   Problem Relation Age of Onset    Diabetes Mother     Heart Disease Mother         CHF    Cerebrovascular Disease Mother     Macular Degeneration Mother     Diabetes Sister     Cerebrovascular Disease Sister     Heart Disease Brother         CHF    Cerebrovascular Disease Brother     Heart Disease Sister         CHF    Cancer No family hx of     Hypertension No family hx of     Thyroid Disease No family hx of     Glaucoma No family hx of          SOCIAL HISTORY:  Social History     Tobacco Use    Smoking status: Former     Packs/day: 1.00     Years: 30.00     Additional pack years: 0.00     Total pack years: 30.00     Types: Cigarettes     Quit date: 1983     Years since quittin.3    Smokeless tobacco: Never   Substance Use Topics    Alcohol use: No     Alcohol/week: 0.0 standard drinks of alcohol    Drug use: No       ROS:   Constitutional: No fever, chills, or sweats. Weight stable.   Cardiovascular: As per HPI.       Exam:  /78   Pulse 56   Wt 55.8 kg (123 lb)   BMI 21.74 kg/m    GENERAL APPEARANCE: alert and no distress  HEENT: no icterus, no central cyanosis  LYMPH/NECK: no adenopathy, no asymmetry, JVP not elevated  RESPIRATORY: lungs clear to auscultation - no rales, rhonchi or wheezes, no use of accessory muscles, no retractions, respirations are unlabored, normal respiratory rate  CARDIOVASCULAR: regular rhythm, normal S1, S2, no S3 or S4 and no murmur, click or rub, precordium quiet with normal PMI.  EXTREMITIES: no edema  NEURO: alert, normal speech,and affect  SKIN: no ecchymoses, no rashes     I have reviewed the labs and personally reviewed the imaging below and made my comment in the assessment and plan.    Labs:  CBC RESULTS:   Lab Results    Component Value Date    WBC 12.7 (H) 11/11/2022    WBC 9.0 11/29/2018    RBC 4.23 (L) 11/11/2022    RBC 4.23 (L) 11/29/2018    HGB 11.9 (L) 11/11/2022    HGB 12.3 (L) 11/29/2018    HCT 37.8 (L) 11/11/2022    HCT 38.4 (L) 11/29/2018    MCV 89 11/11/2022    MCV 91 11/29/2018    MCH 28.1 11/11/2022    MCH 29.1 11/29/2018    MCHC 31.5 11/11/2022    MCHC 32.0 11/29/2018    RDW 15.7 (H) 11/11/2022    RDW 15.5 (H) 11/29/2018     11/11/2022     11/29/2018       BMP RESULTS:  Lab Results   Component Value Date     11/11/2022     11/29/2018    POTASSIUM 4.7 11/11/2022    POTASSIUM 4.2 11/29/2018    CHLORIDE 110 (H) 11/11/2022    CHLORIDE 104 11/29/2018    CO2 24 11/11/2022    CO2 29 11/29/2018    ANIONGAP 3 11/11/2022    ANIONGAP 5 11/29/2018     (H) 11/11/2022    GLC 93 11/29/2018    BUN 32 (H) 11/11/2022    BUN 19 11/29/2018    CR 1.55 (H) 11/11/2022    CR 1.03 11/29/2018    GFRESTIMATED 43 (L) 11/11/2022    GFRESTIMATED 69 11/29/2018    GFRESTBLACK 84 11/29/2018    YUNIOR 9.2 11/11/2022    YUNIOR 9.1 11/29/2018     TTE 8/15/2022  Technically difficult study.Extremely poor acoustic windows.  Global and regional left ventricular function is normal with an EF of 55-60%.  Right ventricular function, chamber size, wall motion, and thickness are  normal.  Pulmonary artery systolic pressure cannot be assessed.  Sinuses of Valsalva 3.9 cm.  Aortic root index 24.3mm/m2,mild dilation for BSA of 1.6m2.  The inferior vena cava is normal.  No pericardial effusion is present.  There is no prior study for direct comparison.    Chest CT 6/11/2020 [facility]:     1.  New irregular 22 x 8 mm subpleural right lower lobe pulmonary nodule with  adjacent smaller nodularity. This is suspicious for pulmonary malignancy  although the possibility of infection is not excluded. This is atypical for  COVID 19 pneumonia. See follow-up recommendation below.  2.  Stable groundglass nodule in the left lower  lobe.    Echocardiogram 1/13/2019:   1. Mildly enlarged left atrium.   2. Grade 1 pattern of LV diastolic filling.   3. Normal left ventricular size, borderline wall thickness, normal global systolic function, calculated EF of 68 %.   4. Right ventricular cavity size is normal, global systolic RV function is normal.   5. The aortic valve is normal and trileaflet, no stenosis and mild regurgitation.   6. The mitral valve is sclerotic, mild mitral regurgitation.   7. 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   8. No pericardial effusion    EKG 6/12/2020 [facility] sinus rhythm with PACs.    Assessment and Plan:   Mr. Pranay Dubon is a 88 year old  male with PMH significant for chronic iron deficiency anemia,  hypertension, AAA repair in 11/2018 and CAD S/P PCI in 1985×4 (no coronary angiogram since then), and small bowel obstruction requiring abdominal surgery.    Patient follows with me today with regards to his prior history of CAD.  He is overall asymptomatic from cardiac standpoint.  He is on aspirin and metoprolol.  He had echocardiogram a year ago which showed normal biventricular function with no significant valve disease.  Physical exam is unremarkable.  Vitals are normal.  I recommend to continue current medication and follow-up with me as needed. [facility] of triple AAA repair and GI symptoms.    Return to clinic as needed.    Total time spent 20 minutes including face-to-face clinic visit, review of labs/tests and medical documentation.    Ricardo GROSSMAN MD  [facility] Division of Cardiology  Pager [phone]

## 2023-12-09 DIAGNOSIS — I10 BENIGN ESSENTIAL HYPERTENSION: Primary | ICD-10-CM

## 2023-12-12 RX ORDER — METOPROLOL TARTRATE 25 MG/1
25 TABLET, FILM COATED ORAL DAILY
Qty: 90 TABLET | Refills: 3 | Status: SHIPPED | OUTPATIENT
Start: 2023-12-12

## 2024-06-23 ENCOUNTER — HEALTH MAINTENANCE LETTER (OUTPATIENT)
Age: 89
End: 2024-06-23

## 2025-02-11 ENCOUNTER — TELEPHONE (OUTPATIENT)
Dept: FAMILY MEDICINE | Facility: CLINIC | Age: 89
End: 2025-02-11
Payer: COMMERCIAL

## 2025-02-11 NOTE — TELEPHONE ENCOUNTER
Patient Quality Outreach    Patient is due for the following:   Physical Annual Wellness Visit    Action(s) Taken:   Schedule a Annual Wellness Visit    Type of outreach:    Sent [employer] message.    Questions for provider review:    None           Aracelis Nicole MA

## 2025-07-12 ENCOUNTER — HEALTH MAINTENANCE LETTER (OUTPATIENT)
Age: 89
End: 2025-07-12

## 2025-08-18 ENCOUNTER — ALLIED HEALTH/NURSE VISIT (OUTPATIENT)
Dept: FAMILY MEDICINE | Facility: CLINIC | Age: 89
End: 2025-08-18
Payer: COMMERCIAL

## 2025-08-18 DIAGNOSIS — Z23 ENCOUNTER FOR IMMUNIZATION: Primary | ICD-10-CM

## 2025-08-18 PROCEDURE — 91320 SARSCV2 VAC 30MCG TRS-SUC IM: CPT

## 2025-08-18 PROCEDURE — 90480 ADMN SARSCOV2 VAC 1/ONLY CMP: CPT

## 2025-08-18 PROCEDURE — 99207 PR NO CHARGE NURSE ONLY: CPT
